# Patient Record
Sex: MALE | Race: WHITE | Employment: OTHER | URBAN - METROPOLITAN AREA
[De-identification: names, ages, dates, MRNs, and addresses within clinical notes are randomized per-mention and may not be internally consistent; named-entity substitution may affect disease eponyms.]

---

## 2017-05-19 DIAGNOSIS — G47.00 PERSISTENT INSOMNIA: ICD-10-CM

## 2017-05-19 RX ORDER — ZOLPIDEM TARTRATE 5 MG/1
TABLET ORAL
Start: 2017-05-19

## 2017-05-25 ENCOUNTER — OFFICE VISIT (OUTPATIENT)
Dept: FAMILY MEDICINE | Facility: CLINIC | Age: 82
End: 2017-05-25
Payer: MEDICARE

## 2017-05-25 VITALS
TEMPERATURE: 97.5 F | SYSTOLIC BLOOD PRESSURE: 133 MMHG | HEIGHT: 70 IN | BODY MASS INDEX: 21.9 KG/M2 | OXYGEN SATURATION: 95 % | DIASTOLIC BLOOD PRESSURE: 82 MMHG | WEIGHT: 153 LBS | HEART RATE: 68 BPM

## 2017-05-25 DIAGNOSIS — Z00.01 ENCOUNTER FOR ROUTINE ADULT HEALTH EXAMINATION WITH ABNORMAL FINDINGS: Primary | ICD-10-CM

## 2017-05-25 DIAGNOSIS — D50.8 OTHER IRON DEFICIENCY ANEMIA: ICD-10-CM

## 2017-05-25 DIAGNOSIS — G47.00 PERSISTENT INSOMNIA: ICD-10-CM

## 2017-05-25 DIAGNOSIS — I10 ESSENTIAL HYPERTENSION WITH GOAL BLOOD PRESSURE LESS THAN 140/90: ICD-10-CM

## 2017-05-25 LAB — HGB BLD-MCNC: 13.1 G/DL (ref 13.3–17.7)

## 2017-05-25 PROCEDURE — 36415 COLL VENOUS BLD VENIPUNCTURE: CPT | Performed by: FAMILY MEDICINE

## 2017-05-25 PROCEDURE — G0439 PPPS, SUBSEQ VISIT: HCPCS | Performed by: FAMILY MEDICINE

## 2017-05-25 PROCEDURE — 85018 HEMOGLOBIN: CPT | Performed by: FAMILY MEDICINE

## 2017-05-25 PROCEDURE — 80053 COMPREHEN METABOLIC PANEL: CPT | Performed by: FAMILY MEDICINE

## 2017-05-25 RX ORDER — ZOLPIDEM TARTRATE 5 MG/1
5 TABLET ORAL
Qty: 90 TABLET | Refills: 1 | Status: SHIPPED | OUTPATIENT
Start: 2017-05-25 | End: 2018-03-23

## 2017-05-25 RX ORDER — LISINOPRIL AND HYDROCHLOROTHIAZIDE 20; 25 MG/1; MG/1
TABLET ORAL
Qty: 90 TABLET | Status: SHIPPED | OUTPATIENT
Start: 2017-05-25 | End: 2018-05-29

## 2017-05-25 NOTE — PROGRESS NOTES
SUBJECTIVE:                                                            Bhavesh Schafer is a 86 year old male who presents for Preventive Visit.      Are you in the first 12 months of your Medicare Part B coverage?  No    Healthy Habits:    Do you get at least three servings of calcium containing foods daily (dairy, green leafy vegetables, etc.)? yes    Amount of exercise or daily activities, outside of work:3 day(s) per week    Problems taking medications regularly No    Medication side effects: No    Have you had an eye exam in the past two years? no    Do you see a dentist twice per year? no    Do you have sleep apnea, excessive snoring or daytime drowsiness?no    COGNITIVE SCREEN  1) Repeat 3 items (Banana, Sunrise, Chair)    2) Clock draw: NORMAL  3) 3 item recall: Recalls 3 objects  Results: 3 items recalled: COGNITIVE IMPAIRMENT LESS LIKELY    Mini-CogTM Copyright S [employer]. Licensed by the author for use in [facility]; reprinted with permission (ayush@[facility]). All rights reserved.            Reviewed and updated as needed this visit by clinical staff  Meds  Problems         Reviewed and updated as needed this visit by Provider  Meds  Problems        Social History   Substance Use Topics     Smoking status: Former Smoker     Quit date: 3/5/1979     Smokeless tobacco: Never Used     Alcohol use No      Comment: rare red wine       The patient does not drink >3 drinks per day nor >7 drinks per week.    Today's PHQ-2 Score:   PHQ-2 ( 1999 [employer]) 5/25/2017 1/15/2014   Q1: Little interest or pleasure in doing things 0 0   Q2: Feeling down, depressed or hopeless 0 0   PHQ-2 Score 0 0       Do you feel safe in your environment - Yes    Do you have a Health Care Directive?: No: Advance care planning was reviewed with patient; patient declined at this time.    Current providers sharing in care for this patient include:   Patient Care Team:  Tariq Zapata MD as PCP - General      [facility]  impairment: No, m,ay not hear as sharp as before bu tok    Ability to successfully perform activities of daily living: Yes, no assistance needed     Fall risk:  Fallen 2 or more times in the past year?: No  Any fall with injury in the past year?: No    Home safety:  none identified      The following health maintenance items are reviewed in Epic and correct as of today:  Health Maintenance   Topic Date Due     MEDICARE ANNUAL WELLNESS VISIT  04/18/1949     DEPRESSION ACTION PLAN Q1 YR  02/20/2015     PHQ-9 Q6 MONTHS  11/24/2016     FALL RISK ASSESSMENT  05/24/2017     INFLUENZA VACCINE (SYSTEM ASSIGNED)  09/01/2017     ADVANCE DIRECTIVE PLANNING Q5 YRS  02/05/2018     TETANUS IMMUNIZATION (SYSTEM ASSIGNED)  12/17/2019     PNEUMOCOCCAL  Completed              ROS:he is doing well, continues to paint, cooking his own food and living independently. He has a large apartment where he can live and paint.We talked about the Ambien again. We have tried multiple times to find an alternative  Way for him to sleep well but if he doesn't have it even for long periods of time he never gets used to sleeping on his own, is really miserable during the day,, and he has safely used Ambien for years now. I told him I'm fine with filling another 6 months worth. In 6 months if the doctor taking over her knee is uncomfortable with that they ask him to schedule an appointment to discuss it. We also talked about trying valerian root and or melatonin again. We checked his hemoglobin again today, just slightly low. It was never clear why he became so iron deficient some years ago at 13.1 I think is fine.  C: NEGATIVE for fever, chills, change in weight  I: NEGATIVE for worrisome rashes, moles or lesions  E: NEGATIVE for vision changes or irritation  E/M: NEGATIVE for ear, mouth and throat problems  R: NEGATIVE for significant cough or SOB  B: NEGATIVE for masses, tenderness or discharge  CV: NEGATIVE for chest pain, palpitations or  "peripheral edema  GI: NEGATIVE for nausea, abdominal pain, heartburn, or change in bowel habits  : NEGATIVE for frequency, dysuria, or hematuria  M: NEGATIVE for significant arthralgias or myalgia  N: NEGATIVE for weakness, dizziness or paresthesias  E: NEGATIVE for temperature intolerance, skin/hair changes  H: NEGATIVE for bleeding problems  P: NEGATIVE for changes in mood or affect    Problem list, Medication list, Allergies, and Medical/Social/Surgical histories reviewed in [facility] and updated as appropriate.  OBJECTIVE:                                                            There were no vitals taken for this visit. Estimated body mass index is 22.53 kg/(m^2) as calculated from the following:    Height as of 5/24/16: 5' 10\" (1.778 m).    Weight as of 5/24/16: 157 lb (71.2 kg).  EXAM:   GENERAL: healthy, alert and no distress  EYES: Eyes grossly normal to inspection, PERRL and conjunctivae and sclerae normal  HENT: ear canals and TM's normal, nose and mouth without ulcers or lesions  NECK: no adenopathy, no asymmetry, masses, or scars and thyroid normal to palpation  RESP: lungs clear to auscultation - no rales, rhonchi or wheezes  CV: regular rate and rhythm, normal S1 S2, no S3 or S4, no murmur, click or rub, no peripheral edema and peripheral pulses strong  ABDOMEN: soft, nontender, no hepatosplenomegaly, no masses and bowel sounds normal   (male): normal male genitalia without lesions or urethral discharge, no hernia  RECTAL: normal sphincter tone, no rectal masses, prostate normal size, smooth, nontender without nodules or masses  MS: no gross musculoskeletal defects noted, no edema  SKIN: no suspicious lesions or rashes  NEURO: Normal strength and tone, mentation intact and speech normal  PSYCH: mentation appears normal, affect normal/bright    ASSESSMENT / PLAN:                                                            1. Encounter for routine adult health examination with abnormal findings    - " "Comprehensive metabolic panel (BMP + Alb, Alk Phos, ALT, AST, Total. Bili, TP)    2. Persistent insomnia    - zolpidem (AMBIEN) 5 MG tablet; Take 1 tablet (5 mg) by mouth nightly as needed for sleep  Dispense: 90 tablet; Refill: 1    3. Essential hypertension with goal blood pressure less than 140/90    - lisinopril-hydrochlorothiazide (PRINZIDE/ZESTORETIC) 20-25 MG per tablet; TAKE 1 TABLET BY ORAL ROUTE ONCE DAILY  Dispense: 90 tablet; Refill: prn    4. Other iron deficiency anemia    - Hemoglobin    End of Life Planning:  Patient currently has an advanced directive: No.  I have verified the patient's ablity to prepare an advanced directive/make health care decisions.  Literature was provided to assist patient in preparing an advanced directive.    COUNSELING:  Reviewed preventive health counseling, as reflected in patient instructions       Regular exercise       Healthy diet/nutrition       Vision screening        Estimated body mass index is 22.53 kg/(m^2) as calculated from the following:    Height as of 5/24/16: 5' 10\" (1.778 m).    Weight as of 5/24/16: 157 lb (71.2 kg).     reports that he quit smoking about 38 years ago. He has never used smokeless tobacco.      Appropriate preventive services were discussed with this patient, including applicable screening as appropriate for cardiovascular disease, diabetes, osteopenia/osteoporosis, and glaucoma.  As appropriate for age/gender, discussed screening for colorectal cancer, prostate cancer, breast cancer, and cervical cancer. Checklist reviewing preventive services available has been given to the patient.    Reviewed patients plan of care and provided an AVS. The Basic Care Plan (routine screening as documented in Health Maintenance) for Bhavesh meets the Care Plan requirement. This Care Plan has been established and reviewed with the Patient.    Counseling Resources:  ATP IV Guidelines  Pooled Cohorts Equation Calculator  Breast Cancer Risk Calculator  FRAX " Risk Assessment  ICSI Preventive Guidelines  Dietary Guidelines for Americans, 2010  USDA's MyPlate  ASA Prophylaxis  Lung CA Screening    Tariq Zapata MD  [facility]

## 2017-05-25 NOTE — MR AVS SNAPSHOT
After Visit Summary   5/25/2017    Bhavesh Schafer    MRN: [id number]           Patient Information     Date Of Birth          4/18/1931        Visit Information        Provider Department      5/25/2017 11:30 AM Tariq Zapata MD [facility]        Today's Diagnoses     Encounter for routine adult health examination with abnormal findings    -  1    Persistent insomnia        Essential hypertension with goal blood pressure less than 140/90        Other iron deficiency anemia          Care Instructions      Preventive Health Recommendations:       Male Ages 65 and over    Yearly exam:             See your health care provider every year in order to  o   Review health changes.   o   Discuss preventive care.    o   Review your medicines if your doctor has prescribed any.    Talk with your health care provider about whether you should have a test to screen for prostate cancer (PSA).    Every 3 years, have a diabetes test (fasting glucose). If you are at risk for diabetes, you should have this test more often.    Every 5 years, have a cholesterol test. Have this test more often if you are at risk for high cholesterol or heart disease.     Every 10 years, have a colonoscopy. Or, have a yearly FIT test (stool test). These exams will check for colon cancer.    Talk to with your health care provider about screening for Abdominal Aortic Aneurysm if you have a family history of AAA or have a history of smoking.  Shots:     Get a flu shot each year.     Get a tetanus shot every 10 years.     Talk to your doctor about your pneumonia vaccines. There are now two you should receive - Pneumovax (PPSV 23) and Prevnar (PCV 13).    Talk to your doctor about a shingles vaccine.     Talk to your doctor about the hepatitis B vaccine.  Nutrition:     Eat at least 5 servings of fruits and vegetables each day.     Eat whole-grain bread, whole-wheat pasta and brown rice instead of white grains and rice.     Talk  "to your doctor about Calcium and Vitamin D.   Lifestyle    Exercise for at least 150 minutes a week (30 minutes a day, 5 days a week). This will help you control your weight and prevent disease.     Limit alcohol to one drink per day.     No smoking.     Wear sunscreen to prevent skin cancer.     See your dentist every six months for an exam and cleaning.     See your eye doctor every 1 to 2 years to screen for conditions such as glaucoma, macular degeneration and cataracts.          Follow-ups after your visit        Who to contact     If you have questions or need follow up information about today's clinic visit or your schedule please contact [facility] directly at [phone].  Normal or non-critical lab and imaging results will be communicated to you by MyChart, letter or phone within 4 business days after the clinic has received the results. If you do not hear from us within 7 days, please contact the clinic through "[employer]hart or phone. If you have a critical or abnormal lab result, we will notify you by phone as soon as possible.  Submit refill requests through [employer] or call your pharmacy and they will forward the refill request to us. Please allow 3 business days for your refill to be completed.          Additional Information About Your Visit        MyChart Information     [employer] lets you send messages to your doctor, view your test results, renew your prescriptions, schedule appointments and more. To sign up, go to [email or web address] . Click on \"Log in\" on the left side of the screen, which will take you to the Welcome page. Then click on \"Sign up Now\" on the right side of the page.     You will be asked to enter the access code listed below, as well as some personal information. Please follow the directions to create your username and password.     Your access code is: [email or web address]  Expires: 2017 12:04 PM     Your access code will  in 90 days. If you need help or a new code, " "please call your [place] clinic or [phone].        Care EveryWhere ID     This is your Care EveryWhere ID. This could be used by other organizations to access your [place] medical records  [id number]        Your Vitals Were     Pulse Temperature Height Pulse Oximetry BMI (Body Mass Index)       68 97.5  F (36.4  C) (Oral) 5' 10\" (1.778 m) 95% 21.95 kg/m2        Blood Pressure from Last 3 Encounters:   05/25/17 133/82   05/24/16 120/80   04/07/15 100/70    Weight from Last 3 Encounters:   05/25/17 153 lb (69.4 kg)   05/24/16 157 lb (71.2 kg)   04/07/15 159 lb (72.1 kg)              We Performed the Following     Comprehensive metabolic panel (BMP + Alb, Alk Phos, ALT, AST, Total. Bili, TP)     Hemoglobin          Where to get your medicines      These medications were sent to [id number] [address] [address]     Phone:  [phone]     lisinopril-hydrochlorothiazide 20-25 MG per tablet         Some of these will need a paper prescription and others can be bought over the counter.  Ask your nurse if you have questions.     Bring a paper prescription for each of these medications     zolpidem 5 MG tablet          Primary Care Provider Office Phone # Fax #    Tariq Zapata [phone]       [facility] [address] [address]        Thank you!     Thank you for choosing [facility]  for your care. Our goal is always to provide you with excellent care. Hearing back from our patients is one way we can continue to improve our services. Please take a few minutes to complete the written survey that you may receive in the mail after your visit with us. Thank you!             Your Updated Medication List - Protect others around you: Learn how to safely use, store and throw away your medicines at www.disposemymeds.org.          This list is accurate as of: 5/25/17 12:04 PM.  Always use " your most recent med list.                   Brand Name Dispense Instructions for use    [facility] SAW PALMETTO 160 MG Caps   Generic drug:  saw palmetto      Take 3 capsules (480 mg) by mouth daily       lisinopril-hydrochlorothiazide 20-25 MG per tablet    PRINZIDE/ZESTORETIC    90 tablet    TAKE 1 TABLET BY ORAL ROUTE ONCE DAILY       magnesium 250 MG tablet     100 tablet    Take 1 tablet by mouth daily.       VITAMIN C 2000 MG Tabs     30    1 TABLET DAILY       zolpidem 5 MG tablet    AMBIEN    90 tablet    Take 1 tablet (5 mg) by mouth nightly as needed for sleep

## 2017-05-25 NOTE — NURSING NOTE
"Chief Complaint   Patient presents with     Medicare Visit     not fasting     /82  Pulse 68  Temp 97.5  F (36.4  C) (Oral)  Ht 5' 10\" (1.778 m)  Wt 153 lb (69.4 kg)  SpO2 95%  BMI 21.95 kg/m2 Estimated body mass index is 21.95 kg/(m^2) as calculated from the following:    Height as of this encounter: 5' 10\" (1.778 m).    Weight as of this encounter: 153 lb (69.4 kg).  BP completed using cuff size: regular       Health Maintenance due pending provider review:  NONE    n/a      Pako Coy CMA  "

## 2017-05-26 LAB
ALBUMIN SERPL-MCNC: 4 G/DL (ref 3.4–5)
ALP SERPL-CCNC: 70 U/L (ref 40–150)
ALT SERPL W P-5'-P-CCNC: 16 U/L (ref 0–70)
ANION GAP SERPL CALCULATED.3IONS-SCNC: 6 MMOL/L (ref 3–14)
AST SERPL W P-5'-P-CCNC: 17 U/L (ref 0–45)
BILIRUB SERPL-MCNC: 0.7 MG/DL (ref 0.2–1.3)
BUN SERPL-MCNC: 29 MG/DL (ref 7–30)
CALCIUM SERPL-MCNC: 9.1 MG/DL (ref 8.5–10.1)
CHLORIDE SERPL-SCNC: 102 MMOL/L (ref 94–109)
CO2 SERPL-SCNC: 28 MMOL/L (ref 20–32)
CREAT SERPL-MCNC: 0.96 MG/DL (ref 0.66–1.25)
GFR SERPL CREATININE-BSD FRML MDRD: 74 ML/MIN/1.7M2
GLUCOSE SERPL-MCNC: 94 MG/DL (ref 70–99)
POTASSIUM SERPL-SCNC: 4.1 MMOL/L (ref 3.4–5.3)
PROT SERPL-MCNC: 7.7 G/DL (ref 6.8–8.8)
SODIUM SERPL-SCNC: 136 MMOL/L (ref 133–144)

## 2017-05-26 NOTE — PROGRESS NOTES
Disc / pt. Stopped iron long ago as it was constipating. I rec he but a bottle of iron pills and take one a week indefinitely

## 2018-01-29 ENCOUNTER — OFFICE VISIT (OUTPATIENT)
Dept: FAMILY MEDICINE | Facility: CLINIC | Age: 83
End: 2018-01-29
Payer: MEDICARE

## 2018-01-29 VITALS
SYSTOLIC BLOOD PRESSURE: 122 MMHG | WEIGHT: 150 LBS | HEART RATE: 78 BPM | DIASTOLIC BLOOD PRESSURE: 76 MMHG | HEIGHT: 70 IN | OXYGEN SATURATION: 98 % | BODY MASS INDEX: 21.47 KG/M2 | TEMPERATURE: 97.3 F

## 2018-01-29 DIAGNOSIS — I10 HYPERTENSION GOAL BP (BLOOD PRESSURE) < 140/90: ICD-10-CM

## 2018-01-29 DIAGNOSIS — R53.83 OTHER FATIGUE: Primary | ICD-10-CM

## 2018-01-29 DIAGNOSIS — G47.00 PERSISTENT INSOMNIA: ICD-10-CM

## 2018-01-29 DIAGNOSIS — S42.295A OTHER CLOSED NONDISPLACED FRACTURE OF PROXIMAL END OF LEFT HUMERUS, INITIAL ENCOUNTER: ICD-10-CM

## 2018-01-29 LAB
ALBUMIN SERPL-MCNC: 3.8 G/DL (ref 3.4–5)
ALBUMIN UR-MCNC: NEGATIVE MG/DL
ALP SERPL-CCNC: 110 U/L (ref 40–150)
ALT SERPL W P-5'-P-CCNC: 13 U/L (ref 0–70)
ANION GAP SERPL CALCULATED.3IONS-SCNC: 8 MMOL/L (ref 3–14)
APPEARANCE UR: CLEAR
AST SERPL W P-5'-P-CCNC: 18 U/L (ref 0–45)
BASOPHILS # BLD AUTO: 0 10E9/L (ref 0–0.2)
BASOPHILS NFR BLD AUTO: 0.3 %
BILIRUB SERPL-MCNC: 0.5 MG/DL (ref 0.2–1.3)
BILIRUB UR QL STRIP: NEGATIVE
BUN SERPL-MCNC: 30 MG/DL (ref 7–30)
CALCIUM SERPL-MCNC: 9.3 MG/DL (ref 8.5–10.1)
CHLORIDE SERPL-SCNC: 98 MMOL/L (ref 94–109)
CO2 SERPL-SCNC: 28 MMOL/L (ref 20–32)
COLOR UR AUTO: YELLOW
CREAT SERPL-MCNC: 1.12 MG/DL (ref 0.66–1.25)
DIFFERENTIAL METHOD BLD: ABNORMAL
EOSINOPHIL # BLD AUTO: 0.1 10E9/L (ref 0–0.7)
EOSINOPHIL NFR BLD AUTO: 1.1 %
ERYTHROCYTE [DISTWIDTH] IN BLOOD BY AUTOMATED COUNT: 13.4 % (ref 10–15)
GFR SERPL CREATININE-BSD FRML MDRD: 62 ML/MIN/1.7M2
GLUCOSE SERPL-MCNC: 90 MG/DL (ref 70–99)
GLUCOSE UR STRIP-MCNC: NEGATIVE MG/DL
HCT VFR BLD AUTO: 39.9 % (ref 40–53)
HGB BLD-MCNC: 13.1 G/DL (ref 13.3–17.7)
HGB UR QL STRIP: NEGATIVE
KETONES UR STRIP-MCNC: NEGATIVE MG/DL
LEUKOCYTE ESTERASE UR QL STRIP: NEGATIVE
LYMPHOCYTES # BLD AUTO: 1.2 10E9/L (ref 0.8–5.3)
LYMPHOCYTES NFR BLD AUTO: 19.1 %
MCH RBC QN AUTO: 30.7 PG (ref 26.5–33)
MCHC RBC AUTO-ENTMCNC: 32.8 G/DL (ref 31.5–36.5)
MCV RBC AUTO: 93 FL (ref 78–100)
MONOCYTES # BLD AUTO: 0.8 10E9/L (ref 0–1.3)
MONOCYTES NFR BLD AUTO: 12.9 %
MUCOUS THREADS #/AREA URNS LPF: PRESENT /LPF
NEUTROPHILS # BLD AUTO: 4.1 10E9/L (ref 1.6–8.3)
NEUTROPHILS NFR BLD AUTO: 66.6 %
NITRATE UR QL: NEGATIVE
PH UR STRIP: 5.5 PH (ref 5–7)
PLATELET # BLD AUTO: 261 10E9/L (ref 150–450)
POTASSIUM SERPL-SCNC: 4.3 MMOL/L (ref 3.4–5.3)
PROT SERPL-MCNC: 7.4 G/DL (ref 6.8–8.8)
RBC # BLD AUTO: 4.27 10E12/L (ref 4.4–5.9)
RBC #/AREA URNS AUTO: ABNORMAL /HPF
SODIUM SERPL-SCNC: 134 MMOL/L (ref 133–144)
SOURCE: ABNORMAL
SP GR UR STRIP: 1.02 (ref 1–1.03)
UROBILINOGEN UR STRIP-ACNC: 0.2 EU/DL (ref 0.2–1)
WBC # BLD AUTO: 6.1 10E9/L (ref 4–11)
WBC #/AREA URNS AUTO: ABNORMAL /HPF

## 2018-01-29 PROCEDURE — 81001 URINALYSIS AUTO W/SCOPE: CPT | Performed by: FAMILY MEDICINE

## 2018-01-29 PROCEDURE — 99214 OFFICE O/P EST MOD 30 MIN: CPT | Performed by: FAMILY MEDICINE

## 2018-01-29 PROCEDURE — 85025 COMPLETE CBC W/AUTO DIFF WBC: CPT | Performed by: FAMILY MEDICINE

## 2018-01-29 PROCEDURE — 80053 COMPREHEN METABOLIC PANEL: CPT | Performed by: FAMILY MEDICINE

## 2018-01-29 PROCEDURE — 36415 COLL VENOUS BLD VENIPUNCTURE: CPT | Performed by: FAMILY MEDICINE

## 2018-01-29 NOTE — NURSING NOTE
"Chief Complaint   Patient presents with     Fatigue     Hypertension       Initial /76  Pulse 78  Temp 97.3  F (36.3  C) (Oral)  Ht 5' 10\" (1.778 m)  Wt 150 lb (68 kg)  SpO2 98%  BMI 21.52 kg/m2 Estimated body mass index is 21.52 kg/(m^2) as calculated from the following:    Height as of this encounter: 5' 10\" (1.778 m).    Weight as of this encounter: 150 lb (68 kg).  Medication Reconciliation: complete      Health Maintenance that is potentially due pending provider review:  PHQ9    Completing PHQ9 today.    NORM Gooden  "

## 2018-01-29 NOTE — MR AVS SNAPSHOT
"              After Visit Summary   2018    Bhavesh Schafer    MRN: [id number]           Patient Information     Date Of Birth          1931        Visit Information        Provider Department      2018 11:00 AM Eugene Maki MD [facility]        Today's Diagnoses     Other fatigue    -  1    Hypertension goal BP (blood pressure) < 140/90        Other closed nondisplaced fracture of proximal end of left humerus, initial encounter        Persistent insomnia           Follow-ups after your visit        Who to contact     If you have questions or need follow up information about today's clinic visit or your schedule please contact [facility] directly at [phone].  Normal or non-critical lab and imaging results will be communicated to you by MyChart, letter or phone within 4 business days after the clinic has received the results. If you do not hear from us within 7 days, please contact the clinic through MyChart or phone. If you have a critical or abnormal lab result, we will notify you by phone as soon as possible.  Submit refill requests through [employer] or call your pharmacy and they will forward the refill request to us. Please allow 3 business days for your refill to be completed.          Additional Information About Your Visit        MyChart Information     [employer] lets you send messages to your doctor, view your test results, renew your prescriptions, schedule appointments and more. To sign up, go to [email or web address] . Click on \"Log in\" on the left side of the screen, which will take you to the Welcome page. Then click on \"Sign up Now\" on the right side of the page.     You will be asked to enter the access code listed below, as well as some personal information. Please follow the directions to create your username and password.     Your access code is: [email or web address]  Expires: 2018 11:17 AM     Your access code will  in 90 days. If you need " Visit Information Date & Time Provider Department Dept. Phone Encounter #  
 12/4/2017  2:00 PM Christine Kelley MD [facility] 34 [id number] Follow-up Instructions Return in about 6 weeks (around 1/15/2018) for blood pressure, palpitations, anxiety. Upcoming Health Maintenance Date Due DTaP/Tdap/Td series (1 - Tdap) 4/18/1983 FOBT Q 1 YEAR AGE 50-75 10/2/2017 PAP AKA CERVICAL CYTOLOGY 7/15/2018 BREAST CANCER SCRN MAMMOGRAM 8/16/2019 Allergies as of 12/4/2017  Review Complete On: 12/4/2017 By: Christine Kelley MD  
  
 Severity Noted Reaction Type Reactions Percocet [Oxycodone-acetaminophen]  04/08/2014    Other (comments) Slows heart rate. Current Immunizations  Reviewed on 10/27/2016 No immunizations on file. Not reviewed this visit You Were Diagnosed With   
  
 Codes Comments Encounter for immunization    -  Primary ICD-10-CM: C32 ICD-9-CM: V03.89 Encounter for FIT (fecal immunochemical test) screening     ICD-10-CM: Z12.11 ICD-9-CM: V76.51 Chest pain at rest     ICD-10-CM: R07.9 ICD-9-CM: 786.50 Abnormal EKG     ICD-10-CM: R94.31 
ICD-9-CM: 794.31 Palpitations     ICD-10-CM: R00.2 ICD-9-CM: 785.1 Anxiety     ICD-10-CM: F41.9 ICD-9-CM: 300.00 Vitals BP Pulse Temp Resp Height(growth percentile) Weight(growth percentile) 137/87 78 97.8 °F (36.6 °C) (Oral) 18 5' 5\" (1.651 m) 205 lb (93 kg) BMI OB Status Smoking Status 34.11 kg/m2 Hysterectomy Never Smoker Vitals History BMI and BSA Data Body Mass Index Body Surface Area  
 34.11 kg/m 2 2.07 m 2 Preferred Pharmacy Pharmacy Name Phone [id number] [address] [phone] Your Updated Medication List  
  
   
This list is accurate as of: 12/4/17  3:01 PM.  Always use your most recent med list.  
  
  
  
  
 cetirizine 10 mg tablet "help or a new code, please call your [place] clinic or [phone].        Care EveryWhere ID     This is your Care EveryWhere ID. This could be used by other organizations to access your [place] medical records  [id number]        Your Vitals Were     Pulse Temperature Height Pulse Oximetry BMI (Body Mass Index)       78 97.3  F (36.3  C) (Oral) 5' 10\" (1.778 m) 98% 21.52 kg/m2        Blood Pressure from Last 3 Encounters:   01/29/18 122/76   05/25/17 133/82   05/24/16 120/80    Weight from Last 3 Encounters:   01/29/18 150 lb (68 kg)   05/25/17 153 lb (69.4 kg)   05/24/16 157 lb (71.2 kg)              We Performed the Following     CBC with platelets differential     Comprehensive metabolic panel     UA with Microscopic reflex to Culture        Primary Care Provider Office Phone # Fax #    Eugene Ron Maki [phone] [address] [facility] [address]        Equal Access to Services     [facility]: Hadii aad ku hadasho Soomaali, waaxda luqadaha, qaybta kaalmada adeegyadusty, lynda potts . So [facility] [phone].    ATENCIÓN: Si habla español, tiene a torres disposición servicios gratuitos de asistencia lingüística. Llame al [phone].    We comply with applicable federal civil rights laws and [place] laws. We do not discriminate on the basis of race, color, national origin, age, disability, sex, sexual orientation, or gender identity.            Thank you!     Thank you for choosing [facility]  for your care. Our goal is always to provide you with excellent care. Hearing back from our patients is one way we can continue to improve our services. Please take a few minutes to complete the written survey that you may receive in the mail after your visit with us. Thank you!             Your Updated Medication List - Protect others around you: Learn how to safely use, store and throw away your medicines at www.disposemymeds.org.        " Commonly known as:  ZYRTEC Take 1 Tab by mouth daily. For allergy Cholecalciferol (Vitamin D3) 2,000 unit Cap capsule Commonly known as:  VITAMIN D3 Take  by mouth daily. Diphth, Pertus(Acell), Tetanus 2.5-8-5 Lf-mcg-Lf/0.5mL Susp susp Commonly known as:  BOOSTRIX TDAP  
0.5 mL by IntraMUSCular route once for 1 dose. folic acid 1 mg tablet Commonly known as:  FOLVITE  
TAKE 1 TABLET BY MOUTH ONCE DAILY  
  
 methotrexate 2.5 mg tablet Commonly known as:  Forestine Candy * mometasone 50 mcg/actuation nasal spray Commonly known as:  NASONEX  
1 [place] by Both Nostrils route daily. * mometasone 50 mcg/actuation nasal spray Commonly known as:  NASONEX  
2 Sprays by Both Nostrils route daily. For allergy symptoms PATADAY 0.2 % Drop ophthalmic solution Generic drug:  olopatadine Apply 1 Drop to eye daily. predniSONE 5 mg tablet Commonly known as:  Barb Marie Take 5 mg by mouth daily. raNITIdine 300 mg tablet Commonly known as:  ZANTAC Take 1 Tab by mouth daily. VITAMIN C 500 mg tablet Generic drug:  ascorbic acid (vitamin C) Take  by mouth daily. * Notice: This list has 2 medication(s) that are the same as other medications prescribed for you. Read the directions carefully, and ask your doctor or other care provider to review them with you. Prescriptions Sent to Pharmacy Refills [place],, Tetanus (BOOSTRIX TDAP) 2.5-8-5 Lf-mcg-Lf/0.5mL susp susp 0 Si.5 mL by IntraMUSCular route once for 1 dose. Class: Normal  
 Pharmacy: [facility]/pharmacy #[id number] [place] [address], [address] [place] [address] Ph #: [phone] Route: IntraMUSCular We Performed the Following AMB POC EKG ROUTINE W/ 12 LEADS, INTER & REP [64923 CPT(R)] OCCULT BLOOD, IMMUNOASSAY (FIT) B2721986 CPT(R)] REFERRAL TO CARDIOLOGY [KJB92 Custom] Comments:  
 Chest pain, LVH on EKG, palpitations   This list is accurate as of 1/29/18 11:17 AM.  Always use your most recent med list.                   Brand Name Dispense Instructions for use Diagnosis    [facility] SAW PALMETTO 160 MG Caps   Generic drug:  saw palmetto      Take 3 capsules (480 mg) by mouth daily        lisinopril-hydrochlorothiazide 20-25 MG per tablet    PRINZIDE/ZESTORETIC    90 tablet    TAKE 1 TABLET BY ORAL ROUTE ONCE DAILY    Essential hypertension with goal blood pressure less than 140/90       magnesium 250 MG tablet     100 tablet    Take 1 tablet by mouth daily.        VITAMIN C 2000 MG Tabs     30    1 TABLET DAILY    Routine general medical examination at a health care facility       zolpidem 5 MG tablet    AMBIEN    90 tablet    Take 1 tablet (5 mg) by mouth nightly as needed for sleep    Persistent insomnia          TSH 3RD GENERATION [18298 CPT(R)] Follow-up Instructions Return in about 6 weeks (around 1/15/2018) for blood pressure, palpitations, anxiety. Referral Information Referral ID Referred By Referred To  
  
 [id number] Juan Francisco Yan MD   
   [address] [place] [address], [address] [place] [address] Phone: [phone] Fax: [phone] Visits Status Start Date End Date 1 New Request 12/4/17 12/4/18 If your referral has a status of pending review or denied, additional information will be sent to support the outcome of this decision. Introducing [facility]! Dear [facility]: 
Thank you for requesting a [employer] account. Our records indicate that you already have an active [employer] account. You can access your account anytime at [email or web address]. [email or web address] Did you know that you can access your hospital and ER discharge instructions at any time in [employer]? You can also review all of your test results from your hospital stay or ER visit. Additional Information If you have questions, please visit the Frequently Asked Questions section of the [employer] website at [email or web address] Remember, [employer] is NOT to be used for urgent needs. For medical emergencies, dial 911. Now available from your iPhone and Android! Please provide this summary of care documentation to your next provider. Your primary care clinician is listed as Jesus Batista. If you have any questions after today's visit, please call [phone].

## 2018-01-29 NOTE — PROGRESS NOTES
SUBJECTIVE:   Bhavesh Schafer is a 86 year old male who presents to clinic today for the following health issues:      Fatigue       Duration: x4 days     Description (location/character/radiation): n/a    Intensity:  moderate    Accompanying signs and symptoms: no other sx     History (similar episodes/previous evaluation): None    Precipitating or alleviating factors: None    Therapies tried and outcome: None     Friend also was ill, but he has not had fever or headache  Though his friend did  Urinates more frequently last few weeks      Hypertension Follow-up      Outpatient blood pressures are not being checked.    Low Salt Diet: low salt    also  Jan 2nd hurt left arm/shoulder  Had xrays, Obtained outside records it looks like he had  A fracture of the humerus    Seeing orthopedics on Feb 20th      Also would like to discuss use of ambien, wonders if it is ok for him to take 10mg sometimes    ROS: As per HPI.  Constitutional:  no fevers/sweats/chills  Ears/Nose/Throat: No runny nose, sore throat or ear pain  CV: no palpitations, no chest pain, no lower extremity swelling.  Resp: no shortness of breath, wheezing, or cough.  GI: no nausea/vomiting/diarrhea, no black or bloody stools      I have reviewed and updated the patient's past medical history in the EMR. Current problems are:    Patient Active Problem List   Diagnosis     Macular degeneration (senile) of retina     Retinal neovascularization     Persistent insomnia     HYPERLIPIDEMIA LDL GOAL <130     Hypertension goal BP (blood pressure) < 140/90     Iron deficiency anemia     Advanced directives, counseling/discussion     Right inguinal hernia     Acute urinary retention     History of depression     Other closed nondisplaced fracture of proximal end of left humerus, initial encounter     Past Surgical History:   Procedure Laterality Date     HC REMOVE TONSILS/ADENOIDS,<13 Y/O  age 8     HC REPAIR ING HERNIA 5+ YO,BALTAZAR  8/04    L, recurrent with small  "bowel incarceration     HC REPAIR ING HERNIA,5+Y/O,REDUCIBL      L     HC TOOTH EXTRACTION W/FORCEP       LAPAROSCOPIC HERNIORRHAPHY INGUINAL  7/12/2013    Procedure: LAPAROSCOPIC HERNIORRHAPHY INGUINAL;  Laparoscopic Right Inguinal Hernia Repair with Mesh ;  Surgeon: Martin Quinteros MD;  [facility]     LASER SURGERY OF EYE  8/04    L retina laser therapy for neovascular membrane       Social History   Substance Use Topics     Smoking status: Former Smoker     Quit date: 3/5/1979     Smokeless tobacco: Never Used     Alcohol use No      Comment: rare red wine     Family History   Problem Relation Age of Onset     CANCER Mother      colon     CEREBROVASCULAR DISEASE Father      ruptured anuerysm     Hypertension Father          Allergies, reviewed:     Allergies   Allergen Reactions     Atenolol Swelling     Fast pulse. Patient does not remember getting this medication.     No Known Allergies        Current Outpatient Prescriptions   Medication Sig Dispense Refill     zolpidem (AMBIEN) 5 MG tablet Take 1 tablet (5 mg) by mouth nightly as needed for sleep 90 tablet 1     lisinopril-hydrochlorothiazide (PRINZIDE/ZESTORETIC) 20-25 MG per tablet TAKE 1 TABLET BY ORAL ROUTE ONCE DAILY 90 tablet prn     saw palmetto ([facility] SAW PALMETTO) 160 MG CAPS Take 3 capsules (480 mg) by mouth daily       Magnesium 250 MG tablet Take 1 tablet by mouth daily. 100 tablet 3     VITAMIN C 2000 MG OR TABS 1 TABLET DAILY 30 0       Objective:  /76  Pulse 78  Temp 97.3  F (36.3  C) (Oral)  Ht 5' 10\" (1.778 m)  Wt 150 lb (68 kg)  SpO2 98%  BMI 21.52 kg/m2  General Appearance: Pleasant, alert, WN/WD in no acute respiratory distress.  Chest/Respiratory Exam: Normal, comfortable, easy respirations. Chest wall normal. Lungs are clear to auscultation. No wheezing, crackles, or rhonchi.  Cardiovascular Exam: Regular rate and rhythm. No murmur, gallop, or rubs. No pedal edema.  Gastrointestinal Exam: Soft, non-tender, no masses " or organomegaly.  Musculoskeletal Exam: Back is non-tender, full ROM of upper and lower extremities.  Skin: no rash, warm and dry.  Neurologic Exam: Nonfocal, no tremor. Normal gait.  Psychiatric Exam: Alert - appropriate, normal affect    ASSESSMENT/PLAN:    ICD-10-CM    1. Other fatigue R53.83    2. Hypertension goal BP (blood pressure) < 140/90 I10    3. Other closed nondisplaced fracture of proximal end of left humerus, initial encounter S42.295A    4. Persistent insomnia G47.00       no specific findings to suggest a clear cause.  Patient is reassured that fatigue is common and does not always represent an active disease process. It may be related to medication use, a mild viral infection or an as-yet unknown medical problem that may evolve over time. I have suggested observation for worsening or other new symptoms such as pain, fever or weight loss and running a few tests, as ordered.   I will call him to follow-up on these tests    Also I do not recommend that he take more than 5 mg of Ambien  I discussed with him that Ambien is relatively contraindicated in people over the age of 70  Certainly not more than 5 mg  He had been getting a pretty much constant supply of this from his previous primary care provider and we will continue that for now      Eugene Maki MD MPH

## 2018-01-30 ASSESSMENT — PATIENT HEALTH QUESTIONNAIRE - PHQ9: SUM OF ALL RESPONSES TO PHQ QUESTIONS 1-9: 8

## 2018-02-26 DIAGNOSIS — G47.00 PERSISTENT INSOMNIA: ICD-10-CM

## 2018-02-26 NOTE — TELEPHONE ENCOUNTER
Zolpidem      Last Written Prescription Date:  5-25-17  Last Fill Quantity: 90,   # refills: 1  Last Office Visit: 1-29-18  Future Office visit:       Routing refill request to provider for review/approval because:  Drug not on the [facility], [facility] or [facility] refill protocol or controlled substance

## 2018-02-27 DIAGNOSIS — G47.00 PERSISTENT INSOMNIA: ICD-10-CM

## 2018-02-27 RX ORDER — ZOLPIDEM TARTRATE 5 MG/1
5 TABLET ORAL
Qty: 90 TABLET | Refills: 0 | Status: CANCELLED | OUTPATIENT
Start: 2018-02-27

## 2018-02-27 RX ORDER — ZOLPIDEM TARTRATE 5 MG/1
TABLET ORAL
Start: 2018-02-27

## 2018-02-27 RX ORDER — ZOLPIDEM TARTRATE 5 MG/1
5 TABLET ORAL
Start: 2018-02-27

## 2018-02-27 NOTE — TELEPHONE ENCOUNTER
LUIS ANTONIO  Controlled Substance Refill Request for Ambien 5 mg    Last refill: 8/25/2017 - #90 [place]    Last clinic visit: 1/29/2018     Clinic visit frequency required: Q 6  months  Next appt: No future OV scheduled    Controlled substance agreement on file: No.    Documentation in problem list reviewed:  Yes    Processing:  [facility]    [place] reviewed:  reviewed- no concerns  [place]:  [email or web address]  Thanks, Bernarda Taylor RN

## 2018-02-27 NOTE — TELEPHONE ENCOUNTER
Patient has an appointment with LUIS ANTONIO on Friday 3/2  Refill will be addressed then.  Bernarda Taylor RN

## 2018-03-02 ENCOUNTER — OFFICE VISIT (OUTPATIENT)
Dept: FAMILY MEDICINE | Facility: CLINIC | Age: 83
End: 2018-03-02
Payer: MEDICARE

## 2018-03-02 VITALS
HEIGHT: 70 IN | OXYGEN SATURATION: 99 % | BODY MASS INDEX: 20.23 KG/M2 | TEMPERATURE: 98.2 F | HEART RATE: 78 BPM | RESPIRATION RATE: 15 BRPM | DIASTOLIC BLOOD PRESSURE: 74 MMHG | SYSTOLIC BLOOD PRESSURE: 124 MMHG | WEIGHT: 141.3 LBS

## 2018-03-02 DIAGNOSIS — J20.9 ACUTE BRONCHITIS WITH SYMPTOMS > 10 DAYS: Primary | ICD-10-CM

## 2018-03-02 PROCEDURE — 99214 OFFICE O/P EST MOD 30 MIN: CPT | Performed by: FAMILY MEDICINE

## 2018-03-02 RX ORDER — AZITHROMYCIN 250 MG/1
TABLET, FILM COATED ORAL
Qty: 6 TABLET | Refills: 0 | Status: SHIPPED | OUTPATIENT
Start: 2018-03-02 | End: 2018-03-23

## 2018-03-02 NOTE — PROGRESS NOTES
SUBJECTIVE:   Bhavesh Schafer is a 86 year old male who presents to clinic today for the following health issues:      Chief Complaint   Patient presents with     Cough          Started with some fatigue for weeks, starting a month ago  Persistant cough x2 weeks- painful.    Lower appetite  Lost 9 lbs in a month and he is worried about that    Patient is here today with a friend who states that he just doesn't seem to be himself since he fell in January      ROS: As per HPI.  Constitutional: + recent illness  GI: no nausea/vomiting/diarrhea, no black or bloody stools  : no burning or urgency with urination  Skin: no rash      I have reviewed and updated the patient's past medical history in the EMR. Current problems are:    Patient Active Problem List   Diagnosis     Macular degeneration (senile) of retina     Retinal neovascularization     Persistent insomnia     HYPERLIPIDEMIA LDL GOAL <130     Hypertension goal BP (blood pressure) < 140/90     Iron deficiency anemia     Advanced directives, counseling/discussion     Right inguinal hernia     Acute urinary retention     History of depression     Other closed nondisplaced fracture of proximal end of left humerus, initial encounter     Past Surgical History:   Procedure Laterality Date     HC REMOVE TONSILS/ADENOIDS,<13 Y/O  age 8     HC REPAIR ING HERNIA 5+ YO,BALTAZAR  8/04    L, recurrent with small bowel incarceration     HC REPAIR ING HERNIA,5+Y/O,REDUCIBL      L     HC TOOTH EXTRACTION W/FORCEP       LAPAROSCOPIC HERNIORRHAPHY INGUINAL  7/12/2013    Procedure: LAPAROSCOPIC HERNIORRHAPHY INGUINAL;  Laparoscopic Right Inguinal Hernia Repair with Mesh ;  Surgeon: Martin Quinteros MD;  [facility]     LASER SURGERY OF EYE  8/04    L retina laser therapy for neovascular membrane       Social History   Substance Use Topics     Smoking status: Former Smoker     Quit date: 3/5/1979     Smokeless tobacco: Never Used     Alcohol use No      Comment: rare red  "wine     Family History   Problem Relation Age of Onset     CANCER Mother      colon     CEREBROVASCULAR DISEASE Father      ruptured anuerysm     Hypertension Father          Allergies, reviewed:     Allergies   Allergen Reactions     Atenolol Swelling     Fast pulse. Patient does not remember getting this medication.     No Known Allergies        Current Outpatient Prescriptions   Medication Sig Dispense Refill     azithromycin (ZITHROMAX) 250 MG tablet Two tablets first day, then one tablet daily for four days. 6 tablet 0     zolpidem (AMBIEN) 5 MG tablet Take 1 tablet (5 mg) by mouth nightly as needed for sleep 90 tablet 1     lisinopril-hydrochlorothiazide (PRINZIDE/ZESTORETIC) 20-25 MG per tablet TAKE 1 TABLET BY ORAL ROUTE ONCE DAILY 90 tablet prn     saw palmetto ([facility] SAW PALMETTO) 160 MG CAPS Take 3 capsules (480 mg) by mouth daily       Magnesium 250 MG tablet Take 1 tablet by mouth daily. 100 tablet 3     VITAMIN C 2000 MG OR TABS 1 TABLET DAILY 30 0       Objective:  /74  Pulse 78  Temp 98.2  F (36.8  C) (Oral)  Resp 15  Ht 5' 10\" (1.778 m)  Wt 141 lb 4.8 oz (64.1 kg)  SpO2 99%  BMI 20.27 kg/m2  General Appearance: Pleasant, alert, WN/WD in no acute respiratory distress.  Head Exam: Normal. Normocephalic, atraumatic. No sinus tenderness.  Eye Exam: Normal external eye, conjunctiva, lids. BETO.  Ear Exam: Normal auditory canals and external ears. Non-tender.  Left TM-normal. Right TM-normal.  OroPharynx Exam: Dental hygiene adequate. Normal buccal mucosa. Normal pharynx.  Neck Exam: Supple, no masses or enlarged, tender nodes.  Thyroid Exam: No nodules or enlargement or tenderness.  Chest/Respiratory Exam: Normal, comfortable, easy respirations. Chest wall normal. Lungs are clear to auscultation.  Scattered rhonchi.  Cardiovascular Exam: Regular rate and rhythm. No murmur, gallop, or rubs. No pedal edema.  Gastrointestinal Exam: Soft, non-tender, no masses or organomegaly.  Musculoskeletal " Exam: Back is non-tender, full ROM of upper and lower extremities.  Skin: no rash, warm and dry.  Neurologic Exam: Nonfocal, no tremor. Normal gait.  Psychiatric Exam: Alert - appropriate, normal affect    ASSESSMENT/PLAN:    ICD-10-CM    1. Acute bronchitis with symptoms > 10 days J20.9 azithromycin (ZITHROMAX) 250 MG tablet      Somewhat poor historian with a friend here saying that he just has not been right for quite a while  He does have this recent respiratory infection and he does not appear to be getting better from that      His lungs do not sound like pneumonia today but there are a few rhonchi   Prolonged course of bronchitis and we will try treating him with antibiotics    Differential diagnosis at this age includes a number of  More serious issues such as cancer    We discussed those today but patient declined additional lab testing and chest x-ray    Counselled on medication choice, use, side effects of medications, nonprescription adjunct treatment and appropriate follow up. Couns time: 20 minutes of 25 minutes total face to face time.          Eugene Maki MD MPH

## 2018-03-02 NOTE — MR AVS SNAPSHOT
"              After Visit Summary   3/2/2018    Bhavesh Schafer    MRN: [id number]           Patient Information     Date Of Birth          1931        Visit Information        Provider Department      3/2/2018 4:00 PM Eugene Maki MD [facility]        Today's Diagnoses     Acute bronchitis with symptoms > 10 days    -  1       Follow-ups after your visit        Follow-up notes from your care team     Return if symptoms worsen or fail to improve.      Who to contact     If you have questions or need follow up information about today's clinic visit or your schedule please contact [facility] directly at [phone].  Normal or non-critical lab and imaging results will be communicated to you by MyChart, letter or phone within 4 business days after the clinic has received the results. If you do not hear from us within 7 days, please contact the clinic through MyChart or phone. If you have a critical or abnormal lab result, we will notify you by phone as soon as possible.  Submit refill requests through Icarus or call your pharmacy and they will forward the refill request to us. Please allow 3 business days for your refill to be completed.          Additional Information About Your Visit        MyChart Information     Icarus lets you send messages to your doctor, view your test results, renew your prescriptions, schedule appointments and more. To sign up, go to [email or web address] . Click on \"Log in\" on the left side of the screen, which will take you to the Welcome page. Then click on \"Sign up Now\" on the right side of the page.     You will be asked to enter the access code listed below, as well as some personal information. Please follow the directions to create your username and password.     Your access code is: [email or web address]  Expires: 2018 11:17 AM     Your access code will  in 90 days. If you need help or a new code, please call your [place] clinic or " "[phone].        Care EveryWhere ID     This is your Care EveryWhere ID. This could be used by other organizations to access your [place] medical records  [id number]        Your Vitals Were     Pulse Temperature Respirations Height Pulse Oximetry BMI (Body Mass Index)    78 98.2  F (36.8  C) (Oral) 15 5' 10\" (1.778 m) 99% 20.27 kg/m2       Blood Pressure from Last 3 Encounters:   03/02/18 124/74   01/29/18 122/76   05/25/17 133/82    Weight from Last 3 Encounters:   03/02/18 141 lb 4.8 oz (64.1 kg)   01/29/18 150 lb (68 kg)   05/25/17 153 lb (69.4 kg)              Today, you had the following     No orders found for display         Today's Medication Changes          These changes are accurate as of 3/2/18 11:59 PM.  If you have any questions, ask your nurse or doctor.               Start taking these medicines.        Dose/Directions    azithromycin 250 MG tablet   Commonly known as:  ZITHROMAX   Used for:  Acute bronchitis with symptoms > 10 days   Started by:  Eugene Maki MD        Two tablets first day, then one tablet daily for four days.   Quantity:  6 tablet   Refills:  0            Where to get your medicines      These medications were sent to [facility][id number] - [place] [id number]     Phone:  [phone]     azithromycin 250 MG tablet                Primary Care Provider Office Phone # Fax #    Eugene Maki [phone] [address] [facility] [address]        Equal Access to Services     [facility] : Hadii grzegorz lombardo Somarc, waaxda luqadaha, qaybta kaalmada lynda ryan. So [facility] [place] [facility] [phone].    ATENCIÓN: Si habla español, tiene a torres disposición servicios gratuitos de asistencia lingüística. Llame al [phone].    We comply with applicable federal civil rights laws and [place] laws. We do not discriminate on the basis of race, " color, national origin, age, disability, sex, sexual orientation, or gender identity.            Thank you!     Thank you for choosing [facility]  for your care. Our goal is always to provide you with excellent care. Hearing back from our patients is one way we can continue to improve our services. Please take a few minutes to complete the written survey that you may receive in the mail after your visit with us. Thank you!             Your Updated Medication List - Protect others around you: Learn how to safely use, store and throw away your medicines at www.disposemymeds.org.          This list is accurate as of 3/2/18 11:59 PM.  Always use your most recent med list.                   Brand Name Dispense Instructions for use Diagnosis    azithromycin 250 MG tablet    ZITHROMAX    6 tablet    Two tablets first day, then one tablet daily for four days.    Acute bronchitis with symptoms > 10 days       [employer] SAW PALMETTO 160 MG Caps   Generic drug:  saw palmetto      Take 3 capsules (480 mg) by mouth daily        lisinopril-hydrochlorothiazide 20-25 MG per tablet    PRINZIDE/ZESTORETIC    90 tablet    TAKE 1 TABLET BY ORAL ROUTE ONCE DAILY    Essential hypertension with goal blood pressure less than 140/90       magnesium 250 MG tablet     100 tablet    Take 1 tablet by mouth daily.        VITAMIN C 2000 MG Tabs     30    1 TABLET DAILY    Routine general medical examination at a health care facility       zolpidem 5 MG tablet    AMBIEN    90 tablet    Take 1 tablet (5 mg) by mouth nightly as needed for sleep    Persistent insomnia

## 2018-03-19 DIAGNOSIS — G47.00 PERSISTENT INSOMNIA: ICD-10-CM

## 2018-03-20 RX ORDER — ZOLPIDEM TARTRATE 5 MG/1
5 TABLET ORAL
Qty: 90 TABLET | Refills: 1 | OUTPATIENT
Start: 2018-03-20

## 2018-03-20 NOTE — TELEPHONE ENCOUNTER
Patient informed.  States he has taken this for a long time  Told him he needs appt to discuss Ambien and/or other options  Appt scheduled  Ivelisse TRAORE RN

## 2018-03-20 NOTE — TELEPHONE ENCOUNTER
LUIS ANTONIO,    Controlled Substance Refill Request for Ambien    Last refill: 8/23/2017 #90    Last clinic visit: 1/29/2018     Clinic visit frequency required: Q 6  months  Next appt: none    Controlled substance agreement on file: No.    Documentation in problem list reviewed:  Yes    Processing:  [facility]ax Rx [facility]    [place] reviewed[place]  reviewed- recommend provider review  Patient filled Oxycodone #15 on 1/3/2018 from outside provider   [place]:  [email or web address]    Please authorize if appropriate.  Thanks,  Ivelisse TRAORE RN

## 2018-03-23 ENCOUNTER — OFFICE VISIT (OUTPATIENT)
Dept: FAMILY MEDICINE | Facility: CLINIC | Age: 83
End: 2018-03-23
Payer: MEDICARE

## 2018-03-23 VITALS
RESPIRATION RATE: 16 BRPM | TEMPERATURE: 97.6 F | SYSTOLIC BLOOD PRESSURE: 112 MMHG | DIASTOLIC BLOOD PRESSURE: 66 MMHG | OXYGEN SATURATION: 97 % | HEART RATE: 78 BPM

## 2018-03-23 DIAGNOSIS — J20.9 ACUTE BRONCHITIS WITH SYMPTOMS > 10 DAYS: ICD-10-CM

## 2018-03-23 DIAGNOSIS — G47.00 PERSISTENT INSOMNIA: ICD-10-CM

## 2018-03-23 PROCEDURE — 99214 OFFICE O/P EST MOD 30 MIN: CPT | Performed by: FAMILY MEDICINE

## 2018-03-23 RX ORDER — ZOLPIDEM TARTRATE 5 MG/1
5 TABLET ORAL
Qty: 90 TABLET | Refills: 1 | Status: CANCELLED | OUTPATIENT
Start: 2018-03-23

## 2018-03-23 NOTE — PATIENT INSTRUCTIONS
Alternatives to ambien    OK to take any of these with the other    Warm milk 30 minutes before bedtime    melatonin if taken should be 2-3 hours before bedtime      Also alternative (stronger than melatonin)    valerian root 300-400mg -    Hops 300-400mg Nightly 45 minutes before bed

## 2018-03-23 NOTE — NURSING NOTE
"Chief Complaint   Patient presents with     RECHECK     Ambien.Sleep has improved      Initial /66  Pulse 78  Temp 97.6  F (36.4  C) (Oral)  Resp 16  SpO2 97% Estimated body mass index is 20.27 kg/(m^2) as calculated from the following:    Height as of 3/2/18: 5' 10\" (1.778 m).    Weight as of 3/2/18: 141 lb 4.8 oz (64.1 kg).  BP completed using cuff size: large. L arm       Health Maintenance that is potentially due pending provider review:   NONE       Jessica Trinidad CMA     "

## 2018-03-23 NOTE — PROGRESS NOTES
SUBJECTIVE:   Bhavesh Schafer is a 86 year old male who presents to clinic today for the following health issues:    Last visit, Started with some fatigue for weeks  Persistant cough x2 weeks- painful.    Lower appetite  Lost 9 lbs in a month and he is worried about that    We started abx and he says he is much better    Started by another provider on ambien for sleep    Medication Followup of Zolpidem     Taking Medication as prescribed: yes    Side Effects:  None    Medication Helping Symptoms:  NO       Current symptoms are described by the PHQ9/BRITTANY below.  PHQ-9 SCORE 4/7/2015 5/24/2016 1/29/2018   Total Score 0 - -   Total Score - 2 8     No flowsheet data found.      Previous treatment modalities employed include melatonin     No known medical causes of depression or Anxiety.    ROS:  Musculoskeletal: Normal; movements are symmetrical, no weakness  Neuro: Normal; no tremor  Psych: No suicidal thoughts or plan. No halluciations.    Social History  Social History     Social History     Marital status: Single     Spouse name: Linda Alvares     Number of children: 3     Years of education: 20     Occupational History     retired       - [employer]     Social History Main Topics     Smoking status: Former Smoker     Quit date: 3/5/1979     Smokeless tobacco: Never Used     Alcohol use No      Comment: rare red wine     Drug use: No     Sexual activity: Yes     Partners: Female     Birth control/ protection: Rhythm     Other Topics Concern      Service No     Blood Transfusions No     Caffeine Concern Yes     one cup of coffee per day     Occupational Exposure No     Hobby Hazards No     Sleep Concern No     Stress Concern No     Weight Concern No     Special Diet No     Back Care No     Exercise Yes     Bike Helmet No     Seat Belt Yes     Self-Exams No     Parent/Sibling W/ Cabg, Mi Or Angioplasty Before 65f 55m? No     Social History Narrative    Social Documentation:12/09        Balanced  Diet: YES    Calcium intake: milk and food per day    Caffeine: 1 small per day    Exercise:  type of activity bike    Sunscreen: No    Seatbelts:  Yes    Self Breast Exam:  NA    Self Testicular Exam: Yes    Physical/Emotional/Sexual Abuse: No    Do you feel safe in your environment? Yes        Cholesterol screen up to date: Yes    Eye Exam up to date: Yes    Dental Exam up to date: Yes    Pap smear up to date: Does Not Apply    Mammogram up to date: Does Not Apply    Dexa Scan up to date: Does Not Apply    Colonoscopy up to date: declined    Immunizations up to date: No unsure    Glucose screen if over 40:  Yes    Sreekanth Garcia ma                           Medications:    Current Outpatient Prescriptions:      lisinopril-hydrochlorothiazide (PRINZIDE/ZESTORETIC) 20-25 MG per tablet, TAKE 1 TABLET BY ORAL ROUTE ONCE DAILY, Disp: 90 tablet, Rfl: prn     saw palmetto (CVS SAW PALMETTO) 160 MG CAPS, Take 3 capsules (480 mg) by mouth daily, Disp: , Rfl:      Magnesium 250 MG tablet, Take 1 tablet by mouth daily., Disp: 100 tablet, Rfl: 3     VITAMIN C 2000 MG OR TABS, 1 TABLET DAILY, Disp: 30, Rfl: 0       Allergies   Allergen Reactions     Atenolol Swelling     Fast pulse. Patient does not remember getting this medication.     No Known Allergies            OBJECTIVE:  /66  Pulse 78  Temp 97.6  F (36.4  C) (Oral)  Resp 16  SpO2 97%  Mental Status exam  GROOMING: well groomed.  ATTIRE: Appropriate.  AGE: Appears stated.  BEHAVIOR TOWARDS EXAMINER: Cooperative.  MOTOR ACTIVITY: Unremarkable.  EYE CONTACT: Appropriate.  Mood:  good  Affect:  appropriate and in normal range  SPEECH/LANGUAGE: Unremarkable.  ATTENTION: Unremarkable.  CONCENTRATION: Unremarkable.  THOUGHT PROCESS: Unremarkable  THOUGHT CONTENT: Unremarkable for hallucinations and delusions.  ORIENTATION: Times 3.  MEMORY: No evidence of impairment.  JUDGMENT: No evidence of impairment.  ESTIMATED INTELLIGENCE: above Average.  DEMONSTRATED  INSIGHT: good  FUND OF KNOWLEDGE: good    ASSESSMENT/PLAN:  1. Persistent insomnia  No known medical causes of insomnia.  The problem of recurrent insomnia is discussed. Avoidance of caffeine sources is strongly encouraged. Sleep hygiene issues are reviewed. The use of sedative hypnotics discussed, generally contraindicated in people over the age of 70, increased risk of death    Recommended other measures, see pt inst    2. Acute bronchitis with symptoms > 10 days  Improved      Counselled on medication choice, use, side effects of medications, nonprescription adjunct treatment and appropriate follow up. Couns time: 20 minutes of 25 minutes total face to face time.    Eugene Maki MD MPH

## 2018-03-23 NOTE — MR AVS SNAPSHOT
"              After Visit Summary   3/23/2018    Bhavesh Schafer    MRN: [id number]           Patient Information     Date Of Birth          4/18/1931        Visit Information        Provider Department      3/23/2018 1:30 PM Eugene Maki MD [facility]        Today's Diagnoses     Persistent insomnia          Care Instructions      Alternatives to ambien    OK to take any of these with the other    Warm milk 30 minutes before bedtime    melatonin if taken should be 2-3 hours before bedtime      Also alternative (stronger than melatonin)    valerian root 300-400mg -    Hops 300-400mg Nightly 45 minutes before bed                Follow-ups after your visit        Who to contact     If you have questions or need follow up information about today's clinic visit or your schedule please contact [facility] directly at [phone].  Normal or non-critical lab and imaging results will be communicated to you by YellowPepperhart, letter or phone within 4 business days after the clinic has received the results. If you do not hear from us within 7 days, please contact the clinic through YellowPepperhart or phone. If you have a critical or abnormal lab result, we will notify you by phone as soon as possible.  Submit refill requests through ividence or call your pharmacy and they will forward the refill request to us. Please allow 3 business days for your refill to be completed.          Additional Information About Your Visit        MyChart Information     ividence lets you send messages to your doctor, view your test results, renew your prescriptions, schedule appointments and more. To sign up, go to [email or web address] . Click on \"Log in\" on the left side of the screen, which will take you to the Welcome page. Then click on \"Sign up Now\" on the right side of the page.     You will be asked to enter the access code listed below, as well as some personal information. Please follow the directions to create " your username and password.     Your access code is: [email or web address]  Expires: 2018 12:17 PM     Your access code will  in 90 days. If you need help or a new code, please call your [place] clinic or [phone].        Care EveryWhere ID     This is your Care EveryWhere ID. This could be used by other organizations to access your [place] medical records  [id number]        Your Vitals Were     Pulse Temperature Respirations Pulse Oximetry          78 97.6  F (36.4  C) (Oral) 16 97%         Blood Pressure from Last 3 Encounters:   18 112/66   18 124/74   18 122/76    Weight from Last 3 Encounters:   18 141 lb 4.8 oz (64.1 kg)   18 150 lb (68 kg)   17 153 lb (69.4 kg)              Today, you had the following     No orders found for display         Today's Medication Changes          These changes are accurate as of 3/23/18  1:50 PM.  If you have any questions, ask your nurse or doctor.               Stop taking these medicines if you haven't already. Please contact your care team if you have questions.     zolpidem 5 MG tablet   Commonly known as:  AMBIEN   Stopped by:  Eugene Maki MD                    Primary Care Provider Office Phone # Fax #    Eugene Maki [phone] [address] [facility] [address]        Equal Access to Services     [facility] AH: Hadii aad ku hadasho Soomaali, waaxda luqadaha, qaybta kaalmada goldenyadusty, waxay ami potts . So [facility] [phone].    ATENCIÓN: Si habla español, tiene a torres disposición servicios gratuitos de asistencia lingüística. Llame al [phone].    We comply with applicable federal civil rights laws and [place] laws. We do not discriminate on the basis of race, color, national origin, age, disability, sex, sexual orientation, or gender identity.            Thank you!     Thank you for choosing [facility]  for your care. Our goal is  always to provide you with excellent care. Hearing back from our patients is one way we can continue to improve our services. Please take a few minutes to complete the written survey that you may receive in the mail after your visit with us. Thank you!             Your Updated Medication List - Protect others around you: Learn how to safely use, store and throw away your medicines at www.disposemymeds.org.          This list is accurate as of 3/23/18  1:50 PM.  Always use your most recent med list.                   Brand Name Dispense Instructions for use Diagnosis    CVS SAW PALMETTO 160 MG Caps   Generic drug:  saw palmetto      Take 3 capsules (480 mg) by mouth daily        lisinopril-hydrochlorothiazide 20-25 MG per tablet    PRINZIDE/ZESTORETIC    90 tablet    TAKE 1 TABLET BY ORAL ROUTE ONCE DAILY    Essential hypertension with goal blood pressure less than 140/90       magnesium 250 MG tablet     100 tablet    Take 1 tablet by mouth daily.        VITAMIN C 2000 MG Tabs     30    1 TABLET DAILY    Routine general medical examination at a health care facility

## 2018-05-26 DIAGNOSIS — I10 ESSENTIAL HYPERTENSION WITH GOAL BLOOD PRESSURE LESS THAN 140/90: ICD-10-CM

## 2018-05-26 NOTE — TELEPHONE ENCOUNTER
Reason for call:  Medication   If this is a refill request, has the caller requested the refill from the pharmacy already? Yes  Will the patient be using a [facility]? Yes  Name of the pharmacy and phone number for the current request: [facility] [phone]    Name of the medication requested: lisinopril-hydrochlorothiazide (PRINZIDE/ZESTORETIC) 20-25 MG per tablet    Other request: Needs medication refill asap only has a couple of pills left    Phone number to reach patient:  Home number on file [phone] (home)    Best Time:  Anytime     Can we leave a detailed message on this number?  YES

## 2018-05-29 RX ORDER — LISINOPRIL AND HYDROCHLOROTHIAZIDE 20; 25 MG/1; MG/1
TABLET ORAL
Qty: 90 TABLET | Refills: 0 | Status: SHIPPED | OUTPATIENT
Start: 2018-05-29 | End: 2018-08-27

## 2018-05-29 NOTE — TELEPHONE ENCOUNTER
Prescription approved per [facility] Refill Protocol.  Bernarda Taylor RN  Requested Prescriptions   Signed Prescriptions Disp Refills     lisinopril-hydrochlorothiazide (PRINZIDE/ZESTORETIC) 20-25 MG per tablet 90 tablet 0     Sig: TAKE 1 TABLET BY ORAL ROUTE ONCE DAILY    There is no refill protocol information for this order        Labs last done 1/2018  Bernarda Taylor RN

## 2018-08-27 DIAGNOSIS — I10 ESSENTIAL HYPERTENSION WITH GOAL BLOOD PRESSURE LESS THAN 140/90: ICD-10-CM

## 2018-08-28 RX ORDER — LISINOPRIL AND HYDROCHLOROTHIAZIDE 20; 25 MG/1; MG/1
TABLET ORAL
Qty: 90 TABLET | Refills: 1 | Status: SHIPPED | OUTPATIENT
Start: 2018-08-28 | End: 2019-02-17

## 2018-08-28 NOTE — TELEPHONE ENCOUNTER
"Requested Prescriptions   Pending Prescriptions Disp Refills     lisinopril-hydrochlorothiazide (PRINZIDE/ZESTORETIC) 20-25 MG per tablet [Pharmacy Med Name: Lisinopril-Hydrochlorothiazide Oral Tablet 20-25 MG] 90 tablet 0     Sig: TAKE ONE TABLET BY MOUTH ONE TIME DAILY    Diuretics (Including Combos) Protocol Passed    8/27/2018 11:01 AM       Passed - Blood pressure under 140/90 in past 12 months    BP Readings from Last 3 Encounters:   03/23/18 112/66   03/02/18 124/74   01/29/18 122/76                Passed - Recent (12 mo) or future (30 days) visit within the authorizing provider's specialty    Patient had office visit in the last 12 months or has a visit in the next 30 days with authorizing provider or within the authorizing provider's specialty.  See \"Patient Info\" tab in inbasket, or \"Choose Columns\" in Meds & Orders section of the refill encounter.           Passed - Patient is age 18 or older       Passed - Normal serum creatinine on file in past 12 months    Recent Labs   Lab Test  01/29/18   1114   CR  1.12             Passed - Normal serum potassium on file in past 12 months    Recent Labs   Lab Test  01/29/18   1114   POTASSIUM  4.3                   Passed - Normal serum sodium on file in past 12 months    Recent Labs   Lab Test  01/29/18   1114   NA  134                Prescription approved per [facility] Refill Protocol.  "

## 2019-01-01 ENCOUNTER — HOSPITAL ENCOUNTER (OUTPATIENT)
Dept: CT IMAGING | Facility: CLINIC | Age: 84
Discharge: HOME | End: 2019-06-20
Attending: SURGERY | Admitting: SURGERY
Payer: MEDICARE

## 2019-01-01 ENCOUNTER — PATIENT OUTREACH (OUTPATIENT)
Dept: CARE COORDINATION | Facility: CLINIC | Age: 84
End: 2019-01-01

## 2019-01-01 ENCOUNTER — HOSPITAL ENCOUNTER (OUTPATIENT)
Facility: CLINIC | Age: 84
Setting detail: OBSERVATION
Discharge: HOME | End: 2019-10-30
Attending: HOSPITALIST | Admitting: INTERNAL MEDICINE
Payer: MEDICARE

## 2019-01-01 ENCOUNTER — TELEPHONE (OUTPATIENT)
Dept: SURGERY | Facility: CLINIC | Age: 84
End: 2019-01-01

## 2019-01-01 ENCOUNTER — TELEPHONE (OUTPATIENT)
Dept: FAMILY MEDICINE | Facility: CLINIC | Age: 84
End: 2019-01-01

## 2019-01-01 ENCOUNTER — HOSPITAL ENCOUNTER (OUTPATIENT)
Facility: CLINIC | Age: 84
Discharge: HOME | End: 2019-10-12
Attending: SURGERY | Admitting: SURGERY
Payer: MEDICARE

## 2019-01-01 ENCOUNTER — APPOINTMENT (OUTPATIENT)
Dept: PHYSICAL THERAPY | Facility: CLINIC | Age: 84
End: 2019-01-01
Attending: INTERNAL MEDICINE
Payer: MEDICARE

## 2019-01-01 ENCOUNTER — ANESTHESIA (OUTPATIENT)
Dept: SURGERY | Facility: CLINIC | Age: 84
End: 2019-01-01
Payer: MEDICARE

## 2019-01-01 ENCOUNTER — APPOINTMENT (OUTPATIENT)
Dept: SURGERY | Facility: PHYSICIAN GROUP | Age: 84
End: 2019-01-01
Payer: MEDICARE

## 2019-01-01 ENCOUNTER — OFFICE VISIT (OUTPATIENT)
Dept: FAMILY MEDICINE | Facility: CLINIC | Age: 84
End: 2019-01-01
Payer: MEDICARE

## 2019-01-01 ENCOUNTER — DOCUMENTATION ONLY (OUTPATIENT)
Dept: CARE COORDINATION | Facility: CLINIC | Age: 84
End: 2019-01-01

## 2019-01-01 ENCOUNTER — OFFICE VISIT (OUTPATIENT)
Dept: SURGERY | Facility: CLINIC | Age: 84
End: 2019-01-01
Payer: MEDICARE

## 2019-01-01 ENCOUNTER — ANESTHESIA EVENT (OUTPATIENT)
Dept: SURGERY | Facility: CLINIC | Age: 84
End: 2019-01-01
Payer: MEDICARE

## 2019-01-01 ENCOUNTER — APPOINTMENT (OUTPATIENT)
Dept: MRI IMAGING | Facility: CLINIC | Age: 84
End: 2019-01-01
Attending: INTERNAL MEDICINE
Payer: MEDICARE

## 2019-01-01 ENCOUNTER — ANCILLARY PROCEDURE (OUTPATIENT)
Dept: GENERAL RADIOLOGY | Facility: CLINIC | Age: 84
End: 2019-01-01
Attending: FAMILY MEDICINE
Payer: MEDICARE

## 2019-01-01 VITALS
HEART RATE: 77 BPM | BODY MASS INDEX: 24.05 KG/M2 | WEIGHT: 168 LBS | SYSTOLIC BLOOD PRESSURE: 104 MMHG | HEIGHT: 70 IN | DIASTOLIC BLOOD PRESSURE: 58 MMHG

## 2019-01-01 VITALS
WEIGHT: 137.6 LBS | SYSTOLIC BLOOD PRESSURE: 97 MMHG | TEMPERATURE: 98.3 F | BODY MASS INDEX: 19.74 KG/M2 | HEART RATE: 92 BPM | DIASTOLIC BLOOD PRESSURE: 59 MMHG | OXYGEN SATURATION: 97 %

## 2019-01-01 VITALS
DIASTOLIC BLOOD PRESSURE: 73 MMHG | OXYGEN SATURATION: 96 % | HEART RATE: 82 BPM | SYSTOLIC BLOOD PRESSURE: 116 MMHG | TEMPERATURE: 97.9 F | BODY MASS INDEX: 21.02 KG/M2 | HEIGHT: 67 IN | RESPIRATION RATE: 18 BRPM

## 2019-01-01 VITALS
SYSTOLIC BLOOD PRESSURE: 120 MMHG | HEART RATE: 78 BPM | WEIGHT: 130 LBS | TEMPERATURE: 97.8 F | OXYGEN SATURATION: 95 % | DIASTOLIC BLOOD PRESSURE: 77 MMHG | HEIGHT: 67 IN | RESPIRATION RATE: 18 BRPM | BODY MASS INDEX: 20.4 KG/M2

## 2019-01-01 VITALS
HEART RATE: 76 BPM | WEIGHT: 135.2 LBS | DIASTOLIC BLOOD PRESSURE: 71 MMHG | OXYGEN SATURATION: 98 % | HEIGHT: 70 IN | BODY MASS INDEX: 19.36 KG/M2 | SYSTOLIC BLOOD PRESSURE: 138 MMHG | TEMPERATURE: 96.9 F

## 2019-01-01 VITALS
OXYGEN SATURATION: 95 % | BODY MASS INDEX: 19.14 KG/M2 | WEIGHT: 122.2 LBS | TEMPERATURE: 98.7 F | SYSTOLIC BLOOD PRESSURE: 121 MMHG | HEART RATE: 66 BPM | RESPIRATION RATE: 16 BRPM | DIASTOLIC BLOOD PRESSURE: 72 MMHG

## 2019-01-01 VITALS
HEART RATE: 72 BPM | BODY MASS INDEX: 21.06 KG/M2 | RESPIRATION RATE: 16 BRPM | DIASTOLIC BLOOD PRESSURE: 67 MMHG | HEIGHT: 67 IN | SYSTOLIC BLOOD PRESSURE: 119 MMHG | TEMPERATURE: 96.4 F | WEIGHT: 134.2 LBS | OXYGEN SATURATION: 97 %

## 2019-01-01 VITALS
DIASTOLIC BLOOD PRESSURE: 78 MMHG | SYSTOLIC BLOOD PRESSURE: 124 MMHG | BODY MASS INDEX: 19.61 KG/M2 | RESPIRATION RATE: 16 BRPM | OXYGEN SATURATION: 95 % | HEART RATE: 98 BPM | WEIGHT: 137 LBS | HEIGHT: 70 IN

## 2019-01-01 DIAGNOSIS — I10 ESSENTIAL HYPERTENSION WITH GOAL BLOOD PRESSURE LESS THAN 140/90: Primary | ICD-10-CM

## 2019-01-01 DIAGNOSIS — S32.000D CLOSED COMPRESSION FRACTURE OF LUMBOSACRAL SPINE WITH ROUTINE HEALING, SUBSEQUENT ENCOUNTER: ICD-10-CM

## 2019-01-01 DIAGNOSIS — K59.03 DRUG-INDUCED CONSTIPATION: ICD-10-CM

## 2019-01-01 DIAGNOSIS — Z23 NEED FOR VACCINATION: ICD-10-CM

## 2019-01-01 DIAGNOSIS — S22.080A T12 COMPRESSION FRACTURE, INITIAL ENCOUNTER (H): ICD-10-CM

## 2019-01-01 DIAGNOSIS — M54.50 LUMBOSACRAL PAIN: Primary | ICD-10-CM

## 2019-01-01 DIAGNOSIS — R33.9 URINARY RETENTION: ICD-10-CM

## 2019-01-01 DIAGNOSIS — S32.000D CLOSED COMPRESSION FRACTURE OF LUMBOSACRAL SPINE WITH ROUTINE HEALING, SUBSEQUENT ENCOUNTER: Primary | ICD-10-CM

## 2019-01-01 DIAGNOSIS — N40.1 BENIGN PROSTATIC HYPERPLASIA WITH LOWER URINARY TRACT SYMPTOMS, SYMPTOM DETAILS UNSPECIFIED: ICD-10-CM

## 2019-01-01 DIAGNOSIS — Z53.9 DIAGNOSIS NOT YET DEFINED: Primary | ICD-10-CM

## 2019-01-01 DIAGNOSIS — K40.91 UNILATERAL RECURRENT INGUINAL HERNIA WITHOUT OBSTRUCTION OR GANGRENE: ICD-10-CM

## 2019-01-01 DIAGNOSIS — K40.90 LEFT INGUINAL HERNIA: ICD-10-CM

## 2019-01-01 DIAGNOSIS — D50.8 OTHER IRON DEFICIENCY ANEMIA: ICD-10-CM

## 2019-01-01 DIAGNOSIS — R10.84 ABDOMINAL PAIN, GENERALIZED: ICD-10-CM

## 2019-01-01 DIAGNOSIS — J20.9 ACUTE BRONCHITIS WITH SYMPTOMS > 10 DAYS: ICD-10-CM

## 2019-01-01 DIAGNOSIS — R35.0 URINARY FREQUENCY: ICD-10-CM

## 2019-01-01 DIAGNOSIS — R33.8 ACUTE URINARY RETENTION: ICD-10-CM

## 2019-01-01 DIAGNOSIS — Z01.818 PREOP GENERAL PHYSICAL EXAM: Primary | ICD-10-CM

## 2019-01-01 DIAGNOSIS — M54.50 LUMBOSACRAL PAIN: ICD-10-CM

## 2019-01-01 DIAGNOSIS — Z12.5 ENCOUNTER FOR SCREENING FOR MALIGNANT NEOPLASM OF PROSTATE: ICD-10-CM

## 2019-01-01 DIAGNOSIS — I10 HYPERTENSION GOAL BP (BLOOD PRESSURE) < 140/90: ICD-10-CM

## 2019-01-01 DIAGNOSIS — K40.90 LEFT INGUINAL HERNIA: Primary | ICD-10-CM

## 2019-01-01 DIAGNOSIS — K40.90 INGUINAL HERNIA WITHOUT OBSTRUCTION OR GANGRENE, RECURRENCE NOT SPECIFIED, UNSPECIFIED LATERALITY: ICD-10-CM

## 2019-01-01 DIAGNOSIS — R10.84 ABDOMINAL PAIN, GENERALIZED: Primary | ICD-10-CM

## 2019-01-01 LAB
ALBUMIN SERPL-MCNC: 3.6 G/DL (ref 3.4–5)
ANION GAP SERPL CALCULATED.3IONS-SCNC: 10 MMOL/L (ref 3–14)
ANION GAP SERPL CALCULATED.3IONS-SCNC: 4 MMOL/L (ref 3–14)
ANION GAP SERPL CALCULATED.3IONS-SCNC: 7 MMOL/L (ref 3–14)
ANION GAP SERPL CALCULATED.3IONS-SCNC: 9 MMOL/L (ref 3–14)
BUN SERPL-MCNC: 23 MG/DL (ref 7–30)
BUN SERPL-MCNC: 24 MG/DL (ref 7–30)
BUN SERPL-MCNC: 34 MG/DL (ref 7–30)
BUN SERPL-MCNC: 38 MG/DL (ref 7–30)
CALCIUM SERPL-MCNC: 8.8 MG/DL (ref 8.5–10.1)
CALCIUM SERPL-MCNC: 9.1 MG/DL (ref 8.5–10.1)
CALCIUM SERPL-MCNC: 9.4 MG/DL (ref 8.5–10.1)
CALCIUM SERPL-MCNC: 9.5 MG/DL (ref 8.5–10.1)
CHLORIDE SERPL-SCNC: 102 MMOL/L (ref 94–109)
CHLORIDE SERPL-SCNC: 92 MMOL/L (ref 94–109)
CHLORIDE SERPL-SCNC: 96 MMOL/L (ref 94–109)
CHLORIDE SERPL-SCNC: 98 MMOL/L (ref 94–109)
CO2 SERPL-SCNC: 24 MMOL/L (ref 20–32)
CO2 SERPL-SCNC: 27 MMOL/L (ref 20–32)
CO2 SERPL-SCNC: 29 MMOL/L (ref 20–32)
CO2 SERPL-SCNC: 30 MMOL/L (ref 20–32)
CREAT BLD-MCNC: 1 MG/DL (ref 0.66–1.25)
CREAT SERPL-MCNC: 0.97 MG/DL (ref 0.66–1.25)
CREAT SERPL-MCNC: 1.01 MG/DL (ref 0.66–1.25)
CREAT SERPL-MCNC: 1.09 MG/DL (ref 0.66–1.25)
CREAT SERPL-MCNC: 1.13 MG/DL (ref 0.66–1.25)
ERYTHROCYTE [DISTWIDTH] IN BLOOD BY AUTOMATED COUNT: 13 % (ref 10–15)
ERYTHROCYTE [DISTWIDTH] IN BLOOD BY AUTOMATED COUNT: 13.1 % (ref 10–15)
GFR SERPL CREATININE-BSD FRML MDRD: 58 ML/MIN/{1.73_M2}
GFR SERPL CREATININE-BSD FRML MDRD: 60 ML/MIN/{1.73_M2}
GFR SERPL CREATININE-BSD FRML MDRD: 66 ML/MIN/{1.73_M2}
GFR SERPL CREATININE-BSD FRML MDRD: 69 ML/MIN/{1.73_M2}
GFR SERPL CREATININE-BSD FRML MDRD: 71 ML/MIN/{1.73_M2}
GLUCOSE SERPL-MCNC: 113 MG/DL (ref 70–99)
GLUCOSE SERPL-MCNC: 145 MG/DL (ref 70–99)
GLUCOSE SERPL-MCNC: 84 MG/DL (ref 70–99)
GLUCOSE SERPL-MCNC: 97 MG/DL (ref 70–99)
HCT VFR BLD AUTO: 35.2 % (ref 40–53)
HCT VFR BLD AUTO: 40.4 % (ref 40–53)
HGB BLD-MCNC: 11.8 G/DL (ref 13.3–17.7)
HGB BLD-MCNC: 12.6 G/DL (ref 13.3–17.7)
HGB BLD-MCNC: 13.4 G/DL (ref 13.3–17.7)
MCH RBC QN AUTO: 31.4 PG (ref 26.5–33)
MCH RBC QN AUTO: 31.5 PG (ref 26.5–33)
MCHC RBC AUTO-ENTMCNC: 33.2 G/DL (ref 31.5–36.5)
MCHC RBC AUTO-ENTMCNC: 33.5 G/DL (ref 31.5–36.5)
MCV RBC AUTO: 94 FL (ref 78–100)
MCV RBC AUTO: 95 FL (ref 78–100)
PHOSPHATE SERPL-MCNC: 3.1 MG/DL (ref 2.5–4.5)
PLATELET # BLD AUTO: 333 10E9/L (ref 150–450)
PLATELET # BLD AUTO: 388 10E9/L (ref 150–450)
POTASSIUM SERPL-SCNC: 3.9 MMOL/L (ref 3.4–5.3)
POTASSIUM SERPL-SCNC: 4.3 MMOL/L (ref 3.4–5.3)
POTASSIUM SERPL-SCNC: 4.5 MMOL/L (ref 3.4–5.3)
POTASSIUM SERPL-SCNC: 4.5 MMOL/L (ref 3.4–5.3)
PSA SERPL-ACNC: 2.8 UG/L (ref 0–4)
RBC # BLD AUTO: 3.76 10E12/L (ref 4.4–5.9)
RBC # BLD AUTO: 4.26 10E12/L (ref 4.4–5.9)
SODIUM SERPL-SCNC: 128 MMOL/L (ref 133–144)
SODIUM SERPL-SCNC: 130 MMOL/L (ref 133–144)
SODIUM SERPL-SCNC: 134 MMOL/L (ref 133–144)
SODIUM SERPL-SCNC: 136 MMOL/L (ref 133–144)
WBC # BLD AUTO: 9.2 10E9/L (ref 4–11)
WBC # BLD AUTO: 9.4 10E9/L (ref 4–11)

## 2019-01-01 PROCEDURE — 90662 IIV NO PRSV INCREASED AG IM: CPT | Performed by: FAMILY MEDICINE

## 2019-01-01 PROCEDURE — 25000128 H RX IP 250 OP 636: Performed by: SURGERY

## 2019-01-01 PROCEDURE — 85018 HEMOGLOBIN: CPT | Performed by: FAMILY MEDICINE

## 2019-01-01 PROCEDURE — 25800030 ZZH RX IP 258 OP 636: Performed by: NURSE ANESTHETIST, CERTIFIED REGISTERED

## 2019-01-01 PROCEDURE — 97161 PT EVAL LOW COMPLEX 20 MIN: CPT | Mod: GP | Performed by: PHYSICAL THERAPIST

## 2019-01-01 PROCEDURE — 27210794 ZZH OR GENERAL SUPPLY STERILE: Performed by: SURGERY

## 2019-01-01 PROCEDURE — 82565 ASSAY OF CREATININE: CPT

## 2019-01-01 PROCEDURE — 40000170 ZZH STATISTIC PRE-PROCEDURE ASSESSMENT II: Performed by: SURGERY

## 2019-01-01 PROCEDURE — 80048 BASIC METABOLIC PNL TOTAL CA: CPT | Performed by: FAMILY MEDICINE

## 2019-01-01 PROCEDURE — 85027 COMPLETE CBC AUTOMATED: CPT | Performed by: FAMILY MEDICINE

## 2019-01-01 PROCEDURE — 25000132 ZZH RX MED GY IP 250 OP 250 PS 637: Mod: GY | Performed by: INTERNAL MEDICINE

## 2019-01-01 PROCEDURE — 99213 OFFICE O/P EST LOW 20 MIN: CPT | Performed by: SURGERY

## 2019-01-01 PROCEDURE — 49650 LAP ING HERNIA REPAIR INIT: CPT | Mod: LT | Performed by: SURGERY

## 2019-01-01 PROCEDURE — 71000012 ZZH RECOVERY PHASE 1 LEVEL 1 FIRST HR: Performed by: SURGERY

## 2019-01-01 PROCEDURE — 25000132 ZZH RX MED GY IP 250 OP 250 PS 637: Mod: GY | Performed by: SURGERY

## 2019-01-01 PROCEDURE — 25000128 H RX IP 250 OP 636: Performed by: ANESTHESIOLOGY

## 2019-01-01 PROCEDURE — 99220 ZZC INITIAL OBSERVATION CARE,LEVL III: CPT | Performed by: INTERNAL MEDICINE

## 2019-01-01 PROCEDURE — G0180 MD CERTIFICATION HHA PATIENT: HCPCS | Performed by: FAMILY MEDICINE

## 2019-01-01 PROCEDURE — 37000009 ZZH ANESTHESIA TECHNICAL FEE, EACH ADDTL 15 MIN: Performed by: SURGERY

## 2019-01-01 PROCEDURE — 36415 COLL VENOUS BLD VENIPUNCTURE: CPT | Performed by: FAMILY MEDICINE

## 2019-01-01 PROCEDURE — 25000125 ZZHC RX 250: Performed by: SURGERY

## 2019-01-01 PROCEDURE — 36415 COLL VENOUS BLD VENIPUNCTURE: CPT | Performed by: INTERNAL MEDICINE

## 2019-01-01 PROCEDURE — 99207 ZZC CDG-CODE CATEGORY CHANGED: CPT | Performed by: STUDENT IN AN ORGANIZED HEALTH CARE EDUCATION/TRAINING PROGRAM

## 2019-01-01 PROCEDURE — 71000013 ZZH RECOVERY PHASE 1 LEVEL 1 EA ADDTL HR: Performed by: SURGERY

## 2019-01-01 PROCEDURE — 25000128 H RX IP 250 OP 636: Performed by: NURSE ANESTHETIST, CERTIFIED REGISTERED

## 2019-01-01 PROCEDURE — G0103 PSA SCREENING: HCPCS | Performed by: FAMILY MEDICINE

## 2019-01-01 PROCEDURE — 72148 MRI LUMBAR SPINE W/O DYE: CPT

## 2019-01-01 PROCEDURE — 80069 RENAL FUNCTION PANEL: CPT | Performed by: FAMILY MEDICINE

## 2019-01-01 PROCEDURE — 99214 OFFICE O/P EST MOD 30 MIN: CPT | Performed by: FAMILY MEDICINE

## 2019-01-01 PROCEDURE — 25000125 ZZHC RX 250: Performed by: NURSE ANESTHETIST, CERTIFIED REGISTERED

## 2019-01-01 PROCEDURE — 36000056 ZZH SURGERY LEVEL 3 1ST 30 MIN: Performed by: SURGERY

## 2019-01-01 PROCEDURE — 99204 OFFICE O/P NEW MOD 45 MIN: CPT | Performed by: SURGERY

## 2019-01-01 PROCEDURE — 80048 BASIC METABOLIC PNL TOTAL CA: CPT | Performed by: INTERNAL MEDICINE

## 2019-01-01 PROCEDURE — 25800030 ZZH RX IP 258 OP 636: Performed by: ANESTHESIOLOGY

## 2019-01-01 PROCEDURE — 74177 CT ABD & PELVIS W/CONTRAST: CPT

## 2019-01-01 PROCEDURE — 25000566 ZZH SEVOFLURANE, EA 15 MIN: Performed by: SURGERY

## 2019-01-01 PROCEDURE — 25000132 ZZH RX MED GY IP 250 OP 250 PS 637: Mod: GY | Performed by: STUDENT IN AN ORGANIZED HEALTH CARE EDUCATION/TRAINING PROGRAM

## 2019-01-01 PROCEDURE — 37000008 ZZH ANESTHESIA TECHNICAL FEE, 1ST 30 MIN: Performed by: SURGERY

## 2019-01-01 PROCEDURE — 99215 OFFICE O/P EST HI 40 MIN: CPT | Mod: 25 | Performed by: FAMILY MEDICINE

## 2019-01-01 PROCEDURE — 99217 ZZC OBSERVATION CARE DISCHARGE: CPT | Performed by: STUDENT IN AN ORGANIZED HEALTH CARE EDUCATION/TRAINING PROGRAM

## 2019-01-01 PROCEDURE — C1727 CATH, BAL TIS DIS, NON-VAS: HCPCS | Performed by: SURGERY

## 2019-01-01 PROCEDURE — G0378 HOSPITAL OBSERVATION PER HR: HCPCS

## 2019-01-01 PROCEDURE — 93000 ELECTROCARDIOGRAM COMPLETE: CPT | Performed by: FAMILY MEDICINE

## 2019-01-01 PROCEDURE — L0456 TLSO FLEX TRNK SJ-SS PRE CST: HCPCS

## 2019-01-01 PROCEDURE — 85027 COMPLETE CBC AUTOMATED: CPT | Performed by: INTERNAL MEDICINE

## 2019-01-01 PROCEDURE — 99215 OFFICE O/P EST HI 40 MIN: CPT | Performed by: FAMILY MEDICINE

## 2019-01-01 PROCEDURE — 49650 LAP ING HERNIA REPAIR INIT: CPT | Mod: AS | Performed by: PHYSICIAN ASSISTANT

## 2019-01-01 PROCEDURE — 99225 ZZC SUBSEQUENT OBSERVATION CARE,LEVEL II: CPT | Performed by: STUDENT IN AN ORGANIZED HEALTH CARE EDUCATION/TRAINING PROGRAM

## 2019-01-01 PROCEDURE — 72100 X-RAY EXAM L-S SPINE 2/3 VWS: CPT | Mod: FY

## 2019-01-01 PROCEDURE — 97530 THERAPEUTIC ACTIVITIES: CPT | Mod: GP | Performed by: PHYSICAL THERAPIST

## 2019-01-01 PROCEDURE — 25000132 ZZH RX MED GY IP 250 OP 250 PS 637: Mod: GY | Performed by: PHYSICIAN ASSISTANT

## 2019-01-01 PROCEDURE — C1781 MESH (IMPLANTABLE): HCPCS | Performed by: SURGERY

## 2019-01-01 PROCEDURE — 97116 GAIT TRAINING THERAPY: CPT | Mod: GP | Performed by: PHYSICAL THERAPIST

## 2019-01-01 PROCEDURE — 36000058 ZZH SURGERY LEVEL 3 EA 15 ADDTL MIN: Performed by: SURGERY

## 2019-01-01 PROCEDURE — G0008 ADMIN INFLUENZA VIRUS VAC: HCPCS | Performed by: FAMILY MEDICINE

## 2019-01-01 DEVICE — MESH PROGRIP LAPAROSCOPIC 5.9X3.9" PARIETEX SELF-FIX LPG1510: Type: IMPLANTABLE DEVICE | Site: GROIN | Status: FUNCTIONAL

## 2019-01-01 RX ORDER — LIDOCAINE 40 MG/G
CREAM TOPICAL
Status: DISCONTINUED | OUTPATIENT
Start: 2019-01-01 | End: 2019-01-01 | Stop reason: HOSPADM

## 2019-01-01 RX ORDER — CEFAZOLIN SODIUM 1 G/3ML
1 INJECTION, POWDER, FOR SOLUTION INTRAMUSCULAR; INTRAVENOUS SEE ADMIN INSTRUCTIONS
Status: DISCONTINUED | OUTPATIENT
Start: 2019-01-01 | End: 2019-01-01 | Stop reason: HOSPADM

## 2019-01-01 RX ORDER — AMOXICILLIN 250 MG
1-2 CAPSULE ORAL 2 TIMES DAILY
Qty: 15 TABLET | Refills: 0 | Status: ON HOLD | OUTPATIENT
Start: 2019-01-01 | End: 2019-01-01

## 2019-01-01 RX ORDER — HYDROMORPHONE HYDROCHLORIDE 1 MG/ML
.3-.5 INJECTION, SOLUTION INTRAMUSCULAR; INTRAVENOUS; SUBCUTANEOUS EVERY 5 MIN PRN
Status: DISCONTINUED | OUTPATIENT
Start: 2019-01-01 | End: 2019-01-01

## 2019-01-01 RX ORDER — OXYCODONE HYDROCHLORIDE 5 MG/1
5 TABLET ORAL 2 TIMES DAILY PRN
Start: 2019-01-01

## 2019-01-01 RX ORDER — ACETAMINOPHEN 650 MG/1
650 SUPPOSITORY RECTAL EVERY 4 HOURS PRN
Status: DISCONTINUED | OUTPATIENT
Start: 2019-01-01 | End: 2019-01-01 | Stop reason: HOSPADM

## 2019-01-01 RX ORDER — CYCLOBENZAPRINE HCL 5 MG
5 TABLET ORAL 2 TIMES DAILY PRN
Qty: 15 TABLET | Refills: 0 | Status: ON HOLD | OUTPATIENT
Start: 2019-01-01 | End: 2020-01-01

## 2019-01-01 RX ORDER — OXYCODONE HYDROCHLORIDE 5 MG/1
5-10 TABLET ORAL
Status: DISCONTINUED | OUTPATIENT
Start: 2019-01-01 | End: 2019-01-01 | Stop reason: HOSPADM

## 2019-01-01 RX ORDER — LIDOCAINE HYDROCHLORIDE 10 MG/ML
INJECTION, SOLUTION EPIDURAL; INFILTRATION; INTRACAUDAL; PERINEURAL
Status: DISCONTINUED
Start: 2019-01-01 | End: 2019-01-01 | Stop reason: HOSPADM

## 2019-01-01 RX ORDER — AZITHROMYCIN 250 MG/1
TABLET, FILM COATED ORAL
Qty: 6 TABLET | Refills: 0 | Status: SHIPPED | OUTPATIENT
Start: 2019-01-01 | End: 2019-01-01

## 2019-01-01 RX ORDER — LISINOPRIL AND HYDROCHLOROTHIAZIDE 20; 25 MG/1; MG/1
1 TABLET ORAL DAILY
Status: CANCELLED | OUTPATIENT
Start: 2019-01-01

## 2019-01-01 RX ORDER — OXYCODONE HYDROCHLORIDE 5 MG/1
5 TABLET ORAL
Status: DISCONTINUED | OUTPATIENT
Start: 2019-01-01 | End: 2019-01-01

## 2019-01-01 RX ORDER — OXYCODONE HYDROCHLORIDE 5 MG/1
5 TABLET ORAL 2 TIMES DAILY PRN
Qty: 14 TABLET | Refills: 0 | Status: ON HOLD | OUTPATIENT
Start: 2019-01-01 | End: 2020-01-01

## 2019-01-01 RX ORDER — LISINOPRIL AND HYDROCHLOROTHIAZIDE 20; 25 MG/1; MG/1
1 TABLET ORAL DAILY
Qty: 90 TABLET | Refills: 3 | Status: SHIPPED | OUTPATIENT
Start: 2019-01-01

## 2019-01-01 RX ORDER — BUPIVACAINE HYDROCHLORIDE AND EPINEPHRINE 5; 5 MG/ML; UG/ML
INJECTION, SOLUTION EPIDURAL; INTRACAUDAL; PERINEURAL
Status: DISCONTINUED
Start: 2019-01-01 | End: 2019-01-01 | Stop reason: HOSPADM

## 2019-01-01 RX ORDER — CEFAZOLIN SODIUM 2 G/100ML
2 INJECTION, SOLUTION INTRAVENOUS
Status: COMPLETED | OUTPATIENT
Start: 2019-01-01 | End: 2019-01-01

## 2019-01-01 RX ORDER — NALOXONE HYDROCHLORIDE 0.4 MG/ML
.1-.4 INJECTION, SOLUTION INTRAMUSCULAR; INTRAVENOUS; SUBCUTANEOUS
Status: DISCONTINUED | OUTPATIENT
Start: 2019-01-01 | End: 2019-01-01 | Stop reason: HOSPADM

## 2019-01-01 RX ORDER — FENTANYL CITRATE 50 UG/ML
25-50 INJECTION, SOLUTION INTRAMUSCULAR; INTRAVENOUS
Status: DISCONTINUED | OUTPATIENT
Start: 2019-01-01 | End: 2019-01-01

## 2019-01-01 RX ORDER — SODIUM CHLORIDE, SODIUM LACTATE, POTASSIUM CHLORIDE, CALCIUM CHLORIDE 600; 310; 30; 20 MG/100ML; MG/100ML; MG/100ML; MG/100ML
INJECTION, SOLUTION INTRAVENOUS CONTINUOUS
Status: DISCONTINUED | OUTPATIENT
Start: 2019-01-01 | End: 2019-01-01 | Stop reason: HOSPADM

## 2019-01-01 RX ORDER — PROPOFOL 10 MG/ML
INJECTION, EMULSION INTRAVENOUS PRN
Status: DISCONTINUED | OUTPATIENT
Start: 2019-01-01 | End: 2019-01-01

## 2019-01-01 RX ORDER — TAMSULOSIN HYDROCHLORIDE 0.4 MG/1
0.4 CAPSULE ORAL DAILY
Status: DISCONTINUED | OUTPATIENT
Start: 2019-01-01 | End: 2019-01-01 | Stop reason: HOSPADM

## 2019-01-01 RX ORDER — OXYCODONE HYDROCHLORIDE 5 MG/1
5 TABLET ORAL 2 TIMES DAILY PRN
Qty: 14 TABLET | Refills: 0 | Status: SHIPPED | OUTPATIENT
Start: 2019-01-01 | End: 2019-01-01

## 2019-01-01 RX ORDER — IBUPROFEN 600 MG/1
600 TABLET, FILM COATED ORAL EVERY 8 HOURS PRN
Qty: 30 TABLET | Refills: 0 | Status: SHIPPED | OUTPATIENT
Start: 2019-01-01 | End: 2020-01-01

## 2019-01-01 RX ORDER — ONDANSETRON 4 MG/1
4 TABLET, ORALLY DISINTEGRATING ORAL EVERY 30 MIN PRN
Status: DISCONTINUED | OUTPATIENT
Start: 2019-01-01 | End: 2019-01-01

## 2019-01-01 RX ORDER — ACETAMINOPHEN 325 MG/1
650 TABLET ORAL EVERY 4 HOURS PRN
Status: DISCONTINUED | OUTPATIENT
Start: 2019-01-01 | End: 2019-01-01 | Stop reason: HOSPADM

## 2019-01-01 RX ORDER — NALOXONE HYDROCHLORIDE 0.4 MG/ML
.1-.4 INJECTION, SOLUTION INTRAMUSCULAR; INTRAVENOUS; SUBCUTANEOUS
Status: DISCONTINUED | OUTPATIENT
Start: 2019-01-01 | End: 2019-01-01

## 2019-01-01 RX ORDER — OXYCODONE HYDROCHLORIDE 5 MG/1
5 TABLET ORAL EVERY 4 HOURS PRN
Qty: 20 TABLET | Refills: 0 | Status: SHIPPED | OUTPATIENT
Start: 2019-01-01 | End: 2019-01-01

## 2019-01-01 RX ORDER — ONDANSETRON 2 MG/ML
4 INJECTION INTRAMUSCULAR; INTRAVENOUS EVERY 30 MIN PRN
Status: DISCONTINUED | OUTPATIENT
Start: 2019-01-01 | End: 2019-01-01

## 2019-01-01 RX ORDER — IOPAMIDOL 755 MG/ML
67 INJECTION, SOLUTION INTRAVASCULAR ONCE
Status: COMPLETED | OUTPATIENT
Start: 2019-01-01 | End: 2019-01-01

## 2019-01-01 RX ORDER — ACETAMINOPHEN 325 MG/1
650 TABLET ORAL
Status: DISCONTINUED | OUTPATIENT
Start: 2019-01-01 | End: 2019-01-01 | Stop reason: HOSPADM

## 2019-01-01 RX ORDER — TAMSULOSIN HYDROCHLORIDE 0.4 MG/1
0.4 CAPSULE ORAL DAILY
Qty: 30 CAPSULE | Refills: 11 | Status: ON HOLD | OUTPATIENT
Start: 2019-01-01 | End: 2019-01-01

## 2019-01-01 RX ORDER — SODIUM CHLORIDE, SODIUM LACTATE, POTASSIUM CHLORIDE, CALCIUM CHLORIDE 600; 310; 30; 20 MG/100ML; MG/100ML; MG/100ML; MG/100ML
INJECTION, SOLUTION INTRAVENOUS CONTINUOUS
Status: DISCONTINUED | OUTPATIENT
Start: 2019-01-01 | End: 2019-01-01

## 2019-01-01 RX ORDER — SODIUM CHLORIDE, SODIUM LACTATE, POTASSIUM CHLORIDE, CALCIUM CHLORIDE 600; 310; 30; 20 MG/100ML; MG/100ML; MG/100ML; MG/100ML
INJECTION, SOLUTION INTRAVENOUS CONTINUOUS PRN
Status: DISCONTINUED | OUTPATIENT
Start: 2019-01-01 | End: 2019-01-01

## 2019-01-01 RX ORDER — POLYETHYLENE GLYCOL 3350 17 G/17G
1 POWDER, FOR SOLUTION ORAL DAILY
COMMUNITY

## 2019-01-01 RX ORDER — IBUPROFEN 600 MG/1
600 TABLET, FILM COATED ORAL EVERY 8 HOURS PRN
Qty: 30 TABLET | Refills: 0 | Status: SHIPPED | OUTPATIENT
Start: 2019-01-01 | End: 2019-01-01

## 2019-01-01 RX ORDER — LISINOPRIL AND HYDROCHLOROTHIAZIDE 20; 25 MG/1; MG/1
1 TABLET ORAL DAILY
Status: DISCONTINUED | OUTPATIENT
Start: 2019-01-01 | End: 2019-01-01 | Stop reason: HOSPADM

## 2019-01-01 RX ORDER — ACETAMINOPHEN 325 MG/1
325-650 TABLET ORAL EVERY 6 HOURS PRN
Status: ON HOLD | COMMUNITY
Start: 2019-01-01 | End: 2020-01-01

## 2019-01-01 RX ORDER — HYDROMORPHONE HYDROCHLORIDE 1 MG/ML
0.2 INJECTION, SOLUTION INTRAMUSCULAR; INTRAVENOUS; SUBCUTANEOUS
Status: DISCONTINUED | OUTPATIENT
Start: 2019-01-01 | End: 2019-01-01 | Stop reason: HOSPADM

## 2019-01-01 RX ORDER — HYDROMORPHONE HYDROCHLORIDE 1 MG/ML
.3-.5 INJECTION, SOLUTION INTRAMUSCULAR; INTRAVENOUS; SUBCUTANEOUS EVERY 10 MIN PRN
Status: DISCONTINUED | OUTPATIENT
Start: 2019-01-01 | End: 2019-01-01

## 2019-01-01 RX ORDER — CYCLOBENZAPRINE HCL 5 MG
5 TABLET ORAL 2 TIMES DAILY PRN
Qty: 10 TABLET | Refills: 0 | Status: ON HOLD | OUTPATIENT
Start: 2019-01-01 | End: 2019-01-01

## 2019-01-01 RX ORDER — NEOSTIGMINE METHYLSULFATE 1 MG/ML
VIAL (ML) INJECTION PRN
Status: DISCONTINUED | OUTPATIENT
Start: 2019-01-01 | End: 2019-01-01

## 2019-01-01 RX ORDER — FENTANYL CITRATE 50 UG/ML
INJECTION, SOLUTION INTRAMUSCULAR; INTRAVENOUS PRN
Status: DISCONTINUED | OUTPATIENT
Start: 2019-01-01 | End: 2019-01-01

## 2019-01-01 RX ORDER — GLYCOPYRROLATE 0.2 MG/ML
INJECTION, SOLUTION INTRAMUSCULAR; INTRAVENOUS PRN
Status: DISCONTINUED | OUTPATIENT
Start: 2019-01-01 | End: 2019-01-01

## 2019-01-01 RX ORDER — HYDROCODONE BITARTRATE AND ACETAMINOPHEN 5; 325 MG/1; MG/1
1 TABLET ORAL EVERY 4 HOURS PRN
Qty: 15 TABLET | Refills: 0 | Status: SHIPPED | OUTPATIENT
Start: 2019-01-01 | End: 2019-01-01

## 2019-01-01 RX ORDER — MEPERIDINE HYDROCHLORIDE 25 MG/ML
12.5 INJECTION INTRAMUSCULAR; INTRAVENOUS; SUBCUTANEOUS
Status: DISCONTINUED | OUTPATIENT
Start: 2019-01-01 | End: 2019-01-01

## 2019-01-01 RX ORDER — HYDROCODONE BITARTRATE AND ACETAMINOPHEN 5; 325 MG/1; MG/1
1 TABLET ORAL EVERY 4 HOURS PRN
Status: DISCONTINUED | OUTPATIENT
Start: 2019-01-01 | End: 2019-01-01 | Stop reason: HOSPADM

## 2019-01-01 RX ORDER — AMOXICILLIN 250 MG
1 CAPSULE ORAL 2 TIMES DAILY PRN
Status: DISCONTINUED | OUTPATIENT
Start: 2019-01-01 | End: 2019-01-01 | Stop reason: HOSPADM

## 2019-01-01 RX ORDER — DEXAMETHASONE SODIUM PHOSPHATE 4 MG/ML
INJECTION, SOLUTION INTRA-ARTICULAR; INTRALESIONAL; INTRAMUSCULAR; INTRAVENOUS; SOFT TISSUE PRN
Status: DISCONTINUED | OUTPATIENT
Start: 2019-01-01 | End: 2019-01-01

## 2019-01-01 RX ORDER — EPHEDRINE SULFATE 50 MG/ML
INJECTION, SOLUTION INTRAMUSCULAR; INTRAVENOUS; SUBCUTANEOUS PRN
Status: DISCONTINUED | OUTPATIENT
Start: 2019-01-01 | End: 2019-01-01

## 2019-01-01 RX ORDER — TAMSULOSIN HYDROCHLORIDE 0.4 MG/1
0.4 CAPSULE ORAL DAILY
Qty: 30 CAPSULE | Refills: 0 | Status: SHIPPED | OUTPATIENT
Start: 2019-01-01

## 2019-01-01 RX ORDER — ONDANSETRON 4 MG/1
4 TABLET, ORALLY DISINTEGRATING ORAL EVERY 6 HOURS PRN
Status: DISCONTINUED | OUTPATIENT
Start: 2019-01-01 | End: 2019-01-01 | Stop reason: HOSPADM

## 2019-01-01 RX ORDER — ONDANSETRON 2 MG/ML
4 INJECTION INTRAMUSCULAR; INTRAVENOUS EVERY 6 HOURS PRN
Status: DISCONTINUED | OUTPATIENT
Start: 2019-01-01 | End: 2019-01-01 | Stop reason: HOSPADM

## 2019-01-01 RX ORDER — CYCLOBENZAPRINE HCL 5 MG
5 TABLET ORAL 2 TIMES DAILY PRN
Status: DISCONTINUED | OUTPATIENT
Start: 2019-01-01 | End: 2019-01-01 | Stop reason: HOSPADM

## 2019-01-01 RX ORDER — AMOXICILLIN 250 MG
2 CAPSULE ORAL 2 TIMES DAILY PRN
Status: DISCONTINUED | OUTPATIENT
Start: 2019-01-01 | End: 2019-01-01 | Stop reason: HOSPADM

## 2019-01-01 RX ORDER — ONDANSETRON 2 MG/ML
INJECTION INTRAMUSCULAR; INTRAVENOUS PRN
Status: DISCONTINUED | OUTPATIENT
Start: 2019-01-01 | End: 2019-01-01

## 2019-01-01 RX ORDER — LIDOCAINE HYDROCHLORIDE 20 MG/ML
INJECTION, SOLUTION INFILTRATION; PERINEURAL PRN
Status: DISCONTINUED | OUTPATIENT
Start: 2019-01-01 | End: 2019-01-01

## 2019-01-01 RX ORDER — OXYCODONE HYDROCHLORIDE 5 MG/1
5 TABLET ORAL EVERY 6 HOURS PRN
Qty: 30 TABLET | Refills: 0 | Status: SHIPPED | OUTPATIENT
Start: 2019-01-01 | End: 2019-01-01

## 2019-01-01 RX ADMIN — ACETAMINOPHEN 650 MG: 325 TABLET, FILM COATED ORAL at 14:37

## 2019-01-01 RX ADMIN — FENTANYL CITRATE 50 MCG: 50 INJECTION, SOLUTION INTRAMUSCULAR; INTRAVENOUS at 12:27

## 2019-01-01 RX ADMIN — FENTANYL CITRATE 50 MCG: 50 INJECTION, SOLUTION INTRAMUSCULAR; INTRAVENOUS at 11:59

## 2019-01-01 RX ADMIN — OXYCODONE HYDROCHLORIDE 5 MG: 5 TABLET ORAL at 07:48

## 2019-01-01 RX ADMIN — NEOSTIGMINE METHYLSULFATE 3 MG: 1 INJECTION, SOLUTION INTRAVENOUS at 13:05

## 2019-01-01 RX ADMIN — IOPAMIDOL 67 ML: 755 INJECTION, SOLUTION INTRAVENOUS at 11:44

## 2019-01-01 RX ADMIN — ROCURONIUM BROMIDE 35 MG: 10 INJECTION INTRAVENOUS at 11:59

## 2019-01-01 RX ADMIN — PROPOFOL 100 MG: 10 INJECTION, EMULSION INTRAVENOUS at 11:59

## 2019-01-01 RX ADMIN — CYCLOBENZAPRINE HYDROCHLORIDE 5 MG: 5 TABLET, FILM COATED ORAL at 04:19

## 2019-01-01 RX ADMIN — SODIUM CHLORIDE, POTASSIUM CHLORIDE, SODIUM LACTATE AND CALCIUM CHLORIDE: 600; 310; 30; 20 INJECTION, SOLUTION INTRAVENOUS at 06:04

## 2019-01-01 RX ADMIN — LIDOCAINE HYDROCHLORIDE 100 MG: 20 INJECTION, SOLUTION INFILTRATION; PERINEURAL at 11:59

## 2019-01-01 RX ADMIN — Medication 5 MG: at 12:21

## 2019-01-01 RX ADMIN — LISINOPRIL AND HYDROCHLOROTHIAZIDE 1 TABLET: 25; 20 TABLET ORAL at 07:48

## 2019-01-01 RX ADMIN — FENTANYL CITRATE 25 MCG: 0.05 INJECTION, SOLUTION INTRAMUSCULAR; INTRAVENOUS at 14:11

## 2019-01-01 RX ADMIN — TAMSULOSIN HYDROCHLORIDE 0.4 MG: 0.4 CAPSULE ORAL at 10:51

## 2019-01-01 RX ADMIN — ONDANSETRON 4 MG: 2 INJECTION INTRAMUSCULAR; INTRAVENOUS at 13:00

## 2019-01-01 RX ADMIN — CEFAZOLIN SODIUM 2 G: 2 INJECTION, SOLUTION INTRAVENOUS at 12:08

## 2019-01-01 RX ADMIN — GLYCOPYRROLATE 0.5 MG: 0.2 INJECTION, SOLUTION INTRAMUSCULAR; INTRAVENOUS at 13:05

## 2019-01-01 RX ADMIN — PHENYLEPHRINE HYDROCHLORIDE 200 MCG: 10 INJECTION INTRAVENOUS at 12:11

## 2019-01-01 RX ADMIN — SODIUM CHLORIDE 59 ML: 9 INJECTION, SOLUTION INTRAVENOUS at 11:44

## 2019-01-01 RX ADMIN — SODIUM CHLORIDE, POTASSIUM CHLORIDE, SODIUM LACTATE AND CALCIUM CHLORIDE: 600; 310; 30; 20 INJECTION, SOLUTION INTRAVENOUS at 11:54

## 2019-01-01 RX ADMIN — SODIUM CHLORIDE, POTASSIUM CHLORIDE, SODIUM LACTATE AND CALCIUM CHLORIDE: 600; 310; 30; 20 INJECTION, SOLUTION INTRAVENOUS at 12:59

## 2019-01-01 RX ADMIN — Medication 1 MG: at 00:50

## 2019-01-01 RX ADMIN — OXYCODONE HYDROCHLORIDE 5 MG: 5 TABLET ORAL at 20:25

## 2019-01-01 RX ADMIN — ROCURONIUM BROMIDE 15 MG: 10 INJECTION INTRAVENOUS at 12:10

## 2019-01-01 RX ADMIN — DEXAMETHASONE SODIUM PHOSPHATE 4 MG: 4 INJECTION, SOLUTION INTRA-ARTICULAR; INTRALESIONAL; INTRAMUSCULAR; INTRAVENOUS; SOFT TISSUE at 12:24

## 2019-01-01 RX ADMIN — Medication 5 MG: at 12:11

## 2019-01-01 RX ADMIN — PHENYLEPHRINE HYDROCHLORIDE 150 MCG: 10 INJECTION INTRAVENOUS at 12:06

## 2019-01-01 RX ADMIN — SODIUM CHLORIDE, POTASSIUM CHLORIDE, SODIUM LACTATE AND CALCIUM CHLORIDE: 600; 310; 30; 20 INJECTION, SOLUTION INTRAVENOUS at 20:25

## 2019-01-01 RX ADMIN — LISINOPRIL AND HYDROCHLOROTHIAZIDE 1 TABLET: 25; 20 TABLET ORAL at 08:36

## 2019-01-01 RX ADMIN — PHENYLEPHRINE HYDROCHLORIDE 200 MCG: 10 INJECTION INTRAVENOUS at 12:21

## 2019-01-01 RX ADMIN — FENTANYL CITRATE 25 MCG: 0.05 INJECTION, SOLUTION INTRAMUSCULAR; INTRAVENOUS at 14:24

## 2019-01-01 ASSESSMENT — MIFFLIN-ST. JEOR
SCORE: 1218.31
SCORE: 1297.68
SCORE: 1237.36
SCORE: 1289.51
SCORE: 1438.29

## 2019-01-01 ASSESSMENT — COLUMBIA-SUICIDE SEVERITY RATING SCALE - C-SSRS
2. HAVE YOU ACTUALLY HAD ANY THOUGHTS OF KILLING YOURSELF IN THE PAST MONTH?: NO
1. IN THE PAST MONTH, HAVE YOU WISHED YOU WERE DEAD OR WISHED YOU COULD GO TO SLEEP AND NOT WAKE UP?: NO

## 2019-01-01 ASSESSMENT — PATIENT HEALTH QUESTIONNAIRE - PHQ9: SUM OF ALL RESPONSES TO PHQ QUESTIONS 1-9: 0

## 2019-01-01 ASSESSMENT — COPD QUESTIONNAIRES: COPD: 0

## 2019-01-01 ASSESSMENT — LIFESTYLE VARIABLES: TOBACCO_USE: 0

## 2019-02-17 DIAGNOSIS — I10 ESSENTIAL HYPERTENSION WITH GOAL BLOOD PRESSURE LESS THAN 140/90: ICD-10-CM

## 2019-02-18 RX ORDER — LISINOPRIL AND HYDROCHLOROTHIAZIDE 20; 25 MG/1; MG/1
TABLET ORAL
Qty: 30 TABLET | Refills: 0 | Status: SHIPPED | OUTPATIENT
Start: 2019-02-18 | End: 2019-01-01

## 2019-02-18 NOTE — TELEPHONE ENCOUNTER
"Medication is being filled for 1 time refill only due to:  Patient needs to be seen because it has been more than one year since last visit.   Due for OV and labs  Last seen for HTN 1/29/18  Bernarda Taylor RN    Requested Prescriptions   Signed Prescriptions Disp Refills     lisinopril-hydrochlorothiazide (PRINZIDE/ZESTORETIC) 20-25 MG tablet 30 tablet 0     Sig: TAKE ONE TABLET BY MOUTH ONE TIME DAILY    Diuretics (Including Combos) Protocol Failed - 2/17/2019  7:00 AM       Failed - Normal serum creatinine on file in past 12 months    Recent Labs   Lab Test 01/29/18  1114   CR 1.12             Failed - Normal serum potassium on file in past 12 months    Recent Labs   Lab Test 01/29/18  1114   POTASSIUM 4.3                   Failed - Normal serum sodium on file in past 12 months    Recent Labs   Lab Test 01/29/18  1114                Passed - Blood pressure under 140/90 in past 12 months    BP Readings from Last 3 Encounters:   03/23/18 112/66   03/02/18 124/74   01/29/18 122/76                Passed - Recent (12 mo) or future (30 days) visit within the authorizing provider's specialty    Patient had office visit in the last 12 months or has a visit in the next 30 days with authorizing provider or within the authorizing provider's specialty.  See \"Patient Info\" tab in inbasket, or \"Choose Columns\" in Meds & Orders section of the refill encounter.             Passed - Medication is active on med list       Passed - Patient is age 18 or older            "

## 2019-03-21 NOTE — PROGRESS NOTES
"  SUBJECTIVE:   Bhavesh Schafer is a 87 year old male who presents to clinic today for the following health issues:      Hypertension Follow-up      Outpatient blood pressures are not being checked.    Low Salt Diet: not monitoring salt      Amount of exercise or physical activity: 2-3 days/week for an average of 15-30 minutes    Problems taking medications regularly: No    Medication side effects: none    Diet: regular (no restrictions)    Also has been coughing, feeling fatigued  \"unable to work\"    Ill contacts: no others at home with similar illness  Denies These symptoms(Neg ROS): fever, Nausea/Vomiting/Diarrhea,   Review of symptoms except as noted in the HPI is otherwise negative.    MEDICATIONS  Current Outpatient Medications   Medication Sig Dispense Refill     lisinopril-hydrochlorothiazide (PRINZIDE/ZESTORETIC) 20-25 MG tablet TAKE ONE TABLET BY MOUTH ONE TIME DAILY  30 tablet 0     Magnesium 250 MG tablet Take 1 tablet by mouth daily. 100 tablet 3     saw palmetto ([employer] SAW PALMETTO) 160 MG CAPS Take 3 capsules (480 mg) by mouth daily       VITAMIN C 2000 MG OR TABS 1 TABLET DAILY 30 0     Allergies:    Allergies   Allergen Reactions     Atenolol Swelling     Fast pulse. Patient does not remember getting this medication.     No Known Allergies        SOCIAL   reports that he quit smoking about 40 years ago. he has never used smokeless tobacco.  OBJECTIVE:  BP 97/59   Pulse 92   Temp 98.3  F (36.8  C) (Oral)   Wt 62.4 kg (137 lb 9.6 oz)   SpO2 97%   BMI 19.74 kg/m    General appearance: healthy, alert and cooperative.  Nose: no rhinorrhea  Sinuses: normal; sinuses nontender  Oropharynx: mild erythema  Neck: few small nontender anterior cervical nodes  Cv: regular rate and rythym  Lungs: bilateral rales      ASSESSMENT/PLAN:    ICD-10-CM    1. Essential hypertension with goal blood pressure less than 140/90 I10 lisinopril-hydrochlorothiazide (PRINZIDE/ZESTORETIC) 20-25 MG tablet     Renal panel (Alb, " BUN, Ca, Cl, CO2, Creat, Gluc, Phos, K, Na)   2. Acute bronchitis with symptoms > 10 days J20.9 azithromycin (ZITHROMAX) 250 MG tablet     Because of where his blood pressure has been lately infection with a little low I was discussing with him the idea of cutting his pill in half  He has been taking the same dose for a long time so he said he at    He has been quite ill recently and abnormal lung exam suggests exacerbation of bronchitis  Last time antibiotics fixed him right up    Instructions on use of OTC medications given  Call or return to clinic if these symptoms worsen or fail to improve as anticipated.    Eugene Maki MD MPH

## 2019-03-21 NOTE — NURSING NOTE
"Chief Complaint   Patient presents with     Hypertension     BP 97/59   Pulse 92   Temp 98.3  F (36.8  C) (Oral)   Wt 62.4 kg (137 lb 9.6 oz)   SpO2 97%   BMI 19.74 kg/m   Estimated body mass index is 19.74 kg/m  as calculated from the following:    Height as of 3/2/18: 1.778 m (5' 10\").    Weight as of this encounter: 62.4 kg (137 lb 9.6 oz).  Medication Reconciliation: complete      Health Maintenance that is potentially due pending provider review:  NONE    n/a    NORM Gooden  "

## 2019-03-21 NOTE — NURSING NOTE
"Chief Complaint   Patient presents with     Hypertension     BP 97/59   Pulse 92   Temp 98.3  F (36.8  C) (Oral)   Wt 62.4 kg (137 lb 9.6 oz)   SpO2 97%   BMI 19.74 kg/m   Estimated body mass index is 19.74 kg/m  as calculated from the following:    Height as of 3/2/18: 1.778 m (5' 10\").    Weight as of this encounter: 62.4 kg (137 lb 9.6 oz).  Medication Reconciliation: complete      Health Maintenance that is potentially due pending provider review:  PHQ9    Possibly completing today per provider review.    NORM Gooden  "

## 2019-06-20 NOTE — RESULT ENCOUNTER NOTE
Could you please notify this patient of his/her results?  Very small hernia, would not recommend surgery.  Thanks

## 2019-06-24 NOTE — TELEPHONE ENCOUNTER
Called patient to inform him that he has a very small hernia.  Nothing incarcerated.    Dr. Garcia does not recommend surgery.    Patient should call if symptoms persist or become worse.    Patient in agreement.    Blanca Deras RN-BSN

## 2019-09-10 PROBLEM — K40.90 LEFT INGUINAL HERNIA: Status: ACTIVE | Noted: 2019-01-01

## 2019-09-10 NOTE — LETTER
[facility]    [address], [address]  [place], [place] [address]  Phone [phone]  Fax [phone] [address], [address]  [place] [address]  Phone [phone]  Fax [phone]    [email or web address]     September 10, 2019    Re: Bhavesh GIDEON Schafer  : 1931      Surgery follow-up note     Bhaveshman Schafer presents today for further discussion.  I saw this gentleman 3 months ago when he presented with a small left inguinal hernia.  This hernia was unable to be appreciated on physical exam but was causing the patient some discomfort.  We obtained a CT scan which showed a possible small fat-containing hernia on the left.  Patient has a history of previous laparoscopic right inguinal hernia approximately 4 years ago.  Given the small size of the hernia and his lack of symptoms, I did not recommend repair at that time.     Patient now returns with increased size and discomfort of the left inguinal hernia.  He is noticed that the hernia is almost always out except when he wakes up in the morning.  He has occasional discomfort and the area causes him anxiety.     On physical exam, patient has a definite 3 x 4 cm area of bulging in the left groin consistent with inguinal hernia.  No surrounding surgical scar.  Bulge is reducible but immediately pops back out.     Assessment and plan: This is an 88-year-old gentleman with a history of previous laparoscopic right inguinal hernia repair who now presents with progressive left inguinal hernia.  Given the increase in size over a relatively short period of time and the patient's newly develop symptoms, I have recommended repair of the left inguinal hernia.  Patient is very interested in another minimally invasive surgery and we will try a laparoscopic left inguinal hernia.  If this proves to be too difficult due to his previous laparoscopic right inguinal hernia repair, I would  proceed with open.  Patient also has a history of urinary retention after his last surgery and I will likely discharge him with Flomax.  He will undergo preoperative history and physical and I will get him scheduled for surgery at his convenience.     Jan Garcia M.D.  [facility]  [phone]

## 2019-09-10 NOTE — TELEPHONE ENCOUNTER
Type of surgery: Laparoscopic left inguinal hernia repair, possible open  Location of surgery: [facility]  Date and time of surgery: 10/11/19 at 1:30pm  Surgeon: Dr. Tariq Garcia  Pre-Op Appt Date: Patient to schedule  Post-Op Appt Date: Patient to schedule   Packet sent out: Yes  Pre-cert/Authorization completed:  Not Applicable  Date: 9/10/19

## 2019-09-10 NOTE — PROGRESS NOTES
Surgery follow-up note    Bhavesh Schafer presents today for further discussion.  I saw this gentleman 3 months ago when he presented with a small left inguinal hernia.  This hernia was unable to be appreciated on physical exam but was causing the patient some discomfort.  We obtained a CT scan which showed a possible small fat-containing hernia on the left.  Patient has a history of previous laparoscopic right inguinal hernia approximately 4 years ago.  Given the small size of the hernia and his lack of symptoms, I did not recommend repair at that time.    Patient now returns with increased size and discomfort of the left inguinal hernia.  He is noticed that the hernia is almost always out except when he wakes up in the morning.  He has occasional discomfort and the area causes him anxiety.    On physical exam, patient has a definite 3 x 4 cm area of bulging in the left groin consistent with inguinal hernia.  No surrounding surgical scar.  Bulge is reducible but immediately pops back out.    Assessment and plan: This is an 88-year-old gentleman with a history of previous laparoscopic right inguinal hernia repair who now presents with progressive left inguinal hernia.  Given the increase in size over a relatively short period of time and the patient's newly develop symptoms, I have recommended repair of the left inguinal hernia.  Patient is very interested in another minimally invasive surgery and we will try a laparoscopic left inguinal hernia.  If this proves to be too difficult due to his previous laparoscopic right inguinal hernia repair, I would proceed with open.  Patient also has a history of urinary retention after his last surgery and I will likely discharge him with Flomax.  He will undergo preoperative history and physical and I will get him scheduled for surgery at his convenience.    Jan Garcia M.D.  [facility]  [phone]    Please route or send letter to:  Primary Care Provider (PCP)  and Referring Provider

## 2019-09-26 NOTE — PROGRESS NOTES
[facility]  [address]  [facility] [address]  [phone]  Dept: [phone]    PRE-OP EVALUATION:  Today's date: 2019    Bhavesh Schafer (: 1931) presents for pre-operative evaluation assessment as requested by Dr. Garcia.  He requires evaluation and anesthesia risk assessment prior to undergoing surgery/procedure for treatment of  Hernia  .    Fax number for surgical facility: [facility]  Primary Physician: Eugene Maki  Type of Anesthesia Anticipated: General or Local    Patient has a Health Care Directive or Living Will:  YES     Preop Questions 2019   Who is doing your surgery? raj   What are you having done? hernia surgery   Date of Surgery/Procedure: oct 11   Facility or Hospital where procedure/surgery will be performed: [facility]   1.  Do you have a history of Heart attack, stroke, stent, coronary bypass surgery, or other heart surgery? No   2.  Do you ever have any pain or discomfort in your chest? No   3.  Do you have a history of  Heart Failure? No   4.   Are you troubled by shortness of breath when:  walking on a level surface, or up a slight hill, or at night? No   5.  Do you currently have a cold, bronchitis or other respiratory infection? No   6.  Do you have a cough, shortness of breath, or wheezing? No   7.  Do you sometimes get pains in the calves of your legs when you walk? No   8. Do you or anyone in your family have previous history of blood clots? No   9.  Do you or does anyone in your family have a serious bleeding problem such as prolonged bleeding following surgeries or cuts? No   10. Have you ever had problems with anemia or been told to take iron pills? No   11. Have you had any abnormal blood loss such as black, tarry or bloody stools? No   12. Have you ever had a blood transfusion? No   13. Have you or any of your relatives ever had problems with anesthesia? No   14. Do you have sleep apnea, excessive snoring or daytime  drowsiness? No   15. Do you have any prosthetic heart valves? No   16. Do you have prosthetic joints? No         HPI:     HPI related to upcoming procedure: This is his 4th hernia operation, and the hernia is now bothering him quite a bit    He has had a trouble with urinary retention     ANEMIA - Patient has a recent history of mild anemia, which has not been symptomatic    Results for orders placed or performed in visit on 09/26/19   Basic metabolic panel   Result Value Ref Range    Sodium 136 133 - 144 mmol/L    Potassium 4.5 3.4 - 5.3 mmol/L    Chloride 102 94 - 109 mmol/L    Carbon Dioxide 24 20 - 32 mmol/L    Anion Gap 10 3 - 14 mmol/L    Glucose 84 70 - 99 mg/dL    Urea Nitrogen 34 (H) 7 - 30 mg/dL    Creatinine 1.01 0.66 - 1.25 mg/dL    GFR Estimate 66 >60 mL/min/[1.73_m2]    GFR Estimate If Black 76 >60 mL/min/[1.73_m2]    Calcium 9.1 8.5 - 10.1 mg/dL   Hemoglobin   Result Value Ref Range    Hemoglobin 12.6 (L) 13.3 - 17.7 g/dL       HYPERTENSION - Patient has longstanding history of HTN , currently denies any symptoms referable to elevated blood pressure. Specifically denies chest pain, palpitations, dyspnea, orthopnea, PND or peripheral edema. Blood pressure readings have been in normal range. Current medication regimen is as listed below. Patient denies any side effects of medication.     BP Readings from Last 3 Encounters:   09/26/19 138/71   09/10/19 104/58   06/13/19 124/78       MEDICAL HISTORY:     Patient Active Problem List    Diagnosis Date Noted     Left inguinal hernia 09/10/2019     Priority: Medium     Other closed nondisplaced fracture of proximal end of left humerus, initial encounter 01/29/2018     Priority: Medium     History of depression 02/20/2014     Priority: Medium     Acute urinary retention 07/22/2013     Priority: Medium     Advanced directives, counseling/discussion 02/05/2013     Priority: Medium     Right inguinal hernia 02/05/2013     Priority: Medium     Iron deficiency  anemia 08/29/2011     Priority: Medium     Hypertension goal BP (blood pressure) < 140/90 01/18/2011     Priority: Medium     HYPERLIPIDEMIA LDL GOAL <130 10/31/2010     Priority: Medium     Persistent insomnia 02/11/2010     Priority: Medium     Patient is followed by Eugene Maki for ongoing prescription of benzodiazepines.  All refills should be approved by this provider, or covering partner.    Medication(s): Ambien 5 mg.   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 Provider to review       Macular degeneration (senile) of retina 08/24/2004     Priority: Medium     bilat  Problem list name updated by automated process. Provider to review        Past Medical History:   Diagnosis Date     Essential hypertension, benign      Inguinal hernia without mention of obstruction or gangrene, unilateral or unspecified, (not specified as recurrent) 6/2013    RIght     Macular degeneration (senile) of retina, unspecified     bilat     Pure hypercholesterolemia      Retinal neovascularization NOS     L     Past Surgical History:   Procedure Laterality Date     HC REMOVE TONSILS/ADENOIDS,<13 Y/O  age 8     HC REPAIR ING HERNIA 5+ YO,BALTAZAR  8/04    L, recurrent with small bowel incarceration     HC REPAIR ING HERNIA,5+Y/O,REDUCIBL      L     HC TOOTH EXTRACTION W/FORCEP       LAPAROSCOPIC HERNIORRHAPHY INGUINAL  7/12/2013    Procedure: LAPAROSCOPIC HERNIORRHAPHY INGUINAL;  Laparoscopic Right Inguinal Hernia Repair with Mesh ;  Surgeon: Martin Quinteros MD;  Location: [facility]     LASER SURGERY OF EYE  8/04    L retina laser therapy for neovascular membrane     Current Outpatient Medications  "  Medication Sig Dispense Refill     lisinopril-hydrochlorothiazide (PRINZIDE/ZESTORETIC) 20-25 MG tablet Take 1 tablet by mouth daily 90 tablet 3     tamsulosin (FLOMAX) 0.4 MG capsule Take 1 capsule (0.4 mg) by mouth daily 30 capsule 11     OTC products: none    Allergies   Allergen Reactions     Atenolol Swelling     Fast pulse. Patient does not remember getting this medication.     No Known Allergies       Latex Allergy: NO    Social History     Tobacco Use     Smoking status: Former Smoker     Last attempt to quit: 3/5/1979     Years since quittin.6     Smokeless tobacco: Never Used   Substance Use Topics     Alcohol use: No     Comment: rare red wine     History   Drug Use No       REVIEW OF SYSTEMS:   CONSTITUTIONAL: NEGATIVE for fever, chills, change in weight  INTEGUMENTARY/SKIN: NEGATIVE for worrisome rashes, moles or lesions  EYES: NEGATIVE for vision changes or irritation  ENT/MOUTH: NEGATIVE for ear, mouth and throat problems  RESP: NEGATIVE for significant cough or SOB  BREAST: NEGATIVE for masses, tenderness or discharge  CV: NEGATIVE for chest pain, palpitations or peripheral edema  GI: NEGATIVE for nausea, abdominal pain, heartburn, or change in bowel habits   male :decreased urinary stream  MUSCULOSKELETAL: NEGATIVE for significant arthralgias or myalgia  NEURO: NEGATIVE for weakness, dizziness or paresthesias  ENDOCRINE: NEGATIVE for temperature intolerance, skin/hair changes  HEME: NEGATIVE for bleeding problems  PSYCHIATRIC: NEGATIVE for changes in mood or affect    EXAM:   /71   Pulse 76   Temp 96.9  F (36.1  C) (Oral)   Ht 1.778 m (5' 10\")   Wt 61.3 kg (135 lb 3.2 oz)   SpO2 98%   BMI 19.40 kg/m      GENERAL APPEARANCE: healthy, alert and no distress     EYES: EOMI,  PERRL     HENT: ear canals and TM's normal and nose and mouth without ulcers or lesions     NECK: no adenopathy, no asymmetry, masses, or scars and thyroid normal to palpation     RESP: lungs clear to " auscultation - no rales, rhonchi or wheezes     CV: regular rates and rhythm, normal S1 S2, no S3 or S4 and no murmur, click or rub     ABDOMEN:  soft, nontender, no HSM or masses and bowel sounds normal     MS: extremities normal- no gross deformities noted, no evidence of inflammation in joints, FROM in all extremities.     SKIN: no suspicious lesions or rashes     NEURO: Normal strength and tone, sensory exam grossly normal, mentation intact and speech normal     PSYCH: mentation appears normal. 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      IMPRESSION:   Reason for surgery/procedure: hernia  Diagnosis/reason for consult: HTN, anemia, age, urinary retention    The proposed surgical procedure is considered INTERMEDIATE risk.    REVISED CARDIAC RISK INDEX  The patient has the following serious cardiovascular risks for perioperative complications such as (MI, PE, VFib and 3  AV Block):  No serious cardiac risks  INTERPRETATION: 0 risks: Class I (very low risk - 0.4% complication rate)    The patient has the following additional risks for perioperative complications:  No identified additional risks      ICD-10-CM    1. Preop general physical exam Z01.818 Basic metabolic panel     Hemoglobin     EKG 12-lead complete w/read - Clinics   2. Need for vaccination Z23 FLU VACCINE, INCREASED ANTIGEN, PRESV FREE, AGE 65+ [07576]   3. Unilateral recurrent inguinal hernia  without obstruction or gangrene K40.91    4. Urinary retention R33.9 tamsulosin (FLOMAX) 0.4 MG capsule   5. Hypertension goal BP (blood pressure) < 140/90 I10 Basic metabolic panel   6. Other iron deficiency anemia D50.8 Hemoglobin       RECOMMENDATIONS:       Cardiovascular Risk  Performs 4 METs exercise without symptoms (Walk on level ground at 15 minutes per mile (4 miles/hour)) .       Pulmonary Risk  Incentive spirometry post op  Respiratory Therapy (Respiratory Care IP Consult)  post op  NG tube decompression if abdominal distension or significant vomiting       Anemia  Mild, No conerns    --Patient is to take all scheduled medications on the day of surgery EXCEPT for modifications listed below.    ACE Inhibitor or Angiotensin Receptor Blocker (ARB) Use  Ace inhibitor or Angiotensin Receptor Blocker (ARB) and should HOLD this medication for the 24 hours prior to surgery.    We will start flomax to hopefully help with urinary retention which was a problem after his last surgery      APPROVAL GIVEN to proceed with proposed procedure, without further diagnostic evaluation       Signed Electronically by: Eugene Maki MD    Copy of this evaluation report is provided to requesting physician.    [facility] Preop Guidelines    Revised Cardiac Risk Index

## 2019-10-01 NOTE — TELEPHONE ENCOUNTER
Patient is scheduled for a laparoscopic/possible open left inguinal hernia repair with mesh on 10/11/19 with Dr. Garcia.    He had is pre op recently and his pcp prescribed tamsulosin for him d/t history of urinary retention, and need for fitch placement after surgery.    He is wanting to verify it is ok to take this medication.  Informed him that yes, this medication is okay to take.  He is also wondering if he should take his blood pressure medications the day of surgery.    Advised him to take his blood pressure medications in the morning with sips of water.    He verbalized understanding and will call PRN.    Blanca Deras, GIOVANNI-BSN

## 2019-10-11 NOTE — OR NURSING
Patient feels the need to urinate.  Unable to use urinal. Patient up to bathroom to attempt to void.

## 2019-10-11 NOTE — DISCHARGE SUMMARY
[facility] Discharge Summary    Bhavesh Schafer MRN# [id number]   Age: 88 year old YOB: 1931     Date of Admission:  10/11/2019  Date of Discharge::  10/12/2019  Admitting Physician:  Tariq Garcia MD  Discharge Physician:  Kristofer Sheikh MD          Admission Diagnoses:   LEFT INGUINAL HERNIA          Discharge Diagnosis:   LEFT INGUINAL HERNIA          Procedures:   Procedure(s): LAPAROSCOPIC LEFT INGUINAL HERNIA REPAIR WITH MESH       No procedures performed during this admission           Medications Prior to Admission:     No medications prior to admission.             Discharge Medications:     Current Discharge Medication List      START taking these medications    Details   HYDROcodone-acetaminophen (NORCO) 5-325 MG tablet Take 1 tablet by mouth every 4 hours as needed for moderate to severe pain  Qty: 15 tablet, Refills: 0    Associated Diagnoses: Left inguinal hernia      senna-docusate (SENOKOT-S/PERICOLACE) 8.6-50 MG tablet Take 1-2 tablets by mouth 2 times daily  Qty: 15 tablet, Refills: 0    Associated Diagnoses: Left inguinal hernia         CONTINUE these medications which have NOT CHANGED    Details   lisinopril-hydrochlorothiazide (PRINZIDE/ZESTORETIC) 20-25 MG tablet Take 1 tablet by mouth daily  Qty: 90 tablet, Refills: 3    Associated Diagnoses: Essential hypertension with goal blood pressure less than 140/90      tamsulosin (FLOMAX) 0.4 MG capsule Take 1 capsule (0.4 mg) by mouth daily  Qty: 30 capsule, Refills: 11    Associated Diagnoses: Urinary retention                   Consultations:   No consultations were requested during this admission          Hospital Course:   The patient's hospital course was unremarkable.  He was admitted overnight for urinary retention for which he has a history. He was restarted on his PTA flomax. He recovered as anticipated and experienced no post-operative complications.          Discharge Instructions and Follow-Up:   Discharge  diet: Regular   Discharge activity: Activity as tolerated   Discharge follow-up: Follow up with Dr. Martinez in 2 weeks   Wound care: Steri-strips off in 10-14 days           Discharge Disposition:   Discharged to home      Kristofer Sheikh MD

## 2019-10-11 NOTE — OR NURSING
Pt. Continues to feel urge to void but is unable to void.  Bladder scanned for 435 mls.  Call placed to Dr. Garcia.  Order obtained to straight cath patient.   Dr. Garcia states that pt. Will spend the night in observation for continued recovery.

## 2019-10-11 NOTE — ANESTHESIA PREPROCEDURE EVALUATION
Anesthesia Pre-Procedure Evaluation    Patient: Bhavesh Schafer   MRN: [id number] : 1931          Preoperative Diagnosis: LEFT INGUINAL HERNIA    Procedure(s):  LAPAROSCOPIC LEFT INGUINAL HERNIA REPAIR WITH MESH POSSIBLE OPEN    Past Medical History:   Diagnosis Date     Essential hypertension, benign      Inguinal hernia without mention of obstruction or gangrene, unilateral or unspecified, (not specified as recurrent) 2013    RIght     Macular degeneration      Macular degeneration (senile) of retina, unspecified     bilat     Pure hypercholesterolemia      Retinal neovascularization NOS     L     Past Surgical History:   Procedure Laterality Date     HC REMOVE TONSILS/ADENOIDS,<13 Y/O  age 8     HC REPAIR ING HERNIA 5+ YO,BALTAZAR      L, recurrent with small bowel incarceration     HC REPAIR ING HERNIA,5+Y/O,REDUCIBL      L     HC TOOTH EXTRACTION W/FORCEP       LAPAROSCOPIC HERNIORRHAPHY INGUINAL  2013    Procedure: LAPAROSCOPIC HERNIORRHAPHY INGUINAL;  Laparoscopic Right Inguinal Hernia Repair with Mesh ;  Surgeon: Martin Quinteros MD;  Location: [facility]     LASER SURGERY OF EYE      L retina laser therapy for neovascular membrane       Anesthesia Evaluation     . Pt has had prior anesthetic. Type: General    No history of anesthetic complications          ROS/MED HX    ENT/Pulmonary:      (-) tobacco use, asthma, COPD and sleep apnea   Neurologic:  - neg neurologic ROS     Cardiovascular:     (+) Dyslipidemia, hypertension----. : . . . :. .      (-) CAD   METS/Exercise Tolerance:     Hematologic:     (+) Anemia, -      Musculoskeletal:         GI/Hepatic:        (-) GERD and liver disease   Renal/Genitourinary:      (-) renal disease   Endo:      (-) Type I DM and Type II DM   Psychiatric:         Infectious Disease:         Malignancy:         Other:                          Physical Exam  Normal systems: cardiovascular and pulmonary    Airway   Mallampati: I  TM distance: >3 FB  Neck  "ROM: full    Dental   (+) missing and chipped  Comment: Denies loose teeth.    Cardiovascular       Pulmonary             Lab Results   Component Value Date    WBC 6.1 01/29/2018    HGB 12.6 (L) 09/26/2019    HCT 39.9 (L) 01/29/2018     01/29/2018     09/26/2019    POTASSIUM 4.5 09/26/2019    CHLORIDE 102 09/26/2019    CO2 24 09/26/2019    BUN 34 (H) 09/26/2019    CR 1.01 09/26/2019    GLC 84 09/26/2019    GRAY 9.1 09/26/2019    PHOS 3.1 03/21/2019    ALBUMIN 3.6 03/21/2019    PROTTOTAL 7.4 01/29/2018    ALT 13 01/29/2018    AST 18 01/29/2018    ALKPHOS 110 01/29/2018    BILITOTAL 0.5 01/29/2018    TSH 1.24 12/22/2011       Preop Vitals  BP Readings from Last 3 Encounters:   10/11/19 122/75   09/26/19 138/71   09/10/19 104/58    Pulse Readings from Last 3 Encounters:   09/26/19 76   09/10/19 77   06/13/19 98      Resp Readings from Last 3 Encounters:   10/11/19 16   06/13/19 16   03/23/18 16    SpO2 Readings from Last 3 Encounters:   10/11/19 96%   09/26/19 98%   06/13/19 95%      Temp Readings from Last 1 Encounters:   10/11/19 36.2  C (97.1  F) (Oral)    Ht Readings from Last 1 Encounters:   10/11/19 1.702 m (5' 7\")      Wt Readings from Last 1 Encounters:   10/11/19 60.9 kg (134 lb 3.2 oz)    Estimated body mass index is 21.02 kg/m  as calculated from the following:    Height as of this encounter: 1.702 m (5' 7\").    Weight as of this encounter: 60.9 kg (134 lb 3.2 oz).       Anesthesia Plan      History & Physical Review  History and physical reviewed and following examination; no interval change.    ASA Status:  2 .    NPO Status:  > 8 hours    Plan for General and ETT with Intravenous induction. Maintenance will be Balanced.    PONV prophylaxis:  Ondansetron (or other 5HT-3) and Dexamethasone or Solumedrol       Postoperative Care  Postoperative pain management:  Multi-modal analgesia.      Consents  Anesthetic plan, risks, benefits and alternatives discussed with:  Patient.  Use of blood products " discussed: Yes.   Use of blood products discussed with Patient.  Consented to blood products.  .                 Jesse Rdz MD

## 2019-10-11 NOTE — ANESTHESIA CARE TRANSFER NOTE
Patient: Bhavesh Schafer    Procedure(s):  LAPAROSCOPIC LEFT INGUINAL HERNIA REPAIR WITH MESH POSSIBLE OPEN    Diagnosis: LEFT INGUINAL HERNIA  Diagnosis Additional Information: No value filed.    Anesthesia Type:   General, ETT     Note:  Airway :Face Mask  Patient transferred to:PACU  Comments: Neuromuscular blockade reversed after TOF 4/4, spontaneous respirations, adequate tidal volumes, followed commands to voice, oropharynx suctioned with soft flexible catheter, extubated atraumatically, extubated with suction, airway patent after extubation.  Oxygen via facemask at 8 liters per minute to PACU. Oxygen tubing connected to wall O2 in PACU, SpO2, NiBP, and EKG monitors and alarms on and functioning, Joelle Hugger warmer connected to patient gown, report on patient's clinical status given to PACU RN, RN questions answered. Handoff Report: Identifed the Patient, Identified the Reponsible Provider, Reviewed the pertinent medical history, Discussed the surgical course, Reviewed Intra-OP anesthesia mangement and issues during anesthesia, Set expectations for post-procedure period and Allowed opportunity for questions and acknowledgement of understanding      Vitals: (Last set prior to Anesthesia Care Transfer)    CRNA VITALS  10/11/2019 1247 - 10/11/2019 1321      10/11/2019             NIBP:  (!) 151/96    NIBP Mean:  113                Electronically Signed By: EKATERINA Garcia CRNA  October 11, 2019  1:21 PM

## 2019-10-11 NOTE — OP NOTE
General Surgery Operative Note    PREOPERATIVE DIAGNOSIS:  LEFT INGUINAL HERNIA    POSTOPERATIVE DIAGNOSIS:  left inguinal hernia    PROCEDURE: LAPAROSCOPIC LEFT INGUINAL HERNIA REPAIR WITH MESH    ANESTHESIA:  General.    PREOPERATIVE MEDICATIONS: Ancef    SURGEON:  Tariq Garcia MD    ASSISTANT:   Kristofer Sheikh MD  A first assistant was necessary owing to challenging laparoscopic visualization and exposure.    INDICATIONS:  Symptomatic left inguinal Hernia    DESCRIPTION OF PROCEDURE: The patient was taken to the operating suite and uneventfully endotracheally intubated. The abdomen and groin were prepped and draped in a sterile fashion. Surgeon initiated timeout was acknowledged. We made a curvilinear incision at the underside of the umbilicus and took this down through skin and subcutaneous tissue. We dissected down until the anterior rectus sheath was encountered. We incised along the fascia such that we were looking at the rectus muscle directly. The rectus muscle was retracted laterally and we inserted our dissecting balloon along the posterior rectus sheath toward the pubic bone. Once this was in place, we removed the obturator and inserted our camera. We then instilled air into the dissecting balloon and under direct visualization created our preperitoneal space. Dissecting balloon was then removed and our working balloon was placed and positioned. Two 5 mm trocars were placed along the lower midline under direct laparoscopic visualization. We began our dissection on the left side. Using combination of sharp and blunt dissection, we created a plane behind the abdominal wall and the underlying peritoneum. This was done in a blunt and atraumatic fashion, although there was significant scarring on the right side from previous mesh hernia repair.  Several small bridging vessels were controlled with clips.  Connecting bands of the posterior rectus sheath were divided bluntly and with cautery. The inferior  epigastric vessels were visualized running along the underside of the abdominal wall.  We followed the epigastric vessels down until we were able to identify the internal ring. Along this ring there was obvious fatty attachments and a persistent hernia sac. We began dissecting out the spermatic cord and we were able to easily identify the vas deferens and spermatic vessels. Once these were teased out, we identified the hernia sac and dissected this down and back such that it was well away from the orifice of the internal ring.  Due to bleeding in our efforts to reduce the hernia sac, I elected to divide the sac along its midpoint with cautery.  I then closed the proximal end of the sac with an Endoloop and several clips.  An area where the peritoneum was entered was also closed with clips.  We continued our dissection until we had an excellent space in the preperitoneal area. We then placed a Progrip mesh within this space. The mesh was held in excellent position under direct visualization until the insufflation was completely out of the preperitoneal space. We then removed the 5 mm working ports under direct visualization. We removed the working balloon and identified the fascial edges of our trocar site. The fascia was then closed with a running 0 Vicryl suture. The wound was then irrigated and all skin edges were reapproximated with 4-0 Vicryl and Steri-Strips. At the conclusion of the case, all lap and needle counts were correct.     ESTIMATED BLOOD LOSS: 40 mL    INTRAOPERATIVE FINDINGS: Indirect left inguinal hernia    Tariq Garcia MD, MD

## 2019-10-11 NOTE — BRIEF OP NOTE
[facility]    Brief Operative Note    Pre-operative diagnosis: LEFT INGUINAL HERNIA  Post-operative diagnosis LEFT INGUINAL HERNIA  Procedure: Procedure(s):  LAPAROSCOPIC LEFT INGUINAL HERNIA REPAIR WITH MESH POSSIBLE OPEN  Surgeon: Surgeon(s) and Role:     * Tariq Garcia MD - Primary     * Kristofer Sheikh MD - Assisting  Anesthesia: General   Estimated blood loss: Less than 50 ml  Drains: None  Specimens: * No specimens in log *  Findings:   left inguinal hernia present, mesh placed. TEPP.  Complications: None.  Implants:    Implant Name Type Inv. Item Serial No.  Lot No. LRB No. Used   MESH PROGRIP LAPAROSCOPIC 5.9X3.9&quot; PARIETEX SELF-FIX [id number] Mesh MESH PROGRIP LAPAROSCOPIC 5.9X3.9&quot; PARIETEX SELF-FIX [id number] [facility] [id number] Left 1      Kristofer Sheikh MD  Surgery PGY-4

## 2019-10-11 NOTE — ANESTHESIA POSTPROCEDURE EVALUATION
Patient: Bhavesh Schafer    Procedure(s):  LAPAROSCOPIC LEFT INGUINAL HERNIA REPAIR WITH MESH POSSIBLE OPEN    Diagnosis:LEFT INGUINAL HERNIA  Diagnosis Additional Information: No value filed.    Anesthesia Type:  General, ETT    Note:  Anesthesia Post Evaluation    Patient location during evaluation: PACU  Patient participation: Able to fully participate in evaluation  Level of consciousness: awake and alert  Pain management: adequate  Airway patency: patent  Cardiovascular status: acceptable  Respiratory status: acceptable  Hydration status: acceptable  PONV: none     Anesthetic complications: None          Last vitals:  Vitals:    10/11/19 1318 10/11/19 1330 10/11/19 1345   BP: (!) 146/86 135/81 133/77   Pulse: 82 66 65   Resp: 16 15 10   Temp: 36.6  C (97.8  F) 35.7  C (96.3  F) 35.8  C (96.4  F)   SpO2: 99% 99% 96%         Electronically Signed By: Jesse Rdz MD  October 11, 2019  2:09 PM

## 2019-10-12 PROBLEM — K40.90 INGUINAL HERNIA: Status: ACTIVE | Noted: 2019-01-01

## 2019-10-12 NOTE — DISCHARGE SUMMARY
VSS, denies pain, voiding without difficulty. Soco diet. BS+, incision CDI, passing flatus. Band aids CDI. Denies pain. Pt has been discharge October 12, 2019 12:29 PM. Discharge instructions and education reviewed. Medications scheduled and follow up appts discussed. All belongings sent with pt.

## 2019-10-12 NOTE — DISCHARGE INSTRUCTIONS
[facility]  Discharge Instructions: Post-Operative Laparoscopic Inguinal Hernia    ACTIVITY    Take frequent, short walks and increase your activity gradually.      Avoid strenuous physical activity or heavy lifting greater than 15 lbs. for 3-4 weeks.  You may climb stairs.    You may drive without restrictions when you are not using any prescription pain medication and feel comfortable in a car.    You may return to work/school when you are comfortable without any prescription pain medication.    WOUND CARE    You may remove your outer dressing or Band-Aids and shower 48 hours after the surgery.  Pat your incisions dry and leave them open to air.  Re-apply dressing (Band-Aids or gauze/tape) as needed for comfort or drainage.    You may have steri-strips (looks like white tape) on your incision.  You may peel off the steri-strips 2 weeks after your surgery if they have not peeled off on their own.     Do not soak your incisions in a tub or pool for 2 weeks.     Do not apply any lotions, creams, or ointments to your incisions.    A ridge under your incisions is normal and will gradually resolve.    DIET    Start with liquids, then gradually resume your regular diet as tolerated.     Drink plenty of fluids to stay hydrated.    PAIN    Expect some tenderness and discomfort at the incision site(s).  Use the prescribed pain medication at your discretion.  Expect gradual resolution of your pain over several days.    You may take Tylenol and/or ibuprofen with food (unless you have been told not to) instead of or in addition to your prescribed pain medication.     Do not drink alcohol or drive while you are taking pain medications.    You may apply ice to your incisions in 20 minute intervals as needed for the next 48 hours.  After that time, consider switching to heat if you prefer.    EXPECTATIONS    You may notice air in your scrotum and/or penis after the surgery.  This is due to  the gas used during the surgery.  You can expect some swelling and bruising involving the scrotum and/or penis as well as numbness.  These symptoms are expected and should gradually resolve in the next few days.  You may use ice to help with the swelling and try placing a rolled hand towel below your scrotum to help alleviate scrotal discomfort.  If you develop significant testicular or penile pain, please call our office and speak with a nurse.    Pain medications can cause constipation.  Limit use when possible.  Take over the counter stool softener/stimulant, such as Colace or Senna, 1-2 times a day with plenty of water.  You may take a mild over the counter laxative, such as Miralax or a suppository, as needed.  You may take 1 oz. (2 tablespoons) Milk of Magnesia the evening following surgery to encourage bowel movement.  You may discontinue these medications once you are having regular bowel movements and/or are no longer taking your narcotic pain medication.     You may have shoulder or upper back discomfort due to the gas used in surgery.  This is temporary and should resolve in 48-72 hours.  Short, frequent walks may help with this.    FOLLOW UP    Our office will contact you in approximately 2 weeks to check on your progress and answer any questions you may have.  If you are doing well, you will not need to return for a follow up appointment.  If any concerns are identified over the phone, we will help you make an appointment to see a provider.     If you have not received a phone call, have any questions or concerns, or would like to be seen, please call us at [phone] and ask to speak with our nurse.  We are located at [address] [place] [address].    CALL OUR OFFICE [phone] IF YOU HAVE:     Chills or fever above 101 F.    Increased redness, warmth, or drainage at your incisions.    Significant bleeding.    Pain not relieved by your pain medication or rest.    Increasing  pain after the first 48 hours.    Any other concerns or questions.    Revised January 2018      Same Day Surgery Discharge Instructions for  Sedation and General Anesthesia       It's not unusual to feel dizzy, light-headed or faint for up to 24 hours after surgery or while taking pain medication.  If you have these symptoms: sit for a few minutes before standing and have someone assist you when you get up to walk or use the bathroom.      You should rest and relax for the next 24 hours. We recommend you make arrangements to have an adult stay with you for at least 24 hours after your discharge.  Avoid hazardous and strenuous activity.      DO NOT DRIVE any vehicle or operate mechanical equipment for 24 hours following the end of your surgery.  Even though you may feel normal, your reactions may be affected by the medication you have received.      Do not drink alcoholic beverages for 24 hours following surgery.       Slowly progress to your regular diet as you feel able. It's not unusual to feel nauseated and/or vomit after receiving anesthesia.  If you develop these symptoms, drink clear liquids (apple juice, ginger ale, broth, 7-up, etc. ) until you feel better.  If your nausea and vomiting persists for 24 hours, please notify your surgeon.        All narcotic pain medications, along with inactivity and anesthesia, can cause constipation. Drinking plenty of liquids and increasing fiber intake will help.      For any questions of a medical nature, call your surgeon.      Do not make important decisions for 24 hours.      If you had general anesthesia, you may have a sore throat for a couple of days related to the breathing tube used during surgery.  You may use Cepacol lozenges to help with this discomfort.  If it worsens or if you develop a fever, contact your surgeon.       If you feel your pain is not well managed with the pain medications prescribed by your surgeon, please contact your surgeon's office to let  them know so they can address your concerns.     Today you were given 650 mg of Tylenol at 2:30pm. The recommended daily maximum dose is 4000 mg.       **If you have concerns or questions about your procedure,    please contact Dr Garcia at  [phone]**

## 2019-10-12 NOTE — PROGRESS NOTES
A&O, with speech difficulty, VSS on RA, LS clear, Continent for both B and B, having frequent trips to the bathroom with minimal urine outputs, has H/O urine retention, CMS intact, dressing on incision CDI, up with SBA, denies pain at rest with discomfort when moving, patient is claiming that he brought 2 bags prior to being admitted at the OBS unit, but NA from PACU mentioned that he  brought 2 plastic bags and one small leather bag from PACU.

## 2019-10-12 NOTE — PLAN OF CARE
Pt. A&OX4. VSS on Ra. Denies pain. Tolerating regular diet. Up SBA. IV running LR @ 100. CMS/Skin intact. Incision CDI. Voiding frequently in small amounts using urinal, has hx of urinary retention, bladder scanned X2 showing 93, 11mL. Melatonin given X1. Discharge pending.

## 2019-10-12 NOTE — PROGRESS NOTES
"General Surgery Progress Note    Admission Date: 10/11/2019  Today's Date: 10/12/2019         Assessment:      Bhavesh Schafer is a 88 year old male POD 1 s/p lap left inguinal hernia repair. Remained overnight in hospital due to urinary retention         Plan:   - Discharge to home today. Instructions reviewed, questions answered. Follow-up with surgical team in 2 weeks via phone or in clinic  - Restart PTA flomax today, restart lisinopril tomorrow.   - Stop IV fluids  - Regular diet  - Senna-docusate for bowel regimen as needed  - Tylenol for pain        Interval History:   Afebrile overnight, vitals stable on room air. Tolerating diet, no nausea. Urinary retention last night, better today. Minimal pain, controlled with Tylenol.          Physical Exam:   /67 (BP Location: Right arm)   Pulse 72   Temp 96.4  F (35.8  C) (Oral)   Resp 16   Ht 1.702 m (5' 7\")   Wt 60.9 kg (134 lb 3.2 oz)   SpO2 97%   BMI 21.02 kg/m    I/O last 3 completed shifts:  In: 1300 [I.V.:1300]  Out: 1090 [Urine:1050; Blood:40]  General: NAD, pleasant, alert and oriented x3  Cardiovascular: regular rate and rhythm; S1 and S2 distinct without murmur   Respiratory: lungs clear to auscultation bilaterally without wheezes, rales or rhonchi   Abdomen: soft, appropriately tender around incisions, non distended  Incision: clean, dry, and intact. No erythema or drainage  Extremities: no lower extremity edema, no calf tenderness      -------------------------------    Katrin Guerrero PA-C  [facility]  [phone]      "

## 2019-10-28 PROBLEM — S32.000A CLOSED COMPRESSION FRACTURE OF LUMBOSACRAL SPINE (H): Status: ACTIVE | Noted: 2019-01-01

## 2019-10-28 NOTE — NURSING NOTE
"Chief Complaint   Patient presents with     Musculoskeletal Problem     low back pain     /73   Pulse 82   Temp 97.9  F (36.6  C) (Oral)   Resp 18   Ht 1.702 m (5' 7\")   SpO2 96%   BMI 21.02 kg/m   Estimated body mass index is 21.02 kg/m  as calculated from the following:    Height as of this encounter: 1.702 m (5' 7\").    Weight as of 10/11/19: 60.9 kg (134 lb 3.2 oz).  bp completed using cuff size: regular      Health Maintenance addressed:  NONE    n/a    Purnima Bernard MA    "

## 2019-10-28 NOTE — RESULT ENCOUNTER NOTE
Results of the x-ray were discussed with the patient in details.      He was informed that his pain is likely secondary to his multiple compression fractures.  Patient's age, severity of the pain, multiple compression fractures indicates that it would be safer for the patient to be treated in an inpatient setting.  I would recommend hospital admission, CT or MRI of the thoracolumbar spine adequate pain control and possibly transitioning to TCU or home with a PCA.  The patient agrees with inpatient management of pain from multiple compression fractures.    On-call admitting hospitalist at [facility] was paged. I will be awaiting his/her response.    Stacie Light MD

## 2019-10-28 NOTE — TELEPHONE ENCOUNTER
Reason for Call:  Other appointment    Detailed comments: Severe Low back pain wants to come in this am     Phone Number Patient can be reached at: Home number on file [phone] (home)    Best Time: this am    Can we leave a detailed message on this number? Not Applicable    Call taken on 10/28/2019 at 8:21 AM by Itzel Ibarra

## 2019-10-28 NOTE — LETTER
Transition Communication Hand-off for Care Transitions to Next Level of Care Provider    Name: Bhavesh Schafer  : 1931  MRN #: [id number]  Primary Care Provider: Eugene Maki  Primary Care MD Name: Dr Maki  Primary Clinic: [address] [facility] [address]  Primary Care Clinic Name: [facility]  Reason for Hospitalization:  multiple compression fractures  Closed compression fracture of lumbosacral spine (H)  Admit Date/Time: 10/28/2019  7:39 PM  Discharge Date: 10/30/19  Payor Source: Payor: MEDICARE / Plan: MEDICARE / Product Type: Medicare /     Readmission Assessment Measure (BRANDO) Risk Score/category: OBSERVATION ADMIT STATUS           Reason for Communication Hand-off Referral: Fragility    Discharge Plan:Home       Concern for non-adherence with plan of care: No  Discharge Needs Assessment:  Needs      Most Recent Value   Equipment Currently Used at Home  cane, straight   Current Discharge Risk  lack of support system/caregiver, lives alone   # of Referrals Placed by [facility]  Internal Clinic Care Coordination, Homecare, Scheduled Follow-up appointments            Follow-up specialty is recommended: [facility] if continued back issues   Follow-up plan:    Future Appointments   Date Time Provider Department Center   2019  2:30 PM Eugene Maki MD [facility]       Any outstanding tests or procedures:        Referrals     Future Labs/Procedures    Home care nursing referral     Comments:    RN skilled nursing visit. RN to assess vital signs and weight, respiratory and cardiac status and pain level and activity tolerance.    Your provider has ordered home care nursing services. If you have not been contacted within 2 days of your discharge please call the inpatient department phone number at [phone] .    Home Care OT Referral for Hospital Discharge     Comments:    OT to eval and treat    Your provider has ordered home care - occupational therapy. If you  have not been contacted within 2 days of your discharge please call the department phone number listed on the top of this document.    Home Care PT Referral for Hospital Discharge     Comments:    PT to eval and treat    Your provider has ordered home care - physical therapy. If you have not been contacted within 2 days of your discharge please call the department phone number listed on the top of this document.    Home Care Social Service Referral for Hospital Discharge     Comments:     to address what services pt may be eligible for    Your provider has ordered home care - . If you have not been contacted within 2 days of your discharge please call the department phone number listed on the top of this document.            Key Recommendations: Very pleasant 88 yr old that lives alone in a one level duplex admitted with severe back pain.  Orthopedics saw him here.  A lumbar MRI was done and noted new T12 fracture and old L1 and L3. Orthotics fitted with a TSLO brace and pt was discharged home with PRN Tylenol, Oxycodone and Flexeril and [facility] RN/PT/OT and SW.  Pt does have a community of friends that help him with errands.                                                                                                                                                                            Thank-you,    Kimberlee Pritchard  RN Care Coordinator  [facility]    AVS/Discharge Summary is the source of truth; this is a helpful guide for improved communication of patient story

## 2019-10-28 NOTE — PROGRESS NOTES
Subjective     Bhavesh Schafer is a 88 year old male who presents to clinic today for the following health issues:    HPI   Musculoskeletal problem/pain      Duration: ongoing    Description  Location: lower back surgery    Intensity:  moderate    Accompanying signs and symptoms: lower back pain    History  Previous similar problem: YES  Previous evaluation:  none    Precipitating or alleviating factors:  Trauma or overuse: patient collapsed after hernia surgery  Aggravating factors include: sitting, standing and walking    Therapies tried and outcome: acetaminophen no relief    The patient states that he was started on Flomax 1 week prior to surgery. He had problems voiding after surgery. He was hospitalized x 1 day.     He stopped Flomax 1 week ago. Voiding well without it.   Voided 4 times at night and 3 times during the day.     Lower back pain which he attributed to a muscle strain.   Pain is localized in the lower back. Pain is on the scale of 9-10/10.   Pain was present prior to surgery but not as severe. Previously the pain used to resolve but this time, this pain is constant. The patient has been taking ibuprofen and acetaminophen without relief.     Social: The patient lives alone. He relies on his friends for transportation.       Last bowel movement was yesterday. No blood in the stool.     Patient uses a cane of ambulation.     Patient Active Problem List   Diagnosis     Macular degeneration (senile) of retina     Retinal neovascularization     Persistent insomnia     HYPERLIPIDEMIA LDL GOAL <130     Hypertension goal BP (blood pressure) < 140/90     Iron deficiency anemia     Advanced directives, counseling/discussion     Right inguinal hernia     Acute urinary retention     History of depression     Other closed nondisplaced fracture of proximal end of left humerus, initial encounter     Left inguinal hernia     Inguinal hernia     Past Surgical History:   Procedure Laterality Date     HC REMOVE  "TONSILS/ADENOIDS,<13 Y/O  age 8     HC REPAIR ING HERNIA 5+ YO,BALTAZAR      L, recurrent with small bowel incarceration     HC REPAIR ING HERNIA,5+Y/O,REDUCIBL      L     HC TOOTH EXTRACTION W/FORCEP       LAPAROSCOPIC HERNIORRHAPHY INGUINAL  2013    Procedure: LAPAROSCOPIC HERNIORRHAPHY INGUINAL;  Laparoscopic Right Inguinal Hernia Repair with Mesh ;  Surgeon: Martin Quinteros MD;  Location: [facility]     LAPAROSCOPIC HERNIORRHAPHY INGUINAL BILATERAL Left 10/11/2019    Procedure: LAPAROSCOPIC LEFT INGUINAL HERNIA REPAIR WITH MESH POSSIBLE OPEN;  Surgeon: Tariq Garcia MD;  Location:  OR     LASER SURGERY OF EYE      L retina laser therapy for neovascular membrane       Social History     Tobacco Use     Smoking status: Former Smoker     Packs/day: 0.00     Last attempt to quit: 3/5/1979     Years since quittin.6     Smokeless tobacco: Never Used   Substance Use Topics     Alcohol use: No     Comment: rare red wine     Family History   Problem Relation Age of Onset     Cancer Mother         colon     Cerebrovascular Disease Father         ruptured anuerysm     Hypertension Father            Reviewed and updated as needed this visit by Provider         Review of Systems   ROS COMP: Constitutional, HEENT, cardiovascular, pulmonary, gi and gu systems are negative, except as otherwise noted.      Objective    /73   Pulse 82   Temp 97.9  F (36.6  C) (Oral)   Resp 18   Ht 1.702 m (5' 7\")   SpO2 96%   BMI 21.02 kg/m    Body mass index is 21.02 kg/m .  Physical Exam   GENERAL: healthy, alert and no distress  RESP: lungs clear to auscultation - no rales, rhonchi or wheezes  CV: regular rate and rhythm, normal S1 S2, no S3 or S4, no murmur, click or rub, no peripheral edema and peripheral pulses strong  MS: no gross musculoskeletal defects noted, no edema. Point tenderness to palpation in the lower thoracic and upper lumbar area. ROM is restricted by pain.     Diagnostic Test " Results:  Labs reviewed in Epic  Results for orders placed or performed in visit on 10/28/19 (from the past 24 hour(s))   CBC with platelets   Result Value Ref Range    WBC 9.4 4.0 - 11.0 10e9/L    RBC Count 4.26 (L) 4.4 - 5.9 10e12/L    Hemoglobin 13.4 13.3 - 17.7 g/dL    Hematocrit 40.4 40.0 - 53.0 %    MCV 95 78 - 100 fl    MCH 31.5 26.5 - 33.0 pg    MCHC 33.2 31.5 - 36.5 g/dL    RDW 13.0 10.0 - 15.0 %    Platelet Count 388 150 - 450 10e9/L           Assessment & Plan       ICD-10-CM    1. Lumbosacral pain M54.5 XR Lumbar Spine 2/3 Views     Basic metabolic panel     CBC with platelets     cyclobenzaprine (FLEXERIL) 5 MG tablet   2. Urinary frequency R35.0 PSA, screen   3. Encounter for screening for malignant neoplasm of prostate  Z12.5 PSA, screen   4. T12 compression fracture, initial encounter (H) S22.080A      The patient presented to clinic for severe lower back pain.  He states that he is unable to ambulate in the house or sleep well at night due to pain.  The current pain is on the scale of 9-10 out of 10.  Differential diagnosis for the pain were discussed with the patient.  Given his recent history of compression fractures noted in a CT of the abdomen in June 2019 there is a concern about worsening compression fractures.    Other differential diagnoses includes intervertebral pathology due to prostate abnormality, renal calculi, musculoskeletal strain.    The patient was given a prescription of cyclobenzaprine today.  Due to his age I was slightly concerned about oversedation with opioids.  He was informed that based on the results of the x-ray we will determine the next appropriate step.     Return in about 5 months (around 3/28/2020).    Stacie Light MD  [facility]

## 2019-10-28 NOTE — PATIENT INSTRUCTIONS
1- Continue to hold the Tamsolosin.   2- X-ray and blood work today. We will call you with the results.   3- Muscle relaxant started today.   4. See Dr. Maki within the next 5-7 days.

## 2019-10-29 NOTE — PLAN OF CARE
Cognitive Concerns/ Orientation : A&Ox4  BEHAVIOR & AGGRESSION TOOL COLOR: Green  CIWA SCORE: NA    ABNL VS/O2: VSS on RA  MOBILITY: SBA w/ Cane  PAIN MANAGMENT: Denies pain  DIET: Regular  BOWEL/BLADDER: Continent  ABNL LAB/BG:   DRAIN/DEVICES: None  TELEMETRY RHYTHM: NA  SKIN: Scabs to Right knee, ankle and elbow, blanchable redness  TESTS/PROCEDURES: MRI lumbar spine  D/C DAY/GOALS/PLACE: Pending 1-2 days  OTHER IMPORTANT INFO: PT,OT,SW, and Orthopedic surgery consulted

## 2019-10-29 NOTE — PROGRESS NOTES
10/29/19 0932   Quick Adds   Type of Visit Initial PT Evaluation   Living Environment   Lives With alone   Living Arrangements house  (duplex)   Home Accessibility stairs to enter home   Number of Stairs, Main Entrance 4   Stair Railings, Main Entrance railing on right side (ascending)   Self-Care   Usual Activity Tolerance good   Current Activity Tolerance good   Equipment Currently Used at Home cane, straight   Activity/Exercise/Self-Care Comment friends help get groceries, friends help with transportation, neighbors check in    Functional Level Prior   Ambulation 1-->assistive equipment   Transferring 1-->assistive equipment   Toileting 0-->independent   Bathing 0-->independent   Fall history within last six months yes   Prior Functional Level Comment mod indep with mobility, indep w/ ADLS and self cares   General Information   Onset of Illness/Injury or Date of Surgery - Date 10/28/19   Referring Physician Odell   Pertinent History of Current Problem (include personal factors and/or comorbidities that impact the POC) Pt with a history of HTN, HLP and chronic back pain who was directly admitted from the clinic on 10/28/2019 for acute on chronic back pain    Precautions/Limitations fall precautions   Cognitive Status Examination   Orientation orientation to person, place and time   Level of Consciousness alert   Follows Commands and Answers Questions 100% of the time   Personal Safety and Judgment at risk behaviors demonstrated   Pain Assessment   Patient Currently in Pain Yes, see Vital Sign flowsheet   Range of Motion (ROM)   ROM Comment LE ROM is WFL   Strength   Strength Comments 4-/5 HF grossly, remaining LE strength wFL   Bed Mobility   Bed Mobility Comments mod indep   Transfer Skills   Transfer Comments CGA w SEC   Gait   Gait Comments CGA w/ SEC, dec step length slow pace   Balance   Balance Comments uses SEC at baseline, impaired from baseline   General Therapy Interventions   Planned Therapy  "Interventions bed mobility training;gait training;transfer training   Intervention Comments Inst in using WW   Clinical Impression   Criteria for Skilled Therapeutic Intervention yes, treatment indicated   PT Diagnosis difficulty walking   Influenced by the following impairments decreased strenght, impaired gait, pain   Functional limitations due to impairments impaired functional mobility   Clinical Presentation Stable/Uncomplicated   Clinical Decision Making (Complexity) Low complexity   Therapy Frequency Daily   Predicted Duration of Therapy Intervention (days/wks) 2 days   Anticipated Equipment Needs at Discharge front wheeled walker   Anticipated Discharge Disposition Home with Home Therapy   Risk & Benefits of therapy have been explained Yes   Patient, Family & other staff in agreement with plan of care Yes   Therapy Certification   Start of care date 10/28/19   Certification date from 10/29/19   Certification date to 11/01/19   Medical Diagnosis back pain   Certification I certify the need for these services furnished under this plan of treatment and while under my care.  (Physician co-signature of this document indicates review and certification of the therapy plan).    [facility] [employer] \"6 Clicks\"   2016, Trustees of [facility], under license to [employer].  All rights reserved.   6 Clicks Short Forms Basic Mobility Inpatient Short Form   [facility] [employer]  \"6 Clicks\" V.2 Basic Mobility Inpatient Short Form   1. Turning from your back to your side while in a flat bed without using bedrails? 3 - A Little   2. Moving from lying on your back to sitting on the side of a flat bed without using bedrails? 3 - A Little   3. Moving to and from a bed to a chair (including a wheelchair)? 3 - A Little   4. Standing up from a chair using your arms (e.g., wheelchair, or bedside chair)? 3 - A Little   5. To walk in hospital room? 3 - A Little   6. Climbing 3-5 steps with a railing? 3 - A Little "   Basic Mobility Raw Score (Score out of 24.Lower scores equate to lower levels of function) 18   Total Evaluation Time   Total Evaluation Time (Minutes) 15

## 2019-10-29 NOTE — CONSULTS
Care Transition Initial Assessment - RN        Met with: Patient concerning Observation status and he was given an Observation brochure  DATA   Active Problems:    Closed compression fracture of lumbosacral spine (H)       Cognitive Status: awake, alert and oriented.        Contact information and PCP information verified: Yes  Lives With: alone   Living Arrangements: house(duplex)     Insurance concerns: Underinsured/limits on insurance  ASSESSMENT  Patient currently receives the following services: NA       Identified issues/concerns regarding health management: Pt is very pleasant.  He lives alone in a one level duplex.  He uses [employer] and also has three close friends that help him with errands.  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  PT has seen pt and recommended home care.  Pt has a [facility], so wanted to use [facility].  Pt is aware of home bound status.  PT also recommended a TSLO binder, Orthotics consult is pending.  Pt was relieved to hear he did not need surgical intervention.  He is very worried that the severe pain will come back.  ? If a Lidocaine patch would be of benefit, but pt does not have secondary insurance and the patches can be expensive.  Pt was not interested in talking with a Financial Counselor to assess if he would be eligible for any assistance.  He noted a friend once told him to not disclose his assets to anyone.       PLAN  Financial costs for the patient include:Co-pay, no secondary insurance  Patient given options and choices for discharge:Home care agencies  Patient/family is agreeable to the plan?  Yes: Home pending MRI results with [facility]       Patient anticipates needs for home equipment: Yes, wheeled walker  Transportation/person available to transport on day of discharge  is pt's friend     Appointments:   PCP 1/4/19 at 2:30PM    Care  (CTS) will continue to follow as needed.

## 2019-10-29 NOTE — CONSULTS
"[facility]    Orthopedic Consultation    Bhavesh Schafer MRN# [id number]   Age: 88 year old YOB: 1931     Date of Admission:  10/28/2019    Reason for consult: Multiple compression fractures       Requesting physician: Dr. 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  He was initially seen in the clinic this AM and had a x-ray of the lumbar spine obtained.  This showed compression fractures at T12, L1, and L3.  These have been seen on previous CT scan of the abdomen in June but are possibly worsened.  He was sent in from the clinic as he was having issues managing at home.\"  At time of visit patient states pain is much better with \"little pill\" they gave him. He denies any history or current radicular pain. Pain in low back/hip area per patient. Denies numbness or tingling of bilateral LE. No saddle anesthesia or loss of bowel or bladder. He lives at home independently and typically ambulates with a cane.           Past Medical History:     Past Medical History:   Diagnosis Date     Essential hypertension, benign      Inguinal hernia without mention of obstruction or gangrene, unilateral or unspecified, (not specified as recurrent) 6/2013    RIght     Macular degeneration      Macular " degeneration (senile) of retina, unspecified     bilat     Pure hypercholesterolemia      Retinal neovascularization NOS     L             Past Surgical History:     Past Surgical History:   Procedure Laterality Date     HC REMOVE TONSILS/ADENOIDS,<11 Y/O  age 8     HC REPAIR ING HERNIA 5+ YO,BALTAZAR      L, recurrent with small bowel incarceration     HC REPAIR ING HERNIA,5+Y/O,REDUCIBL      L     HC TOOTH EXTRACTION W/FORCEP       LAPAROSCOPIC HERNIORRHAPHY INGUINAL  2013    Procedure: LAPAROSCOPIC HERNIORRHAPHY INGUINAL;  Laparoscopic Right Inguinal Hernia Repair with Mesh ;  Surgeon: Martin Quinteros MD;  Location:  OR     LAPAROSCOPIC HERNIORRHAPHY INGUINAL BILATERAL Left 10/11/2019    Procedure: LAPAROSCOPIC LEFT INGUINAL HERNIA REPAIR WITH MESH POSSIBLE OPEN;  Surgeon: Tariq Garcia MD;  Location:  OR     LASER SURGERY OF EYE  8/04    L retina laser therapy for neovascular membrane             Social History:     Social History     Tobacco Use     Smoking status: Former Smoker     Packs/day: 0.00     Last attempt to quit: 3/5/1979     Years since quittin.6     Smokeless tobacco: Never Used   Substance Use Topics     Alcohol use: No     Comment: rare red wine             Family History:     Family History   Problem Relation Age of Onset     Cancer Mother         colon     Cerebrovascular Disease Father         ruptured anuerysm     Hypertension Father              Immunizations:     VACCINE/DOSE   Diptheria   DPT   DTAP   HBIG   Hepatitis A   Hepatitis B   HIB   Influenza   Measles   Meningococcal   MMR   Mumps   Pneumococcal   Polio   Rubella   Small Pox   TDAP   Varicella   Zoster             Allergies:     Allergies   Allergen Reactions     Atenolol Swelling     Fast pulse. Patient does not remember getting this medication.             Medications:     Current Facility-Administered Medications   Medication     acetaminophen (TYLENOL) Suppository 650 mg     acetaminophen  (TYLENOL) tablet 650 mg     cyclobenzaprine (FLEXERIL) tablet 5 mg     lisinopril-hydrochlorothiazide (PRINZIDE/ZESTORETIC) 20-25 MG per tablet 1 tablet     melatonin tablet 1 mg     naloxone (NARCAN) injection 0.1-0.4 mg     ondansetron (ZOFRAN-ODT) ODT tab 4 mg    Or     ondansetron (ZOFRAN) injection 4 mg     oxyCODONE (ROXICODONE) tablet 5-10 mg     senna-docusate (SENOKOT-S/PERICOLACE) 8.6-50 MG per tablet 1 tablet    Or     senna-docusate (SENOKOT-S/PERICOLACE) 8.6-50 MG per tablet 2 tablet             Review of Systems:   CV: NEGATIVE for chest pain, palpitations or peripheral edema  C: NEGATIVE for fever, chills, change in weight  E/M: NEGATIVE for ear, mouth and throat problems  R: NEGATIVE for significant cough or SOB          Physical Exam:   All vitals have been reviewed  Patient Vitals for the past 24 hrs:   BP Temp Temp src Pulse Resp SpO2 Weight   10/29/19 1137 (!) (P) 84/56 (P) 98  F (36.7  C) (P) Oral (P) 69 (P) 16 (P) 95 % --   10/29/19 0835 -- -- -- -- 17 -- --   10/29/19 0748 -- -- -- -- 17 -- --   10/29/19 0731 115/77 97.6  F (36.4  C) Oral 77 16 95 % --   10/29/19 0630 -- -- -- -- -- -- 55.4 kg (122 lb 3.2 oz)   10/29/19 0428 128/81 97.4  F (36.3  C) Oral 66 16 95 % --   10/28/19 2002 128/88 97.7  F (36.5  C) Oral 83 18 90 % --       Intake/Output Summary (Last 24 hours) at 10/29/2019 1248  Last data filed at 10/29/2019 0858  Gross per 24 hour   Intake 120 ml   Output --   Net 120 ml     Patient sitting comfortably at edge of bed, was able to stand with assist of cane and ambulate  No ecchymosis or erythema over entirety of the back and LE  Mild TTP only over right SI, no TTP over Thoracic or lumbar spinous processes or paraspinals  No pain with forward flexion or extension of spine, ROM limited   No notable swelling or edema  Full ROM of bilateral knees - 0 to 120 degrees  Stable to varus and valgus stress  Negative anterior drawer  No TTP over medial or lateral joint line  Hips flex to 100  degrees  Internal rotation 30 degrees, External rotation 15 degrees  No pain with internal/external rotation while in flexion  No TTP over great trochanters  Bilateral calves are soft, non-tender.  Bilateral lower extremity is NVI.  Sensation intact bilateral lower extremities  5/5 motor with resisted dorsi and plantar flexion bilaterally  5/5 hip flexors  5/5 quad  5/5 EHL  +Dp pulse          Data:   All laboratory data reviewed  Results for orders placed or performed during the hospital encounter of 10/28/19   Basic metabolic panel   Result Value Ref Range    Sodium 130 (L) 133 - 144 mmol/L    Potassium 3.9 3.4 - 5.3 mmol/L    Chloride 96 94 - 109 mmol/L    Carbon Dioxide 30 20 - 32 mmol/L    Anion Gap 4 3 - 14 mmol/L    Glucose 145 (H) 70 - 99 mg/dL    Urea Nitrogen 24 7 - 30 mg/dL    Creatinine 1.09 0.66 - 1.25 mg/dL    GFR Estimate 60 (L) >60 mL/min/[1.73_m2]    GFR Estimate If Black 70 >60 mL/min/[1.73_m2]    Calcium 8.8 8.5 - 10.1 mg/dL   CBC with platelets   Result Value Ref Range    WBC 9.2 4.0 - 11.0 10e9/L    RBC Count 3.76 (L) 4.4 - 5.9 10e12/L    Hemoglobin 11.8 (L) 13.3 - 17.7 g/dL    Hematocrit 35.2 (L) 40.0 - 53.0 %    MCV 94 78 - 100 fl    MCH 31.4 26.5 - 33.0 pg    MCHC 33.5 31.5 - 36.5 g/dL    RDW 13.1 10.0 - 15.0 %    Platelet Count 333 150 - 450 10e9/L   Care Transition RN/DONATO IP Consult    Narrative    Kimberlee Pritchard RN     10/29/2019 12:03 PM  Care Transition Initial Assessment - RN        Met with: Patient concerning Observation status and he was given   an Observation brochure  DATA   Active Problems:    Closed compression fracture of lumbosacral spine (H)       Cognitive Status: awake, alert and oriented.        Contact information and PCP information verified: Yes  Lives With: alone   Living Arrangements: house(duplex)     Insurance concerns: Underinsured/limits on insurance  ASSESSMENT  Patient currently receives the following services: NA       Identified issues/concerns regarding  health management: Pt is   very pleasant.  He lives alone in a one level duplex.  He uses   [employer] and also has three close friends that help him   with errands.  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  PT has seen pt and recommended home care.  Pt has a   [facility]CP, so wanted to use [facility].  Pt is aware   of home bound status.  PT also recommended a TSLO binder,   Orthotics consult is pending.  Pt was relieved to hear he did not need surgical intervention.    He is very worried that the severe pain will come back.  ? If a   Lidocaine patch would be of benefit, but pt does not have   secondary insurance and the patches can be expensive.  Pt was not   interested in talking with a Financial Counselor to assess if he   would be eligible for any assistance.  He noted a friend once   told him to not disclose his assets to anyone.       PLAN  Financial costs for the patient include:Co-pay, no secondary   insurance  Patient given options and choices for discharge:Home care   agencies  Patient/family is agreeable to the plan?  Yes: Home pending MRI   results with [facility]       Patient anticipates needs for home equipment: Yes, wheeled walker  Transportation/person available to transport on day of discharge    is pt's friend     Appointments:   PCP 1/4/19 at 2:30PM    Care  (CTS) will continue to follow as   needed.                  Attestation:  I have reviewed today's vital signs, notes, medications, labs and imaging with Dr. Pepito Godinez.  Amount of time performed on this consult: 30 minutes.    Jocelyne Mendez PA-C

## 2019-10-29 NOTE — PHARMACY-ADMISSION MEDICATION HISTORY
Admission medication history interview status for the 10/28/2019  admission is complete. See EPIC admission navigator for prior to admission medications     Medication history source reliability:Good    Actions taken by pharmacist (provider contacted, etc):None     Additional medication history information not noted on PTA med list :None    Medication reconciliation/reorder completed by provider prior to medication history? No    Time spent in this activity: 15min    Prior to Admission medications    Medication Sig Last Dose Taking? Auth Provider   acetaminophen (TYLENOL) 325 MG tablet Take 1-2 tablets (325-650 mg) by mouth every 6 hours as needed for pain 10/28/2019 at Unknown time Yes Katrin Guerrero PA-C   cyclobenzaprine (FLEXERIL) 5 MG tablet Take 1 tablet (5 mg) by mouth 2 times daily as needed (muscle pain) 10/28/2019 at Unknown time Yes Stacie Light MD   lisinopril-hydrochlorothiazide (PRINZIDE/ZESTORETIC) 20-25 MG tablet Take 1 tablet by mouth daily 10/28/2019 at Unknown time Yes Eugene Maki MD   polyethylene glycol (MIRALAX/GLYCOLAX) packet Take 1 packet by mouth daily 10/28/2019 at Unknown time Yes Unknown, Entered By History   psyllium (METAMUCIL/KONSYL) Packet Take 1 packet by mouth daily 10/28/2019 at Unknown time Yes Unknown, Entered By History

## 2019-10-29 NOTE — PROGRESS NOTES
"10/29/19, [place] [address] [facility] 6619/6619-01, male, Height: Data Unavailable, Weight: 122 lbs 3.2 oz, Ordered by: , Dx: Closed compression fracture of lumbosacral spine with routine healing, subsequent encounter  T-12, L-1 L-3 FX, MRN #: [id number].    S:   Patient was seen today at 6619/6619-01 present for the measurement and delivery of a Breg backpack  thoracolumbosacral orthosis (Ref # [id number]).   Health History & Progression: .  Patient's history of utilizing a TLSO spinal orthosis: no history of utilizing orthoses for ailment being addressed today.  Patient would like to utilize the brace provided today to achieve the following functional goals: management of pain being treated.  Back pain is provoked by: bending and changing position.   Patient is currently limited by: low back both pain with inability to flex/extend/rotate trunk too far without exhibiting further pain symptoms.     O:   The patient is laying in bed in pain and was fit with a Horizon 456 TLSO without incident.      A:     The [place]  456 TLSO bracing modular design allows the user to adjust the amount of motion restriction to the changing needs of each patient in the lumbar and thoracolumbar spinal region. The draw string tightening system provides effective compression for patient outcomes, regardless of patient strength. This TLSO design allows one product to comfortably fit waists ranging from 26\" - 60\", without compromising effectiveness and without the need for tools. The shoulder straps allow to pull the patient into the optimal posture position for treatment of back ailment..  Patient s ailment recovery in rehabilitation is expected to be managed well with the utilization of the back brace.  Upon fitting the brace the patient vocalized satisfaction with the fit and feel of the orthosis. The trimlines and widths of the brace presented satisfactory after the waist straps " were set to size in between 2 and 3.   Patient signed the delivery ticket and was given the virocyt receipt information sheet.    Goal:   The OTS spinal brace will be used to reduce pain by restricting mobility of the trunk and to facilitate healing following the injury to the spine and related soft tissue.     P:  Patient was given the verbal and written instructions on how to don/doff/care for the brace. Patient will utilize the orthosis for the duration of rehabilitation/PT in the hospital and at home activities upon discharge for the duration of treatment of patient ailment or until use of orthosis is no longer necessary or duration of the treatment or unless otherwise specified by the patient's provider. Will follow-up PRN.    Electronically signed by Luis ANDRADE.

## 2019-10-29 NOTE — PROGRESS NOTES
[facility]    Medicine Progress Note - Hospitalist Service       Date of Admission:  10/28/2019  Date of Service: 10/29/2019    Assessment & Plan   Bhavesh Schafer is a 88 year old male with a history of HTN, HLP and chronic back pain who was directly admitted from the clinic on 10/28/2019 for acute on chronic back pain due to     Acute on chronic back pain 2/2 multiple compression fractures of the lumbar spine   Recent fall  Patient had a fall approximately 10 days ago and since then has had worsening back pain.  He was initially seen in the clinic this AM and had a x-ray of the lumbar spine obtained.  This showed compression fractures at T12, L1, and L3.  These have been seen on previous CT scan of the abdomen in June but are possibly worsened.  He was sent in from the clinic as he was having issues managing at home  Plan:  - PRN Tylenol and Oxycodone and Flexeril  - MRI lumbar spine ordered and is pending.   - PT/OT/Social work consulted as patient may benefit from placement  - Orthopedic surgery consulted -> non-operative    HTN   HLP   - Will restart PTA regimen       Diet: Regular Diet Adult  Diet    DVT Prophylaxis: Low Risk/Ambulatory with no VTE prophylaxis indicated  [place] Catheter: in place, indication:    Code Status: DNR/DNI      Disposition Plan   Expected discharge: Tomorrow, recommended to prior living arrangement once adequate pain management/ tolerating PO medications.  Entered: Blayne Velasquez MD 10/29/2019, 3:14 PM       The patient's care was discussed with the Bedside Nurse and Patient.    Blayne Velasquez MD  Hospitalist Service  [facility]    ______________________________________________________________________    Interval History     Pain better, was able to ambulate  Awaiting MRI  No new numbness or tingling.   No CP/SOB. No fevers.     Data reviewed today: I reviewed all medications, new labs and imaging results over the last 24 hours. I personally reviewed no  images or EKG's today.    Physical Exam   Vital Signs: Temp: 98  F (36.7  C) Temp src: Oral BP: (!) 84/56 Pulse: 69   Resp: 16 SpO2: 95 % O2 Device: None (Room air)    Weight: 122 lbs 3.2 oz  General Appearance: Resting comfortably. NAD  Respiratory: Clear to auscultation.  No respiratory distress  Cardiovascular: RRR.  No murmurs  GI: Bowel sounds present.  Non-tender   Musculoskeletal: No edema.  No calf tenderness  Neurologic: No focal deficits.  Sensation intact.   Psychiatric: Alert.  Pleasant    Data   Recent Labs   Lab 10/28/19  2210 10/28/19  1256   WBC 9.2 9.4   HGB 11.8* 13.4   MCV 94 95    388   * 128*   POTASSIUM 3.9 4.5   CHLORIDE 96 92*   CO2 30 27   BUN 24 23   CR 1.09 0.97   ANIONGAP 4 9   GRAY 8.8 9.5   * 97     No results found for this or any previous visit (from the past 24 hour(s)).  Medications       lisinopril-hydrochlorothiazide  1 tablet Oral Daily

## 2019-10-29 NOTE — PLAN OF CARE
DATE & TIME: 10/29 AM                  Cognitive Concerns/ Orientation : A&Ox4, forgetful    BEHAVIOR & AGGRESSION TOOL COLOR: Green   CIWA SCORE: NA    ABNL VS/O2: VSS on RA, Soft BP after receiving scheduled dose of lisinopril   MOBILITY: SBA and walker   PAIN MANAGMENT: C/o Low back pain - PRN oxycodone 5mg given with relief   DIET: Regular   BOWEL/BLADDER: Continent - No BM this shift   ABNL LAB/BG: NA   DRAIN/DEVICES: No IV access   TELEMETRY RHYTHM: NA   SKIN: Bruised, scabs, abrasion to L elbow - mepilex in place   TESTS/PROCEDURES: MRI still to be done   D/C DAY/GOALS/PLACE: Discharge tomorrow afternoon   OTHER IMPORTANT INFO: Ortho surgery seen, no interventions at this time.     Observation goals PRIOR TO DISCHARGE     Comments: -diagnostic tests and consults completed and resulted: Not Met  -vital signs normal or at patient baseline:Met   -adequate pain control on oral analgesics: Met

## 2019-10-29 NOTE — PLAN OF CARE
PT:   Discharge Planner PT   Patient plan for discharge: Home  Current status: Pt admitted for back pain, imaging showing compression fractures T12, L1, L3, previously living alone in lower level duplex 4 steps with railing to enter, upstairs neighbors supportive and check in on pt regular, he has [place] of friends who assist with groceries and transportation. Pt completing bed mobility with mod indep, slow to come to EOB but able to complete w/o A. Sit to stand w/ SEC and SBA, slow to rise from seated position, impacted by pain, trial with WW, improved to mod indep. Pt ambulated with SEC 40 ft and sBA, back pain increasing, introduced WW and ambulated addition 150ft w less back pain and greater stability overly, mod indep. Pt navigated 4 steps with hand rail, SEC and min A, pt will have friends to assist him into home. No IP OT needs but would benefit from home OT. Will plan to issue WW from hospital.   Barriers to return to prior living situation: stairs to enter - friends will assist,  Recommendations for discharge: home with home PT, OT and home health; WW for mobility, may benefit from TLSO binder to help with back pain. Rec SW provide pt with [employer] info  Rationale for recommendations: Pt living alone and mostly independent with mobility and ADLS, could benefit from WW to improve stability with ambulation. Pt will benefit from home PT and OT for home safety assessment and HEP for strengthening.        Entered by: Taty Almazan 10/29/2019 10:07 AM

## 2019-10-29 NOTE — PLAN OF CARE
Admission    Patient arrives to room 619-1 via cart from ED.  Care plan note: Pt A&Ox4. Denies pain at rest, states he has some pain with movement. Left lateral knee, Lt elbow, Lt ankle abrasions that are scabbed.    Inpatient nursing criteria listed below were met:    PCD's Documented: Yes  Skin issues/needs documented :Yes  Isolation education started/completed NA  Patient allergies verified with patient: Yes  Verified completion of [place] Risk Assessment Tool:  No  Verified completion of Guardianship screening tool: No  Fall Prevention: Care plan updated, Education given and documented Yes  Care Plan initiated: Yes  Home medications documented in belongings flowsheet: Yes  Patient belongings documented in belongings flowsheet: Yes  Reminder note (belongings/ medications) placed in discharge instructions:Yes  Admission profile/ required documentation complete: Yes  Bedside Report Letter given and explained to patient Yes

## 2019-10-29 NOTE — DISCHARGE INSTRUCTIONS
A referral has been sent to [facility] for home RN, Occupational and Physical Therapy.  They will call you to schedule.  Their phone number is [phone].          Belongings:cane;cell phone and black ;clothing;glasses;shoes;wallet, black leather back pack. Medication sent to Security: Envelope # [id number]

## 2019-10-29 NOTE — H&P
[facility]    History and Physical - Hospitalist Service       Date of Admission:  10/28/2019    Assessment & Plan   hBavesh Schafer is a 88 year old male with a history of HTN, HLP and chronic back pain who was directly admitted from the clinic on 10/28/2019 for acute on chronic back pain due to     Acute on chronic back pain 2/2 multiple compression fractures of the lumbar spine   Recent fall  Patient had a fall approximately 10 days ago and since then has had worsening back pain.  He was initially seen in the clinic this AM and had a x-ray of the lumbar spine obtained.  This showed compression fractures at T12, L1, and L3.  These have been seen on previous CT scan of the abdomen in June but are possibly worsened.  He was sent in from the clinic as he was having issues managing at home  - Admission under observation status  - PRN Tylenol and Oxycodone  - MRI lumbar spine ordered to better visualize  - PT/OT/Social work consulted as patient may benefit from placement  - Orthopedic surgery consulted and appreciate their recommendations     HTN   HLP   - Will restart PTA regimen once confirmed by pharmacy        Diet: Regular Diet Adult    DVT Prophylaxis: Pneumatic Compression Devices  [place] Catheter: in place, indication:    Code Status: DNR/DNI    Disposition Plan   Expected discharge: 1-2 days, once pain controlled.  May need TCU  Entered: Leon Odell DO 10/28/2019, 8:16 PM     The patient's care was discussed with the Patient.    Leon Odell DO  [facility]    ______________________________________________________________________    Chief Complaint   Acute on chronic back pain    History is obtained from the patient    History of Present Illness   Bhavesh Schafer is a 88 year old male with known compression fractures of the lumbar spine who presented with lower back pain.  Approximately 10 days ago the patient had what sounded like a mechanical fall on to his back.   Since then he has had worsening pain to his lower back.  He describes it as an achy pain.  He rated the pain as a 9/10.  Pain is worsened with movement.  Was seen in clinic in this AM and had an x-ray of the lumbar spine.  This showed possible worsening of previously seen compression fractures and the patient was sent in for further evaluation.  He has generalized weakness but no focal weakness.  No numbness/tingling or incontinence.  Denies any fevers, chills, cough, chest pain, SOB, abdominal pain, N/V/D or other complaints.       Review of Systems    The 10 point Review of Systems is negative other than noted in the HPI    Past Medical History    I have reviewed this patient's medical history and updated it with pertinent information if needed.   Past Medical History:   Diagnosis Date     Essential hypertension, benign      Inguinal hernia without mention of obstruction or gangrene, unilateral or unspecified, (not specified as recurrent) 6/2013    RIght     Macular degeneration      Macular degeneration (senile) of retina, unspecified     bilat     Pure hypercholesterolemia      Retinal neovascularization NOS     L       Past Surgical History   I have reviewed this patient's surgical history and updated it with pertinent information if needed.  Past Surgical History:   Procedure Laterality Date     HC REMOVE TONSILS/ADENOIDS,<13 Y/O  age 8     HC REPAIR ING HERNIA 5+ YO,BALTAZAR  8/04    L, recurrent with small bowel incarceration     HC REPAIR ING HERNIA,5+Y/O,REDUCIBL      L     HC TOOTH EXTRACTION W/FORCEP       LAPAROSCOPIC HERNIORRHAPHY INGUINAL  7/12/2013    Procedure: LAPAROSCOPIC HERNIORRHAPHY INGUINAL;  Laparoscopic Right Inguinal Hernia Repair with Mesh ;  Surgeon: Martin Quinteros MD;  Location:  OR     LAPAROSCOPIC HERNIORRHAPHY INGUINAL BILATERAL Left 10/11/2019    Procedure: LAPAROSCOPIC LEFT INGUINAL HERNIA REPAIR WITH MESH POSSIBLE OPEN;  Surgeon: Tariq Garcia MD;  Location:  OR      LASER SURGERY OF EYE      L retina laser therapy for neovascular membrane       Social History   I have reviewed this patient's social history and updated it with pertinent information if needed.  Social History     Tobacco Use     Smoking status: Former Smoker     Packs/day: 0.00     Last attempt to quit: 3/5/1979     Years since quittin.6     Smokeless tobacco: Never Used   Substance Use Topics     Alcohol use: No     Comment: rare red wine     Drug use: No       Family History   I have reviewed this patient's family history and updated it with pertinent information if needed.   Family History   Problem Relation Age of Onset     Cancer Mother         colon     Cerebrovascular Disease Father         ruptured anuerysm     Hypertension Father      Prior to Admission Medications   Prior to Admission Medications   Prescriptions Last Dose Informant Patient Reported? Taking?   acetaminophen (TYLENOL) 325 MG tablet   No No   Sig: Take 1-2 tablets (325-650 mg) by mouth every 6 hours as needed for pain   cyclobenzaprine (FLEXERIL) 5 MG tablet   No No   Sig: Take 1 tablet (5 mg) by mouth 2 times daily as needed (muscle pain)   lisinopril-hydrochlorothiazide (PRINZIDE/ZESTORETIC) 20-25 MG tablet   No No   Sig: Take 1 tablet by mouth daily   senna-docusate (SENOKOT-S/PERICOLACE) 8.6-50 MG tablet   No No   Sig: Take 1-2 tablets by mouth 2 times daily   tamsulosin (FLOMAX) 0.4 MG capsule   No No   Sig: Take 1 capsule (0.4 mg) by mouth daily      Facility-Administered Medications: None     Allergies   Allergies   Allergen Reactions     Atenolol Swelling     Fast pulse. Patient does not remember getting this medication.       Physical Exam   Vital Signs: Temp: 97.7  F (36.5  C) Temp src: Oral BP: 128/88 Pulse: 83   Resp: 18 SpO2: 90 % O2 Device: None (Room air)    Weight: 0 lbs 0 oz    General Appearance: Resting comfortably. NAD  Eyes: EOMI.  Normal conjunctiva  HEENT: NC/AT.  Slightly dry mucous  membranes  Respiratory: Clear to auscultation.  No respiratory distress  Cardiovascular: RRR.  No murmurs  GI: Bowel sounds present.  Non-tender   Skin: No obvious rashes.  No cyanosis  Musculoskeletal: No edema.  No calf tenderness  Neurologic: No focal deficits.  Sensation intact.  CN appear intact  Psychiatric: Alert.  Pleasant    Data   Data reviewed today: I reviewed all medications, new labs and imaging results over the last 24 hours. I personally reviewed   X-rays of the lumbar spine.    Recent Labs   Lab 10/28/19  1256   WBC 9.4   HGB 13.4   MCV 95        Recent Results (from the past 24 hour(s))   XR Lumbar Spine 2/3 Views    Narrative    XR LUMBAR SPINE TWO-THREE VIEWS   10/28/2019 1:06 PM     HISTORY: Lumbosacral pain.    COMPARISON: Abdomen and pelvis CT 6/20/2019    FINDINGS: There is sacralization of the L5 vertebra. Diffuse osseous  demineralization is present. Multiple compression fractures are  present which are poorly characterized due to overlapping bowel gas on  the lateral view. Compression fractures are present involving  approximately T12, L1, and L3. T12 compression fracture appears worse  compared to 6/20/2019. Alignment is significant for dextroconvex  scoliosis. Multilevel moderate to severe degenerative disc disease is  present. Multilevel moderate to severe facet arthropathy is present.      Impression    IMPRESSION: Multiple compression fractures, possibly worse compared to  6/20/2019 abdomen pelvis CT. Consider CT or MRI.    ÁNGELA SEGOVIA MD

## 2019-10-29 NOTE — PLAN OF CARE
Patient plan for discharge: Home  Current status: Pt admitted under observation for back pain, imaging showing compression fractures T12, L1, L3. Pt demonstrated mod I with FWW for functional transfers/mobility.   Barriers to return to prior living situation: stairs to enter - friends will assist  Recommendations for discharge: Defer to PT note.   Rationale for recommendations: Pt living alone and mostly independent with mobility and ADLS, could benefit from WW to improve stability with ambulation. Pt will benefit from home PT and OT for home safety assessment and ongoing intervention.

## 2019-10-29 NOTE — PROGRESS NOTES
Observation goals PRIOR TO DISCHARGE     Comments: -diagnostic tests and consults completed and resulted: Not Met  -vital signs normal or at patient baseline:Met   -adequate pain control on oral analgesics:Not Met

## 2019-10-30 NOTE — PROGRESS NOTES
A voice mail was left on our office phone after hours yesterday to adjust the TLSO brace.  I called floor 6 and no one knows what needs to be adjusted.  I was told it doesn't fit.  When I asked what doesn't fit they don't know.  I had a patient not show up to their scheduled appointment at our office which allowed me to come over on such short notice to adjust the brace otherwise I would not have been able to make it in time for when they said he is discharging at 11:00am this morning.      I met with the patient in room 605-02 and he is sitting up in bed about to start eating breakfast.  I donned the brace and it fits well just like yesterday.      I doffed the brace to let the patient eat his breakfast.      The brace instructions are still in the patient's folder from yesterday.      Luis ANDRADE

## 2019-10-30 NOTE — PROGRESS NOTES
SW  D:  Acknowledging SW consult.  Care Transition Assessment completed by RN.  She did not identify needs for SW discipline.  Consult will be closed.

## 2019-10-30 NOTE — PLAN OF CARE
Physical Therapy Discharge Summary    Reason for therapy discharge:    Discharged to home with home therapy.    Progress towards therapy goal(s). See goals on Care Plan in [facility] electronic health record for goal details.  Goals not met.  Barriers to achieving goals:   discharge from facility.    Therapy recommendation(s):    Continued therapy is recommended.  Rationale/Recommendations:  Pt living alone and mostly independent with mobility and ADLS, could benefit from WW to improve stability with ambulation. Pt will benefit from home PT and OT for home safety assessment and HEP for strengthening. .

## 2019-10-30 NOTE — DISCHARGE SUMMARY
[facility]  Hospitalist Discharge Summary       Date of Admission:  10/28/2019  Date of Discharge:  10/30/2019  Discharging Provider: Blayne Velasquez MD      Discharge Diagnoses     Acute on chronic back pain 2/2 multiple compression fractures of the lumbar spine     Follow-ups Needed After Discharge   Follow-up Appointments     Follow-up and recommended labs and tests       Follow up with primary care provider, Eugene Maki, as   scheduled for you on Monday Nov 04, 2019 at  2:30 PM, for hospital follow-   up.  The following labs/tests are recommended: None.           Unresulted Labs Ordered in the Past 30 Days of this Admission     No orders found from 9/28/2019 to 10/29/2019.        Discharge Disposition   Admited to home care:   Agency: [facility]  Discharged to home  Condition at discharge: Stable    Hospital Course   Bhavesh Schafer is a 88 year old male with a history of HTN, HLP and chronic back pain who was directly admitted from the clinic on 10/28/2019 for acute on chronic back pain due to     Acute on chronic back pain 2/2 multiple compression fractures of the lumbar spine   Recent fall  Patient had a fall approximately 10 days ago and since then has had worsening back pain.  He was initially seen in the clinic this AM and had a x-ray of the lumbar spine obtained.  This showed compression fractures at T12, L1, and L3.  These have been seen on previous CT scan of the abdomen in June but are possibly worsened.  He was sent in from the clinic as he was having issues managing at home. MRI shows acute compression fracture of T12 without any posterior protrusion, appears to be a benign compression fracture as no evidence for any involvement of the pedicles.  Plan:  - PRN Tylenol and Oxycodone and Flexeril  - Orthopedic surgery consulted -> non-operative    HTN   HLP   - Will resume PTA regimen    Consultations This Hospital Stay   SOCIAL WORK IP CONSULT  PHYSICAL THERAPY ADULT IP  CONSULT  OCCUPATIONAL THERAPY ADULT IP CONSULT  ORTHOPEDIC SURGERY IP CONSULT  ORTHOSIS BRACE IP CONSULT  CARE TRANSITION RN/SW IP CONSULT    Code Status   DNR/DNI    Time Spent on this Encounter   I, Blayne Velasquez MD, personally saw the patient today and spent greater than 30 minutes discharging this patient.       Blayne Velasquez MD  [facility]  ______________________________________________________________________    Physical Exam   Vital Signs: Temp: 98.7  F (37.1  C) Temp src: Oral BP: 121/72 Pulse: 66   Resp: 16 SpO2: 95 % O2 Device: None (Room air)    Weight: 122 lbs 3.2 oz       Primary Care Physician   Eugene Maki    Discharge Orders      Home Care PT Referral for Hospital Discharge      Home Care OT Referral for Hospital Discharge      Home care nursing referral      Reason for your hospital stay    You had back pain and needed imaging study     Follow-up and recommended labs and tests     Follow up with primary care provider, Eugene Maki, as scheduled for you on Monday Nov 04, 2019 at  2:30 PM, for hospital follow- up.  The following labs/tests are recommended: None.     Activity    Your activity upon discharge: activity as tolerated     MD face to face encounter    Documentation of Face to Face and Certification for Home Health Services    I certify that patient: Bhavesh Schafer is under my care and that I, or a nurse practitioner or physician's assistant working with me, had a face-to-face encounter that meets the physician face-to-face encounter requirements with this patient on: 10/30/19  .    This encounter with the patient was in whole, or in part, for the following medical condition, which is the primary reason for home health care: physical deconditioning.    I certify that, based on my findings, the following services are medically necessary home health services: Occupational Therapy and Physical Therapy.    My clinical findings support the need for the above  services because: Occupational Therapy Services are needed to assess and treat cognitive ability and address ADL safety due to impairment in physical deconditioning. and Physical Therapy Services are needed to assess and treat the following functional impairments: physical deconditioning.    Further, I certify that my clinical findings support that this patient is homebound (i.e. absences from home require considerable and taxing effort and are for medical reasons or [facility] services or infrequently or of short duration when for other reasons) because: Requires assistance of another person or specialized equipment to access medical services because patient: Has prohibitive pain during ambulation...    Based on the above findings. I certify that this patient is confined to the home and needs intermittent skilled nursing care, physical therapy and/or speech therapy.  The patient is under my care, and I have initiated the establishment of the plan of care.  This patient will be followed by a physician who will periodically review the plan of care.  Physician/Provider to provide follow up care: Eugene Maki    Attending hospital physician (the Medicare certified [place] provider): Blayne Velasquez MD  Physician Signature: See electronic signature associated with these discharge orders.  Date: 10/30/2019     Diet    Follow this diet upon discharge: Orders Placed This Encounter      Regular Diet Adult       Significant Results and Procedures   Most Recent 3 CBC's:  Recent Labs   Lab Test 10/28/19  2210 10/28/19  1256 09/26/19  1354 01/29/18  1114   WBC 9.2 9.4  --  6.1   HGB 11.8* 13.4 12.6* 13.1*   MCV 94 95  --  93    388  --  261     Most Recent 3 BMP's:  Recent Labs   Lab Test 10/28/19  2210 10/28/19  1256 09/26/19  1354   * 128* 136   POTASSIUM 3.9 4.5 4.5   CHLORIDE 96 92* 102   CO2 30 27 24   BUN 24 23 34*   CR 1.09 0.97 1.01   ANIONGAP 4 9 10   GRAY 8.8 9.5 9.1   * 97 84   ,   Results for  orders placed or performed during the hospital encounter of 10/28/19   MR Lumbar Spine w/o Contrast    Narrative    MR LUMBAR SPINE WITHOUT CONTRAST 10/29/2019 3:59 PM     HISTORY: Abnormal x-ray, lumbosacral spine, bone destruction.    TECHNIQUE: Multiplanar multisequence images were obtained through the  lumbar spine without contrast.    COMPARISON: Plain radiographs dated 10/28/2019.    FINDINGS: Five lumbar-type vertebral bodies are presumed. Posterior  alignment is normal. There is an acute compression fracture of the T12  vertebral body without any evidence for posterior protrusion on this  exam. There is an old compression deformity of the superior endplate  of L3 and an old minimally wedged anterior compression fracture at L1.  The conus medullaris is normal in appearance with the tip at the L2  level. Cauda equina nerve roots are normal.    T12-L1: Minimal posterior osteophyte formation facet hypertrophy is  present. There is no stenosis.    L1-L2: Posterior osteophyte formation and facet hypertrophy is present  causing some moderate bilateral neural foraminal stenosis but no  central canal stenosis.    L2-L3: Posterior osteophyte formation disc bulge and facet hypertrophy  is present but there is no stenosis.    L3-L4: Broad-based disc bulging and mild to moderate facet and  ligament flavum hypertrophy is present causing some mild to moderate  bilateral neural foraminal stenosis but no central canal stenosis.    L4-L5: Minimal disc bulge and facet hypertrophy. No stenosis.    L5-SI: Disc space narrowing is present with disc signal suggesting  that this disc may be fused. There is no significant stenosis.      Impression    IMPRESSION:  1. Acute compression fracture of T12 without any posterior protrusion.  This appears to be a benign compression fracture as no evidence for  any involvement of the pedicles.  2. Old compression deformities of L1 and L3.  3. Multilevel degenerative changes.    ROBE PARIKH MD        Discharge Medications   Current Discharge Medication List      START taking these medications    Details   !! order for DME Equipment being ordered: Walker Wheels () and Walker ()  Treatment Diagnosis: difficulty walking  Qty: 1 each, Refills: 0    Associated Diagnoses: Closed compression fracture of lumbosacral spine with routine healing, subsequent encounter      !! order for DME Equipment being ordered: Walker Wheels () and Walker ()  Treatment Diagnosis: difficulty walking  Qty: 1 each, Refills: 0    Associated Diagnoses: Closed compression fracture of lumbosacral spine with routine healing, subsequent encounter      oxyCODONE (ROXICODONE) 5 MG tablet Take 1 tablet (5 mg) by mouth every 4 hours as needed for moderate to severe pain  Qty: 20 tablet, Refills: 0    Associated Diagnoses: Closed compression fracture of lumbosacral spine with routine healing, subsequent encounter       !! - Potential duplicate medications found. Please discuss with provider.      CONTINUE these medications which have CHANGED    Details   cyclobenzaprine (FLEXERIL) 5 MG tablet Take 1 tablet (5 mg) by mouth 2 times daily as needed (muscle pain)  Qty: 15 tablet, Refills: 0    Associated Diagnoses: Lumbosacral pain         CONTINUE these medications which have NOT CHANGED    Details   acetaminophen (TYLENOL) 325 MG tablet Take 1-2 tablets (325-650 mg) by mouth every 6 hours as needed for pain    Associated Diagnoses: Inguinal hernia without obstruction or gangrene, recurrence not specified, unspecified laterality      lisinopril-hydrochlorothiazide (PRINZIDE/ZESTORETIC) 20-25 MG tablet Take 1 tablet by mouth daily  Qty: 90 tablet, Refills: 3    Associated Diagnoses: Essential hypertension with goal blood pressure less than 140/90      polyethylene glycol (MIRALAX/GLYCOLAX) packet Take 1 packet by mouth daily      psyllium (METAMUCIL/KONSYL) Packet Take 1 packet by mouth daily           Allergies   Allergies    Allergen Reactions     Atenolol Swelling     Fast pulse. Patient does not remember getting this medication.

## 2019-10-30 NOTE — PROGRESS NOTES
[facility]  Met with pt to discuss plans for HC.  Pt to be discharged home today 10/30/19 and has agreed to have [facility] follow with services of SN, PT and OT. Patient care support center processing referral.  Pt verbalized understanding that initial visit is scheduled for 10/31 or 11/1. Pt has 24 hour phone number for [facility] for any questions or concerns.

## 2019-10-30 NOTE — PLAN OF CARE
DATE & TIME: 10/29/2019 [id number]    Cognitive Concerns/ Orientation : A&O x4- forgetful   BEHAVIOR & AGGRESSION TOOL COLOR: Green  CIWA SCORE: NA   ABNL VS/O2: VSS on RA  MOBILITY: Ax1 w/ walker & gate belt   PAIN MANAGMENT: back pain rated at 6- PRN oxy given 1x  DIET: Regular   BOWEL/BLADDER: continent of B/B, up to BRM  ABNL LAB/BG: NA  DRAIN/DEVICES: NA  TELEMETRY RHYTHM: NA  SKIN: WDL  TESTS/PROCEDURES: MRI today  D/C DAY/GOALS/PLACE:pending progress; discharge tomorrow am.   OTHER IMPORTANT INFO: ortho signed off, brace in room- technician will come in am to re-fit. walker @ nurses station.

## 2019-10-30 NOTE — PLAN OF CARE
DATE & TIME: 10/30/19 Night shift   Cognitive Concerns/ Orientation : alert and oriented, forgetful   BEHAVIOR & AGGRESSION TOOL COLOR: GREEN  CIWA SCORE: NA   ABNL VS/O2: WDL on RA  MOBILITY: SBA walker/GB  PAIN MANAGMENT: none needed, denies pain  DIET: Regular  BOWEL/BLADDER: continent, no BM this shift. Complains of not emptying his bladder fully. Bladder scanned him after voiding about 100 ml and he had 78 ml in bladder. Patient strains to void and has a lot of hesitancy. Wants MD to start him on Flomax.   ABNL LAB/BG: Na improved from 128 to 130  DRAIN/DEVICES: none  TELEMETRY RHYTHM: NA  SKIN: bronze, scabs on L knee; elbow  TESTS/PROCEDURES: none  D/C DAY/GOALS/PLACE: Patient is planning on leaving this AM before 11am. VM was left to Orthotics to come and re-fit patient for brace this AM before 1030. Walker for patient to discharge with is at the .   OTHER IMPORTANT INFO: Patient really likes his privacy and does not like the bed alarm.

## 2019-10-30 NOTE — PLAN OF CARE
.DATE & TIME: 10/30 ([id number])    Cognitive Concerns/ Orientation : A&Ox4, forgetful   BEHAVIOR & AGGRESSION TOOL COLOR: Green  CIWA SCORE: N/aq   ABNL VS/O2: VSS  MOBILITY: SBA with walker  PAIN MANAGMENT: Denies  DIET: Regular  BOWEL/BLADDER: Continent  ABNL LAB/BG: WNL  DRAIN/DEVICES: IV discontinued  TELEMETRY RHYTHM: N/a  SKIN: WNL  TESTS/PROCEDURES: N/a  D/C DAY/GOALS/PLACE: Today  OTHER IMPORTANT INFO: Reviewed discharge instructions with pt with understanding, prescriptions given.  Belongings given at discharge.  Walker given.

## 2019-10-30 NOTE — PROGRESS NOTES
.Discharge    Patient discharged to home via car with friend  Care plan note  completed    Listed belongings gathered and returned to patient. Yes  Care Plan and Patient education resolved: Yes  Prescriptions if needed, hard copies sent with patient  Yes  Home and hospital acquired medications returned to patient: Yes  Medication Bin checked and emptied on discharge Yes  Follow up appointment made for patient: Yes

## 2019-10-31 NOTE — PROGRESS NOTES
Clinic Care Coordination Contact  Care Coordination Communication         Clinical Data: Patient was hospitalized at [facility]    Date of Admission:  10/28/2019  Date of Discharge:  10/30/2019  Discharge Diagnoses     Acute on chronic back pain 2/2 multiple compression fractures of the lumbar spine    Follow-ups Needed After Discharge     Follow-up Appointments     Follow-up and recommended labs and tests       Follow up with primary care provider, Eugene Maki, as   scheduled for you on Monday Nov 04, 2019 at  2:30 PM, for hospital follow-up   Home Care Contact:              Home Care Agency: [facility]               Contact name () and phone number: Aniyah Elias [phone]                Plan: No clinic care coordination outreach at this time.

## 2019-10-31 NOTE — TELEPHONE ENCOUNTER
Hospital F/U 10/30/2019 DX:Closed Compression Fracture Of Lumbosacral Spine With Routine Healing, Subsequent Encounter ED/IP 0/0    [phone] (home)

## 2019-11-04 NOTE — TELEPHONE ENCOUNTER
Reason for Call:  Other prescription    Detailed comments: Tin, pharmacist from [facility] calling in to get clarification on med. They need to know if the DX is for acute or chronic.    Please call back at [phone]    Phone Number Patient can be reached at: Cell number on file:    Telephone Information:   Mobile [phone]       Best Time: any    Can we leave a detailed message on this number? YES    Call taken on 11/4/2019 at 3:38 PM by Ji Kaminski

## 2019-11-04 NOTE — TELEPHONE ENCOUNTER
JF,   FYI:  Pharmacist may not be able to dispense full 30 pills since this Rx is for fracture (acute pain)  Will only be able to give 7 days or #28  Ivelisse TRAORE RN

## 2019-11-04 NOTE — PROGRESS NOTES
Bhavesh Schafer is a 88 year old male who presents to clinic today for the following health issues:      Hospital Follow-up Visit:    Hospital/Nursing Home/IP Rehab Facility: [facility]  Date of Admission: 10/28/2019  Date of Discharge: 10/30/2019  Reason(s) for Admission: acute chronic pain             Problems taking medications regularly:  None       Medication changes since discharge: None       Problems adhering to non-medication therapy:  None    Summary of hospitalization:  [facility] discharge summary reviewed  Diagnostic Tests/Treatments reviewed.  Follow up needed: none  Other Healthcare Providers Involved in Patient s Care:         None  Update since discharge: fluctuating course.   Post Discharge Medication Reconciliation: discharge medications reconciled and changed, per note/orders (see AVS).  Plan of care communicated with patient     Coding guidelines for this visit:  Type of Medical   Decision Making Face-to-Face Visit       within 7 Days of discharge Face-to-Face Visit        within 14 days of discharge   Moderate Complexity [id number]   High Complexity  95772          t12 compression fracture, was hospitalized briefly    Has been taking oxycodone 5mg about every 4-6 hours    Getting some urinary retention a little bit  Has some tamsulosin, has not been taking it  Didn't feel like he needed it before    Feeling constipated, opiates    Social: The patient lives alone. He relies on his friends for transportation.     Patient uses a cane of ambulation.     Patient Active Problem List   Diagnosis     Macular degeneration (senile) of retina     Retinal neovascularization     Persistent insomnia     HYPERLIPIDEMIA LDL GOAL <130     Hypertension goal BP (blood pressure) < 140/90     Iron deficiency anemia     Advanced directives, counseling/discussion     Right inguinal hernia     Acute urinary retention     History of depression     Other closed nondisplaced fracture of proximal  "end of left humerus, initial encounter     Left inguinal hernia     Inguinal hernia     Closed compression fracture of lumbosacral spine (H)     Past Surgical History:   Procedure Laterality Date     HC REMOVE TONSILS/ADENOIDS,<11 Y/O  age 8     HC REPAIR ING HERNIA 5+ YO,BALTAZAR      L, recurrent with small bowel incarceration     HC REPAIR ING HERNIA,5+Y/O,REDUCIBL      L     HC TOOTH EXTRACTION W/FORCEP       LAPAROSCOPIC HERNIORRHAPHY INGUINAL  2013    Procedure: LAPAROSCOPIC HERNIORRHAPHY INGUINAL;  Laparoscopic Right Inguinal Hernia Repair with Mesh ;  Surgeon: Martin Quinteros MD;  Location:  OR     LAPAROSCOPIC HERNIORRHAPHY INGUINAL BILATERAL Left 10/11/2019    Procedure: LAPAROSCOPIC LEFT INGUINAL HERNIA REPAIR WITH MESH POSSIBLE OPEN;  Surgeon: Tariq Garcia MD;  Location:  OR     LASER SURGERY OF EYE  8/04    L retina laser therapy for neovascular membrane       Social History     Tobacco Use     Smoking status: Former Smoker     Packs/day: 0.00     Last attempt to quit: 3/5/1979     Years since quittin.6     Smokeless tobacco: Never Used   Substance Use Topics     Alcohol use: No     Comment: rare red wine     Family History   Problem Relation Age of Onset     Cancer Mother         colon     Cerebrovascular Disease Father         ruptured anuerysm     Hypertension Father            Reviewed and updated as needed this visit by Provider         Review of Systems   ROS COMP: Constitutional, HEENT, cardiovascular, pulmonary, gi and gu systems are negative, except as otherwise noted.      Objective    /77   Pulse 78   Temp 97.8  F (36.6  C) (Oral)   Resp 18   Ht 1.702 m (5' 7\")   Wt 59 kg (130 lb)   SpO2 95%   BMI 20.36 kg/m    Body mass index is 20.36 kg/m .  Physical Exam   GENERAL: healthy, alert and no distress  MS: . Point tenderness to palpation in the lower thoracic and upper lumbar area. ROM is restricted by pain.           Assessment & Plan       " ICD-10-CM    1. Closed compression fracture of lumbosacral spine with routine healing, subsequent encounter S32.000D oxyCODONE (ROXICODONE) 5 MG tablet   2. Acute urinary retention R33.8    3. Drug-induced constipation K59.03      The patient presented to clinic for severe lower back pain, found to hve new T12 compression fracture  Pain control with opiates for now and tylenol    They sent him home with pain medication    Start Senna for constipation    phone f/u in 1 week    Counselled on Diagnosis, medications, adjunct treatment and appropriate follow up. Couns time: 25 minutes of 40 minutes total face to face time

## 2019-11-04 NOTE — NURSING NOTE
"Chief Complaint   Patient presents with     Hospital F/U     /77   Pulse 78   Temp 97.8  F (36.6  C) (Oral)   Resp 18   Ht 1.702 m (5' 7\")   Wt 59 kg (130 lb)   SpO2 95%   BMI 20.36 kg/m   Estimated body mass index is 20.36 kg/m  as calculated from the following:    Height as of this encounter: 1.702 m (5' 7\").    Weight as of this encounter: 59 kg (130 lb).  bp completed using cuff size: regular       Health Maintenance addressed:  NONE    n/a    Chel Barlow CMA, MA     "

## 2019-11-05 NOTE — PROGRESS NOTES
[facility] now requests orders and shares plan of care/discharge summaries for some patients through [facility].  Please REPLY TO THIS MESSAGE OR ROUTE BACK TO THE AUTHOR in order to give authorization for orders when needed.  This is considered a verbal order, you will still receive a faxed copy of orders for signature.  Thank you for your assistance in improving collaboration for our patients.    ORDER: Home PT 1w1, 2w2 for strength, balance, and safety with mobility.     Krystin Cuello, PT, DPT  [email or web address]  [phone]

## 2019-11-06 NOTE — TELEPHONE ENCOUNTER
Reason for Call:  Home Health Care      [facility] (reason for call): orders    Orders are needed for this patient.     PT:     OT: 1 x1, 2 x 2, 1 x 1 DME needs, adl's safety, and cognition     Skilled Nursing:       Pt Provider: Dr. Maki    Phone Number Homecare Nurse can be reached at: [phone]    Can we leave a detailed message on this number? YES    Phone number patient can be reached at: na    Best Time: any    Call taken on 11/6/2019 at 8:59 AM by Luisa Crabtree

## 2019-11-06 NOTE — TELEPHONE ENCOUNTER
LUIS ANTONIO,   KEEGAN:  Gave verbal ok for homecare orders as listed below.  Ivelisse TRAORE RN

## 2019-11-11 NOTE — TELEPHONE ENCOUNTER
Received form(s) from [facility] for OT 1 week w, 2 week 2, 1 week 1.  Placed form(s) in/on JF's desk.  Forms need to be signed and faxed to number listed on form..    Call pt to verify form was sent: no  Copy needs to be sent for scanning after completion: Yes

## 2019-11-18 NOTE — TELEPHONE ENCOUNTER
Reason for Call:  Other     Detailed comments: Aniyah with  home care calling to speak with a nurse regarding pain management for this patient.  Pt has been taking 5 mg of oxycodone bid and Aniyah would like to know if Dr. Maki will prescribe additional oxycodone.  Aniyah is also looking to have Advil prescribed to the patient to be taken 1-2 times a day to help wean  off the oxycodone          [id number] [address]      Phone Number Patient can be reached at: Aniyah  home care RN- [phone]    Best Time: any    Can we leave a detailed message on this number? YES    Call taken on 11/18/2019 at 4:34 PM by Luisa Crabtree

## 2019-11-19 NOTE — TELEPHONE ENCOUNTER
LUIS ANTONIO Dill home care nurse called.  States patient has 6 Oxycodone left, down to taking  2 daily.    Asking if you want to see him for follow up/further refills of Oxycodone and if you would prescribe Advil take 1-2 times daily to help wean off Oxycodone.    Please advise.  Thanks,  Bernarda Taylor RN

## 2019-11-22 NOTE — TELEPHONE ENCOUNTER
Received form(s) from home [facility] for ibuprofen.  Placed form(s) in/on JF's desk.  Forms need to be signed and faxed to number listed on form..    Call pt to verify form was sent: No  Copy needs to be sent for scanning after completion: Yes

## 2019-12-03 NOTE — PROGRESS NOTES
[facility]      OUTPATIENT PHYSICAL THERAPY EVALUATION  PLAN OF TREATMENT FOR OUTPATIENT REHABILITATION  (COMPLETE FOR INITIAL CLAIMS ONLY)  Patient's Last Name, First Name, M.I.  YOB: 1931  Bhavesh Schafer                        Provider's Name  [facility] Medical Record No.  [id number]                               Onset Date:  10/28/19   Start of Care Date:  10/28/19      Type:     _X_PT   ___OT   ___SLP Medical Diagnosis:  back pain                        PT Diagnosis:  difficulty walking   Visits from SOC:  1   _________________________________________________________________________________  Plan of Treatment/Functional Goals    Planned Interventions:  ,    bed mobility training, gait training, transfer training, Inst in using WW     Goals: See Physical Therapy Goals on Care Plan in [employer] electronic health record.    Therapy Frequency: Daily  Predicted Duration of Therapy Intervention: 2 days  _________________________________________________________________________________    I CERTIFY THE NEED FOR THESE SERVICES FURNISHED UNDER        THIS PLAN OF TREATMENT AND WHILE UNDER MY CARE     (Physician co-signature of this document indicates review and certification of the therapy plan).                Certification date from: 10/29/19, Certification date to: 11/01/19    Referring Physician: Jose R            Initial Assessment        See Physical Therapy evaluation dated 10/28/19 in Epic electronic health record.

## 2019-12-03 NOTE — TELEPHONE ENCOUNTER
"Requested Prescriptions   Pending Prescriptions Disp Refills     ibuprofen (ADVIL/MOTRIN) 600 MG tablet [Pharmacy Med Name: Ibuprofen Oral  Last Written Prescription Date:  11/19/19  Last Fill Quantity: 30,  # refills: 0   Last Office Visit: 11/4/2019   Future Office Visit:      Tablet 600 MG] 30 tablet 0     Sig: Take 1 tablet (600 mg) by mouth every 8 hours as needed for moderate pain       NSAID Medications Failed - 12/3/2019  2:21 PM        Failed - Normal ALT on file in past 12 months     Recent Labs   Lab Test 01/29/18  1114   ALT 13           Failed - Normal AST on file in past 12 months     Recent Labs   Lab Test 01/29/18  1114   AST 18           Failed - Patient is age 6-64 years        Failed - Normal CBC on file in past 12 months     Recent Labs   Lab Test 10/28/19  2210   WBC 9.2   RBC 3.76*   HGB 11.8*   HCT 35.2*              Passed - Blood pressure under 140/90 in past 12 months     BP Readings from Last 3 Encounters:   11/04/19 120/77   10/30/19 121/72   10/28/19 116/73           Passed - Recent (12 mo) or future (30 days) visit within the authorizing provider's specialty     Patient has had an office visit with the authorizing provider or a provider within the authorizing providers department within the previous 12 mos or has a future within next 30 days. See \"Patient Info\" tab in inbasket, or \"Choose Columns\" in Meds & Orders section of the refill encounter.            Passed - Medication is active on med list        Passed - Normal serum creatinine on file in past 12 months     Recent Labs   Lab Test 10/28/19  2210  06/20/19  1135   CR 1.09   < >  --    CREAT  --   --  1.0    < > = values in this interval not displayed.               "

## 2019-12-03 NOTE — TELEPHONE ENCOUNTER
Routing refill request to provider for review/approval because:  Labs out of range:  CBC  Labs not current:  ALT and AST needed yearly   Ivelisse TRAORE RN

## 2019-12-06 NOTE — TELEPHONE ENCOUNTER
OXYCODONE  Last Written Prescription Date:  11/19/19  Last Fill Quantity: 14,   # refills: 0  Last Office Visit: 11/04/19  Future Office visit:       Routing refill request to provider for review/approval because:  Drug not on the [facility], P or [facility] refill protocol or controlled substance

## 2019-12-09 NOTE — TELEPHONE ENCOUNTER
Note from last refill:  Advil take 1-2 times daily to help wean off Oxycodone.    VM left asking home care nurse Aniyah to call back ([phone])..  Bernarda Taylor RN

## 2019-12-16 NOTE — TELEPHONE ENCOUNTER
Reason for Call:  Medication or medication refill:    Do you use a [facility]?  Name of the pharmacy and phone number for the current request:      [id number] [address]    Name of the medication requested: Oxycodone    Other request: Bhavesh calling in to get more fills on oxycodone. Looks like he's supposed to be wean off of Oxycodone & told him that but he says that he's in a lot of pain from his fall a few weeks ago with back pains.     Please call him to discuss.      Can we leave a detailed message on this number? YES    Phone number patient can be reached at: Cell number on file:    Telephone Information:   Mobile [phone]       Best Time: any    Call taken on 12/16/2019 at 11:56 AM by Ji Kaminski

## 2019-12-16 NOTE — TELEPHONE ENCOUNTER
JF,   Please see below message  Pt requesting more Oxycodone  Do you want to see pt now?  Last OV 11/4/2019  Reviewed [facility]NP[facility]  Last filled 11/19/2019 #14  No concerns/outside fills noted  Ivelisse TRAORE RN

## 2020-01-01 ENCOUNTER — HOSPITAL ENCOUNTER (INPATIENT)
Facility: CLINIC | Age: 85
LOS: 4 days | DRG: 871 | End: 2020-03-06
Attending: EMERGENCY MEDICINE | Admitting: INTERNAL MEDICINE
Payer: MEDICARE

## 2020-01-01 ENCOUNTER — APPOINTMENT (OUTPATIENT)
Dept: GENERAL RADIOLOGY | Facility: CLINIC | Age: 85
DRG: 871 | End: 2020-01-01
Payer: MEDICARE

## 2020-01-01 ENCOUNTER — APPOINTMENT (OUTPATIENT)
Dept: GENERAL RADIOLOGY | Facility: CLINIC | Age: 85
DRG: 871 | End: 2020-01-01
Attending: SURGERY
Payer: MEDICARE

## 2020-01-01 ENCOUNTER — APPOINTMENT (OUTPATIENT)
Dept: GENERAL RADIOLOGY | Facility: CLINIC | Age: 85
DRG: 871 | End: 2020-01-01
Attending: INTERNAL MEDICINE
Payer: MEDICARE

## 2020-01-01 ENCOUNTER — APPOINTMENT (OUTPATIENT)
Dept: CT IMAGING | Facility: CLINIC | Age: 85
DRG: 871 | End: 2020-01-01
Attending: EMERGENCY MEDICINE
Payer: MEDICARE

## 2020-01-01 VITALS
TEMPERATURE: 98.8 F | SYSTOLIC BLOOD PRESSURE: 122 MMHG | OXYGEN SATURATION: 96 % | HEART RATE: 74 BPM | RESPIRATION RATE: 33 BRPM | DIASTOLIC BLOOD PRESSURE: 72 MMHG | WEIGHT: 139.99 LBS | BODY MASS INDEX: 21.93 KG/M2

## 2020-01-01 DIAGNOSIS — K56.609 SMALL BOWEL OBSTRUCTION (H): ICD-10-CM

## 2020-01-01 DIAGNOSIS — R79.89 ELEVATED LACTIC ACID LEVEL: ICD-10-CM

## 2020-01-01 DIAGNOSIS — R57.1 HYPOVOLEMIC SHOCK (H): Primary | ICD-10-CM

## 2020-01-01 DIAGNOSIS — N17.9 ACUTE KIDNEY INJURY (H): ICD-10-CM

## 2020-01-01 DIAGNOSIS — S32.000D CLOSED COMPRESSION FRACTURE OF LUMBOSACRAL SPINE WITH ROUTINE HEALING, SUBSEQUENT ENCOUNTER: ICD-10-CM

## 2020-01-01 DIAGNOSIS — J69.0 ASPIRATION PNEUMONITIS (H): ICD-10-CM

## 2020-01-01 DIAGNOSIS — J96.01 ACUTE RESPIRATORY FAILURE WITH HYPOXIA (H): ICD-10-CM

## 2020-01-01 LAB
ALBUMIN SERPL-MCNC: 3 G/DL (ref 3.4–5)
ALBUMIN SERPL-MCNC: 3.4 G/DL (ref 3.4–5)
ALBUMIN UR-MCNC: 100 MG/DL
ALP SERPL-CCNC: 36 U/L (ref 40–150)
ALP SERPL-CCNC: 48 U/L (ref 40–150)
ALT SERPL W P-5'-P-CCNC: 43 U/L (ref 0–70)
ALT SERPL W P-5'-P-CCNC: 54 U/L (ref 0–70)
ANION GAP SERPL CALCULATED.3IONS-SCNC: 10 MMOL/L (ref 3–14)
ANION GAP SERPL CALCULATED.3IONS-SCNC: 14 MMOL/L (ref 3–14)
ANION GAP SERPL CALCULATED.3IONS-SCNC: 3 MMOL/L (ref 3–14)
ANION GAP SERPL CALCULATED.3IONS-SCNC: 6 MMOL/L (ref 3–14)
APPEARANCE UR: ABNORMAL
AST SERPL W P-5'-P-CCNC: 141 U/L (ref 0–45)
AST SERPL W P-5'-P-CCNC: 64 U/L (ref 0–45)
BACTERIA SPEC CULT: NO GROWTH
BASE DEFICIT BLDA-SCNC: 1.2 MMOL/L
BASE DEFICIT BLDA-SCNC: 6.1 MMOL/L
BASOPHILS # BLD AUTO: 0 10E9/L (ref 0–0.2)
BASOPHILS NFR BLD AUTO: 0 %
BILIRUB SERPL-MCNC: 0.4 MG/DL (ref 0.2–1.3)
BILIRUB SERPL-MCNC: 0.7 MG/DL (ref 0.2–1.3)
BILIRUB UR QL STRIP: NEGATIVE
BUN SERPL-MCNC: 57 MG/DL (ref 7–30)
BUN SERPL-MCNC: 61 MG/DL (ref 7–30)
BUN SERPL-MCNC: 64 MG/DL (ref 7–30)
BUN SERPL-MCNC: 67 MG/DL (ref 7–30)
BUN SERPL-MCNC: 71 MG/DL (ref 7–30)
BUN SERPL-MCNC: 73 MG/DL (ref 7–30)
CALCIUM SERPL-MCNC: 7.7 MG/DL (ref 8.5–10.1)
CALCIUM SERPL-MCNC: 8.3 MG/DL (ref 8.5–10.1)
CALCIUM SERPL-MCNC: 8.4 MG/DL (ref 8.5–10.1)
CALCIUM SERPL-MCNC: 8.5 MG/DL (ref 8.5–10.1)
CALCIUM SERPL-MCNC: 8.5 MG/DL (ref 8.5–10.1)
CALCIUM SERPL-MCNC: 9.5 MG/DL (ref 8.5–10.1)
CHLORIDE SERPL-SCNC: 103 MMOL/L (ref 94–109)
CHLORIDE SERPL-SCNC: 107 MMOL/L (ref 94–109)
CHLORIDE SERPL-SCNC: 108 MMOL/L (ref 94–109)
CHLORIDE SERPL-SCNC: 108 MMOL/L (ref 94–109)
CHLORIDE SERPL-SCNC: 109 MMOL/L (ref 94–109)
CHLORIDE SERPL-SCNC: 99 MMOL/L (ref 94–109)
CO2 SERPL-SCNC: 20 MMOL/L (ref 20–32)
CO2 SERPL-SCNC: 22 MMOL/L (ref 20–32)
CO2 SERPL-SCNC: 23 MMOL/L (ref 20–32)
CO2 SERPL-SCNC: 24 MMOL/L (ref 20–32)
CO2 SERPL-SCNC: 25 MMOL/L (ref 20–32)
CO2 SERPL-SCNC: 27 MMOL/L (ref 20–32)
COLOR UR AUTO: ABNORMAL
CREAT BLD-MCNC: 3.3 MG/DL (ref 0.66–1.25)
CREAT SERPL-MCNC: 1.56 MG/DL (ref 0.66–1.25)
CREAT SERPL-MCNC: 1.93 MG/DL (ref 0.66–1.25)
CREAT SERPL-MCNC: 2.06 MG/DL (ref 0.66–1.25)
CREAT SERPL-MCNC: 2.38 MG/DL (ref 0.66–1.25)
CREAT SERPL-MCNC: 2.52 MG/DL (ref 0.66–1.25)
CREAT SERPL-MCNC: 2.87 MG/DL (ref 0.66–1.25)
CREAT UR-MCNC: 93 MG/DL
DIFFERENTIAL METHOD BLD: ABNORMAL
EOSINOPHIL # BLD AUTO: 0 10E9/L (ref 0–0.7)
EOSINOPHIL NFR BLD AUTO: 0.4 %
ERYTHROCYTE [DISTWIDTH] IN BLOOD BY AUTOMATED COUNT: 14.8 % (ref 10–15)
ERYTHROCYTE [DISTWIDTH] IN BLOOD BY AUTOMATED COUNT: 14.9 % (ref 10–15)
ERYTHROCYTE [DISTWIDTH] IN BLOOD BY AUTOMATED COUNT: 15.6 % (ref 10–15)
FLUAV+FLUBV AG SPEC QL: NEGATIVE
FLUAV+FLUBV AG SPEC QL: NEGATIVE
FRACT EXCRET NA UR+SERPL-RTO: 1.2 %
GFR SERPL CREATININE-BSD FRML MDRD: 18 ML/MIN/{1.73_M2}
GFR SERPL CREATININE-BSD FRML MDRD: 19 ML/MIN/{1.73_M2}
GFR SERPL CREATININE-BSD FRML MDRD: 22 ML/MIN/{1.73_M2}
GFR SERPL CREATININE-BSD FRML MDRD: 23 ML/MIN/{1.73_M2}
GFR SERPL CREATININE-BSD FRML MDRD: 28 ML/MIN/{1.73_M2}
GFR SERPL CREATININE-BSD FRML MDRD: 30 ML/MIN/{1.73_M2}
GFR SERPL CREATININE-BSD FRML MDRD: 39 ML/MIN/{1.73_M2}
GLUCOSE BLDC GLUCOMTR-MCNC: 101 MG/DL (ref 70–99)
GLUCOSE BLDC GLUCOMTR-MCNC: 108 MG/DL (ref 70–99)
GLUCOSE BLDC GLUCOMTR-MCNC: 110 MG/DL (ref 70–99)
GLUCOSE BLDC GLUCOMTR-MCNC: 115 MG/DL (ref 70–99)
GLUCOSE BLDC GLUCOMTR-MCNC: 115 MG/DL (ref 70–99)
GLUCOSE BLDC GLUCOMTR-MCNC: 117 MG/DL (ref 70–99)
GLUCOSE BLDC GLUCOMTR-MCNC: 121 MG/DL (ref 70–99)
GLUCOSE BLDC GLUCOMTR-MCNC: 122 MG/DL (ref 70–99)
GLUCOSE BLDC GLUCOMTR-MCNC: 59 MG/DL (ref 70–99)
GLUCOSE BLDC GLUCOMTR-MCNC: 68 MG/DL (ref 70–99)
GLUCOSE BLDC GLUCOMTR-MCNC: 75 MG/DL (ref 70–99)
GLUCOSE BLDC GLUCOMTR-MCNC: 83 MG/DL (ref 70–99)
GLUCOSE BLDC GLUCOMTR-MCNC: 86 MG/DL (ref 70–99)
GLUCOSE BLDC GLUCOMTR-MCNC: 87 MG/DL (ref 70–99)
GLUCOSE BLDC GLUCOMTR-MCNC: 87 MG/DL (ref 70–99)
GLUCOSE BLDC GLUCOMTR-MCNC: 90 MG/DL (ref 70–99)
GLUCOSE BLDC GLUCOMTR-MCNC: 91 MG/DL (ref 70–99)
GLUCOSE BLDC GLUCOMTR-MCNC: 94 MG/DL (ref 70–99)
GLUCOSE SERPL-MCNC: 126 MG/DL (ref 70–99)
GLUCOSE SERPL-MCNC: 127 MG/DL (ref 70–99)
GLUCOSE SERPL-MCNC: 141 MG/DL (ref 70–99)
GLUCOSE SERPL-MCNC: 71 MG/DL (ref 70–99)
GLUCOSE SERPL-MCNC: 92 MG/DL (ref 70–99)
GLUCOSE SERPL-MCNC: 94 MG/DL (ref 70–99)
GLUCOSE UR STRIP-MCNC: 30 MG/DL
HCO3 BLD-SCNC: 19 MMOL/L (ref 21–28)
HCO3 BLD-SCNC: 23 MMOL/L (ref 21–28)
HCT VFR BLD AUTO: 33.4 % (ref 40–53)
HCT VFR BLD AUTO: 36.3 % (ref 40–53)
HCT VFR BLD AUTO: 37.1 % (ref 40–53)
HCT VFR BLD AUTO: 38.8 % (ref 40–53)
HCT VFR BLD AUTO: 39 % (ref 40–53)
HGB BLD-MCNC: 11.3 G/DL (ref 13.3–17.7)
HGB BLD-MCNC: 12.2 G/DL (ref 13.3–17.7)
HGB BLD-MCNC: 12.3 G/DL (ref 13.3–17.7)
HGB BLD-MCNC: 12.5 G/DL (ref 13.3–17.7)
HGB BLD-MCNC: 13.1 G/DL (ref 13.3–17.7)
HGB UR QL STRIP: ABNORMAL
IMM GRANULOCYTES # BLD: 0 10E9/L (ref 0–0.4)
IMM GRANULOCYTES NFR BLD: 0.7 %
INTERPRETATION ECG - MUSE: NORMAL
KETONES UR STRIP-MCNC: 5 MG/DL
LACTATE BLD-SCNC: 1.6 MMOL/L (ref 0.7–2)
LACTATE BLD-SCNC: 1.8 MMOL/L (ref 0.7–2)
LACTATE BLD-SCNC: 2.3 MMOL/L (ref 0.7–2)
LACTATE BLD-SCNC: 2.7 MMOL/L (ref 0.7–2)
LACTATE BLD-SCNC: 3.2 MMOL/L (ref 0.7–2)
LACTATE BLD-SCNC: 3.7 MMOL/L (ref 0.7–2)
LACTATE BLD-SCNC: 7.2 MMOL/L (ref 0.7–2)
LEUKOCYTE ESTERASE UR QL STRIP: ABNORMAL
LIPASE SERPL-CCNC: 157 U/L (ref 73–393)
LYMPHOCYTES # BLD AUTO: 0.1 10E9/L (ref 0.8–5.3)
LYMPHOCYTES NFR BLD AUTO: 5.2 %
MAGNESIUM SERPL-MCNC: 2.1 MG/DL (ref 1.6–2.3)
MAGNESIUM SERPL-MCNC: 2.2 MG/DL (ref 1.6–2.3)
MAGNESIUM SERPL-MCNC: 2.2 MG/DL (ref 1.6–2.3)
MAGNESIUM SERPL-MCNC: 2.4 MG/DL (ref 1.6–2.3)
MCH RBC QN AUTO: 30.3 PG (ref 26.5–33)
MCH RBC QN AUTO: 30.4 PG (ref 26.5–33)
MCH RBC QN AUTO: 31.2 PG (ref 26.5–33)
MCHC RBC AUTO-ENTMCNC: 32.2 G/DL (ref 31.5–36.5)
MCHC RBC AUTO-ENTMCNC: 32.9 G/DL (ref 31.5–36.5)
MCHC RBC AUTO-ENTMCNC: 33.6 G/DL (ref 31.5–36.5)
MCHC RBC AUTO-ENTMCNC: 33.8 G/DL (ref 31.5–36.5)
MCHC RBC AUTO-ENTMCNC: 33.9 G/DL (ref 31.5–36.5)
MCV RBC AUTO: 90 FL (ref 78–100)
MCV RBC AUTO: 90 FL (ref 78–100)
MCV RBC AUTO: 92 FL (ref 78–100)
MCV RBC AUTO: 93 FL (ref 78–100)
MCV RBC AUTO: 94 FL (ref 78–100)
MONOCYTES # BLD AUTO: 0.2 10E9/L (ref 0–1.3)
MONOCYTES NFR BLD AUTO: 7.8 %
MRSA DNA SPEC QL NAA+PROBE: NEGATIVE
MUCOUS THREADS #/AREA URNS LPF: PRESENT /LPF
NEUTROPHILS # BLD AUTO: 2.3 10E9/L (ref 1.6–8.3)
NEUTROPHILS NFR BLD AUTO: 85.9 %
NITRATE UR QL: NEGATIVE
NRBC # BLD AUTO: 0 10*3/UL
NRBC BLD AUTO-RTO: 0 /100
NT-PROBNP SERPL-MCNC: ABNORMAL PG/ML (ref 0–1800)
O2/TOTAL GAS SETTING VFR VENT: 100 %
O2/TOTAL GAS SETTING VFR VENT: ABNORMAL %
OXYHGB MFR BLD: 92 % (ref 92–100)
OXYHGB MFR BLD: 95 % (ref 92–100)
PCO2 BLD: 35 MM HG (ref 35–45)
PCO2 BLD: 36 MM HG (ref 35–45)
PH BLD: 7.34 PH (ref 7.35–7.45)
PH BLD: 7.42 PH (ref 7.35–7.45)
PH UR STRIP: 5.5 PH (ref 5–7)
PHOSPHATE SERPL-MCNC: 2.9 MG/DL (ref 2.5–4.5)
PHOSPHATE SERPL-MCNC: 3.5 MG/DL (ref 2.5–4.5)
PHOSPHATE SERPL-MCNC: 3.5 MG/DL (ref 2.5–4.5)
PHOSPHATE SERPL-MCNC: 4.3 MG/DL (ref 2.5–4.5)
PLATELET # BLD AUTO: 119 10E9/L (ref 150–450)
PLATELET # BLD AUTO: 133 10E9/L (ref 150–450)
PLATELET # BLD AUTO: 187 10E9/L (ref 150–450)
PLATELET # BLD AUTO: 194 10E9/L (ref 150–450)
PLATELET # BLD AUTO: 97 10E9/L (ref 150–450)
PO2 BLD: 69 MM HG (ref 80–105)
PO2 BLD: 83 MM HG (ref 80–105)
POTASSIUM SERPL-SCNC: 3.3 MMOL/L (ref 3.4–5.3)
POTASSIUM SERPL-SCNC: 3.3 MMOL/L (ref 3.4–5.3)
POTASSIUM SERPL-SCNC: 3.4 MMOL/L (ref 3.4–5.3)
POTASSIUM SERPL-SCNC: 3.8 MMOL/L (ref 3.4–5.3)
POTASSIUM SERPL-SCNC: 3.9 MMOL/L (ref 3.4–5.3)
POTASSIUM SERPL-SCNC: 4.4 MMOL/L (ref 3.4–5.3)
PROCALCITONIN SERPL-MCNC: >200 NG/ML
PROT SERPL-MCNC: 6.7 G/DL (ref 6.8–8.8)
PROT SERPL-MCNC: 7.1 G/DL (ref 6.8–8.8)
RBC # BLD AUTO: 3.73 10E12/L (ref 4.4–5.9)
RBC # BLD AUTO: 4.03 10E12/L (ref 4.4–5.9)
RBC # BLD AUTO: 4.04 10E12/L (ref 4.4–5.9)
RBC # BLD AUTO: 4.13 10E12/L (ref 4.4–5.9)
RBC # BLD AUTO: 4.2 10E12/L (ref 4.4–5.9)
RBC #/AREA URNS AUTO: 9 /HPF (ref 0–2)
SODIUM SERPL-SCNC: 133 MMOL/L (ref 133–144)
SODIUM SERPL-SCNC: 135 MMOL/L (ref 133–144)
SODIUM SERPL-SCNC: 136 MMOL/L (ref 133–144)
SODIUM SERPL-SCNC: 138 MMOL/L (ref 133–144)
SODIUM SERPL-SCNC: 139 MMOL/L (ref 133–144)
SODIUM SERPL-SCNC: 139 MMOL/L (ref 133–144)
SODIUM UR-SCNC: 54 MMOL/L
SOURCE: ABNORMAL
SP GR UR STRIP: 1.02 (ref 1–1.03)
SPECIMEN SOURCE: NORMAL
TROPONIN I SERPL-MCNC: 0.03 UG/L (ref 0–0.04)
TROPONIN I SERPL-MCNC: <0.015 UG/L (ref 0–0.04)
UROBILINOGEN UR STRIP-MCNC: 2 MG/DL (ref 0–2)
WBC # BLD AUTO: 11.9 10E9/L (ref 4–11)
WBC # BLD AUTO: 2.7 10E9/L (ref 4–11)
WBC # BLD AUTO: 4.2 10E9/L (ref 4–11)
WBC # BLD AUTO: 8.1 10E9/L (ref 4–11)
WBC # BLD AUTO: 9 10E9/L (ref 4–11)
WBC #/AREA URNS AUTO: 17 /HPF (ref 0–5)

## 2020-01-01 PROCEDURE — 25000125 ZZHC RX 250: Performed by: OBSTETRICS & GYNECOLOGY

## 2020-01-01 PROCEDURE — 36415 COLL VENOUS BLD VENIPUNCTURE: CPT | Performed by: INTERNAL MEDICINE

## 2020-01-01 PROCEDURE — 00000146 ZZHCL STATISTIC GLUCOSE BY METER IP

## 2020-01-01 PROCEDURE — 25000128 H RX IP 250 OP 636: Performed by: INTERNAL MEDICINE

## 2020-01-01 PROCEDURE — 93005 ELECTROCARDIOGRAM TRACING: CPT

## 2020-01-01 PROCEDURE — 25800025 ZZH RX 258: Performed by: INTERNAL MEDICINE

## 2020-01-01 PROCEDURE — 25000132 ZZH RX MED GY IP 250 OP 250 PS 637: Mod: GY | Performed by: INTERNAL MEDICINE

## 2020-01-01 PROCEDURE — 40000275 ZZH STATISTIC RCP TIME EA 10 MIN

## 2020-01-01 PROCEDURE — 84100 ASSAY OF PHOSPHORUS: CPT | Performed by: INTERNAL MEDICINE

## 2020-01-01 PROCEDURE — 36600 WITHDRAWAL OF ARTERIAL BLOOD: CPT

## 2020-01-01 PROCEDURE — 99233 SBSQ HOSP IP/OBS HIGH 50: CPT | Performed by: INTERNAL MEDICINE

## 2020-01-01 PROCEDURE — 25800030 ZZH RX IP 258 OP 636: Performed by: EMERGENCY MEDICINE

## 2020-01-01 PROCEDURE — 83735 ASSAY OF MAGNESIUM: CPT | Performed by: INTERNAL MEDICINE

## 2020-01-01 PROCEDURE — 25800025 ZZH RX 258

## 2020-01-01 PROCEDURE — 40000915 ZZH STATISTIC SITTER, EVENING HOURS

## 2020-01-01 PROCEDURE — 25800030 ZZH RX IP 258 OP 636: Performed by: INTERNAL MEDICINE

## 2020-01-01 PROCEDURE — 80053 COMPREHEN METABOLIC PANEL: CPT | Performed by: EMERGENCY MEDICINE

## 2020-01-01 PROCEDURE — 82805 BLOOD GASES W/O2 SATURATION: CPT | Performed by: OBSTETRICS & GYNECOLOGY

## 2020-01-01 PROCEDURE — 71045 X-RAY EXAM CHEST 1 VIEW: CPT

## 2020-01-01 PROCEDURE — 84100 ASSAY OF PHOSPHORUS: CPT | Performed by: OBSTETRICS & GYNECOLOGY

## 2020-01-01 PROCEDURE — 40000914 ZZH STATISTIC SITTER, DAY HOURS

## 2020-01-01 PROCEDURE — 25800030 ZZH RX IP 258 OP 636: Performed by: OBSTETRICS & GYNECOLOGY

## 2020-01-01 PROCEDURE — 25000125 ZZHC RX 250: Performed by: INTERNAL MEDICINE

## 2020-01-01 PROCEDURE — 94640 AIRWAY INHALATION TREATMENT: CPT

## 2020-01-01 PROCEDURE — 80048 BASIC METABOLIC PNL TOTAL CA: CPT | Performed by: INTERNAL MEDICINE

## 2020-01-01 PROCEDURE — 84484 ASSAY OF TROPONIN QUANT: CPT | Performed by: OBSTETRICS & GYNECOLOGY

## 2020-01-01 PROCEDURE — 87252 VIRUS INOCULATION TISSUE: CPT | Performed by: INTERNAL MEDICINE

## 2020-01-01 PROCEDURE — 99231 SBSQ HOSP IP/OBS SF/LOW 25: CPT | Performed by: SURGERY

## 2020-01-01 PROCEDURE — 20000003 ZZH R&B ICU

## 2020-01-01 PROCEDURE — 85025 COMPLETE CBC W/AUTO DIFF WBC: CPT | Performed by: EMERGENCY MEDICINE

## 2020-01-01 PROCEDURE — 99291 CRITICAL CARE FIRST HOUR: CPT

## 2020-01-01 PROCEDURE — 36415 COLL VENOUS BLD VENIPUNCTURE: CPT | Performed by: OBSTETRICS & GYNECOLOGY

## 2020-01-01 PROCEDURE — 99358 PROLONG SERVICE W/O CONTACT: CPT | Performed by: INTERNAL MEDICINE

## 2020-01-01 PROCEDURE — 87804 INFLUENZA ASSAY W/OPTIC: CPT | Performed by: EMERGENCY MEDICINE

## 2020-01-01 PROCEDURE — 99292 CRITICAL CARE ADDL 30 MIN: CPT

## 2020-01-01 PROCEDURE — 83605 ASSAY OF LACTIC ACID: CPT | Performed by: INTERNAL MEDICINE

## 2020-01-01 PROCEDURE — 94640 AIRWAY INHALATION TREATMENT: CPT | Mod: 76

## 2020-01-01 PROCEDURE — 25000128 H RX IP 250 OP 636: Performed by: OBSTETRICS & GYNECOLOGY

## 2020-01-01 PROCEDURE — 83690 ASSAY OF LIPASE: CPT | Performed by: EMERGENCY MEDICINE

## 2020-01-01 PROCEDURE — 85027 COMPLETE CBC AUTOMATED: CPT | Performed by: INTERNAL MEDICINE

## 2020-01-01 PROCEDURE — 99223 1ST HOSP IP/OBS HIGH 75: CPT | Mod: AI | Performed by: INTERNAL MEDICINE

## 2020-01-01 PROCEDURE — 81001 URINALYSIS AUTO W/SCOPE: CPT | Performed by: EMERGENCY MEDICINE

## 2020-01-01 PROCEDURE — 80048 BASIC METABOLIC PNL TOTAL CA: CPT | Performed by: OBSTETRICS & GYNECOLOGY

## 2020-01-01 PROCEDURE — 83880 ASSAY OF NATRIURETIC PEPTIDE: CPT | Performed by: INTERNAL MEDICINE

## 2020-01-01 PROCEDURE — 83605 ASSAY OF LACTIC ACID: CPT | Performed by: EMERGENCY MEDICINE

## 2020-01-01 PROCEDURE — 93010 ELECTROCARDIOGRAM REPORT: CPT | Performed by: INTERNAL MEDICINE

## 2020-01-01 PROCEDURE — 83605 ASSAY OF LACTIC ACID: CPT | Performed by: OBSTETRICS & GYNECOLOGY

## 2020-01-01 PROCEDURE — 87040 BLOOD CULTURE FOR BACTERIA: CPT | Performed by: EMERGENCY MEDICINE

## 2020-01-01 PROCEDURE — 84145 PROCALCITONIN (PCT): CPT | Performed by: INTERNAL MEDICINE

## 2020-01-01 PROCEDURE — 99233 SBSQ HOSP IP/OBS HIGH 50: CPT | Performed by: HOSPITALIST

## 2020-01-01 PROCEDURE — 99238 HOSP IP/OBS DSCHRG MGMT 30/<: CPT | Performed by: NURSE PRACTITIONER

## 2020-01-01 PROCEDURE — 99291 CRITICAL CARE FIRST HOUR: CPT | Performed by: INTERNAL MEDICINE

## 2020-01-01 PROCEDURE — 96361 HYDRATE IV INFUSION ADD-ON: CPT

## 2020-01-01 PROCEDURE — 74018 RADEX ABDOMEN 1 VIEW: CPT

## 2020-01-01 PROCEDURE — 87086 URINE CULTURE/COLONY COUNT: CPT | Performed by: EMERGENCY MEDICINE

## 2020-01-01 PROCEDURE — 96365 THER/PROPH/DIAG IV INF INIT: CPT

## 2020-01-01 PROCEDURE — 87640 STAPH A DNA AMP PROBE: CPT | Performed by: INTERNAL MEDICINE

## 2020-01-01 PROCEDURE — 82805 BLOOD GASES W/O2 SATURATION: CPT | Performed by: INTERNAL MEDICINE

## 2020-01-01 PROCEDURE — 25000132 ZZH RX MED GY IP 250 OP 250 PS 637: Mod: GY | Performed by: STUDENT IN AN ORGANIZED HEALTH CARE EDUCATION/TRAINING PROGRAM

## 2020-01-01 PROCEDURE — 99223 1ST HOSP IP/OBS HIGH 75: CPT | Performed by: INTERNAL MEDICINE

## 2020-01-01 PROCEDURE — 40000986 XR ABDOMEN PORT 1 VW

## 2020-01-01 PROCEDURE — 83735 ASSAY OF MAGNESIUM: CPT | Performed by: OBSTETRICS & GYNECOLOGY

## 2020-01-01 PROCEDURE — 82565 ASSAY OF CREATININE: CPT

## 2020-01-01 PROCEDURE — 25000128 H RX IP 250 OP 636: Performed by: EMERGENCY MEDICINE

## 2020-01-01 PROCEDURE — 99207 ZZC APP CREDIT; MD BILLING SHARED VISIT: CPT | Performed by: HOSPITALIST

## 2020-01-01 PROCEDURE — 82570 ASSAY OF URINE CREATININE: CPT | Performed by: INTERNAL MEDICINE

## 2020-01-01 PROCEDURE — 84484 ASSAY OF TROPONIN QUANT: CPT | Performed by: EMERGENCY MEDICINE

## 2020-01-01 PROCEDURE — 71250 CT THORAX DX C-: CPT

## 2020-01-01 PROCEDURE — 93010 ELECTROCARDIOGRAM REPORT: CPT | Mod: 76 | Performed by: INTERNAL MEDICINE

## 2020-01-01 PROCEDURE — 87641 MR-STAPH DNA AMP PROBE: CPT | Performed by: INTERNAL MEDICINE

## 2020-01-01 PROCEDURE — 99221 1ST HOSP IP/OBS SF/LOW 40: CPT | Performed by: SURGERY

## 2020-01-01 PROCEDURE — 84484 ASSAY OF TROPONIN QUANT: CPT | Performed by: INTERNAL MEDICINE

## 2020-01-01 PROCEDURE — 25000128 H RX IP 250 OP 636: Performed by: STUDENT IN AN ORGANIZED HEALTH CARE EDUCATION/TRAINING PROGRAM

## 2020-01-01 PROCEDURE — 80053 COMPREHEN METABOLIC PANEL: CPT | Performed by: INTERNAL MEDICINE

## 2020-01-01 PROCEDURE — 84300 ASSAY OF URINE SODIUM: CPT | Performed by: INTERNAL MEDICINE

## 2020-01-01 RX ORDER — LORAZEPAM 2 MG/ML
1-2 INJECTION INTRAMUSCULAR EVERY 10 MIN PRN
Status: DISCONTINUED | OUTPATIENT
Start: 2020-01-01 | End: 2020-03-07 | Stop reason: HOSPADM

## 2020-01-01 RX ORDER — HYDROMORPHONE HYDROCHLORIDE 1 MG/ML
.5-1 INJECTION, SOLUTION INTRAMUSCULAR; INTRAVENOUS; SUBCUTANEOUS
Status: DISCONTINUED | OUTPATIENT
Start: 2020-01-01 | End: 2020-01-01

## 2020-01-01 RX ORDER — DEXMEDETOMIDINE HYDROCHLORIDE 4 UG/ML
0.2-0.7 INJECTION, SOLUTION INTRAVENOUS CONTINUOUS
Status: DISCONTINUED | OUTPATIENT
Start: 2020-01-01 | End: 2020-01-01 | Stop reason: CLARIF

## 2020-01-01 RX ORDER — GLYCOPYRROLATE 0.2 MG/ML
.1-.2 INJECTION, SOLUTION INTRAMUSCULAR; INTRAVENOUS EVERY 4 HOURS PRN
Status: DISCONTINUED | OUTPATIENT
Start: 2020-01-01 | End: 2020-03-07 | Stop reason: HOSPADM

## 2020-01-01 RX ORDER — POTASSIUM CHLORIDE 1.5 G/1.58G
20-40 POWDER, FOR SOLUTION ORAL
Status: DISCONTINUED | OUTPATIENT
Start: 2020-01-01 | End: 2020-03-07 | Stop reason: HOSPADM

## 2020-01-01 RX ORDER — ALBUTEROL SULFATE 5 MG/ML
2.5 SOLUTION RESPIRATORY (INHALATION) EVERY 6 HOURS PRN
Status: DISCONTINUED | OUTPATIENT
Start: 2020-01-01 | End: 2020-03-07 | Stop reason: HOSPADM

## 2020-01-01 RX ORDER — SODIUM CHLORIDE 9 MG/ML
1000 INJECTION, SOLUTION INTRAVENOUS CONTINUOUS
Status: DISCONTINUED | OUTPATIENT
Start: 2020-01-01 | End: 2020-01-01

## 2020-01-01 RX ORDER — METOPROLOL TARTRATE 1 MG/ML
5 INJECTION, SOLUTION INTRAVENOUS ONCE
Status: CANCELLED | OUTPATIENT
Start: 2020-01-01

## 2020-01-01 RX ORDER — DEXTROSE MONOHYDRATE 25 G/50ML
25-50 INJECTION, SOLUTION INTRAVENOUS
Status: DISCONTINUED | OUTPATIENT
Start: 2020-01-01 | End: 2020-03-07 | Stop reason: HOSPADM

## 2020-01-01 RX ORDER — ONDANSETRON 2 MG/ML
4 INJECTION INTRAMUSCULAR; INTRAVENOUS
Status: DISCONTINUED | OUTPATIENT
Start: 2020-01-01 | End: 2020-01-01

## 2020-01-01 RX ORDER — LEVALBUTEROL 1.25 MG/.5ML
0.31 SOLUTION, CONCENTRATE RESPIRATORY (INHALATION) EVERY 4 HOURS PRN
Status: DISCONTINUED | OUTPATIENT
Start: 2020-01-01 | End: 2020-01-01

## 2020-01-01 RX ORDER — ONDANSETRON 2 MG/ML
4 INJECTION INTRAMUSCULAR; INTRAVENOUS EVERY 6 HOURS PRN
Status: DISCONTINUED | OUTPATIENT
Start: 2020-01-01 | End: 2020-03-07 | Stop reason: HOSPADM

## 2020-01-01 RX ORDER — POTASSIUM CL/LIDO/0.9 % NACL 10MEQ/0.1L
10 INTRAVENOUS SOLUTION, PIGGYBACK (ML) INTRAVENOUS
Status: DISCONTINUED | OUTPATIENT
Start: 2020-01-01 | End: 2020-03-07 | Stop reason: HOSPADM

## 2020-01-01 RX ORDER — NICOTINE POLACRILEX 4 MG
15-30 LOZENGE BUCCAL
Status: DISCONTINUED | OUTPATIENT
Start: 2020-01-01 | End: 2020-03-07 | Stop reason: HOSPADM

## 2020-01-01 RX ORDER — ONDANSETRON 4 MG/1
4 TABLET, ORALLY DISINTEGRATING ORAL EVERY 6 HOURS PRN
Status: DISCONTINUED | OUTPATIENT
Start: 2020-01-01 | End: 2020-03-07 | Stop reason: HOSPADM

## 2020-01-01 RX ORDER — SODIUM CHLORIDE, SODIUM LACTATE, POTASSIUM CHLORIDE, CALCIUM CHLORIDE 600; 310; 30; 20 MG/100ML; MG/100ML; MG/100ML; MG/100ML
INJECTION, SOLUTION INTRAVENOUS CONTINUOUS
Status: DISCONTINUED | OUTPATIENT
Start: 2020-01-01 | End: 2020-01-01

## 2020-01-01 RX ORDER — POTASSIUM CHLORIDE 7.45 MG/ML
10 INJECTION INTRAVENOUS
Status: DISCONTINUED | OUTPATIENT
Start: 2020-01-01 | End: 2020-03-07 | Stop reason: HOSPADM

## 2020-01-01 RX ORDER — IBUPROFEN 600 MG/1
600 TABLET, FILM COATED ORAL EVERY 8 HOURS PRN
Qty: 30 TABLET | Refills: 2 | Status: ON HOLD | OUTPATIENT
Start: 2020-01-01 | End: 2020-01-01

## 2020-01-01 RX ORDER — POTASSIUM CHLORIDE 29.8 MG/ML
20 INJECTION INTRAVENOUS
Status: DISCONTINUED | OUTPATIENT
Start: 2020-01-01 | End: 2020-03-07 | Stop reason: HOSPADM

## 2020-01-01 RX ORDER — DEXTROSE MONOHYDRATE, SODIUM CHLORIDE, AND POTASSIUM CHLORIDE 50; 1.49; 4.5 G/1000ML; G/1000ML; G/1000ML
INJECTION, SOLUTION INTRAVENOUS CONTINUOUS
Status: DISCONTINUED | OUTPATIENT
Start: 2020-01-01 | End: 2020-01-01

## 2020-01-01 RX ORDER — IPRATROPIUM BROMIDE AND ALBUTEROL SULFATE 2.5; .5 MG/3ML; MG/3ML
3 SOLUTION RESPIRATORY (INHALATION)
Status: DISCONTINUED | OUTPATIENT
Start: 2020-01-01 | End: 2020-01-01

## 2020-01-01 RX ORDER — PROCHLORPERAZINE 25 MG
12.5 SUPPOSITORY, RECTAL RECTAL EVERY 12 HOURS PRN
Status: DISCONTINUED | OUTPATIENT
Start: 2020-01-01 | End: 2020-03-07 | Stop reason: HOSPADM

## 2020-01-01 RX ORDER — ATROPINE SULFATE 10 MG/ML
1-2 SOLUTION/ DROPS OPHTHALMIC
Status: DISCONTINUED | OUTPATIENT
Start: 2020-01-01 | End: 2020-03-07 | Stop reason: HOSPADM

## 2020-01-01 RX ORDER — DEXTROSE MONOHYDRATE 25 G/50ML
INJECTION, SOLUTION INTRAVENOUS
Status: COMPLETED
Start: 2020-01-01 | End: 2020-01-01

## 2020-01-01 RX ORDER — FUROSEMIDE 10 MG/ML
20 INJECTION INTRAMUSCULAR; INTRAVENOUS ONCE
Status: COMPLETED | OUTPATIENT
Start: 2020-01-01 | End: 2020-01-01

## 2020-01-01 RX ORDER — ACETAMINOPHEN 325 MG/1
650 TABLET ORAL EVERY 4 HOURS PRN
Status: DISCONTINUED | OUTPATIENT
Start: 2020-01-01 | End: 2020-03-07 | Stop reason: HOSPADM

## 2020-01-01 RX ORDER — LORAZEPAM 2 MG/ML
1-2 INJECTION INTRAMUSCULAR
Status: DISCONTINUED | OUTPATIENT
Start: 2020-01-01 | End: 2020-01-01

## 2020-01-01 RX ORDER — MAGNESIUM SULFATE HEPTAHYDRATE 40 MG/ML
2 INJECTION, SOLUTION INTRAVENOUS DAILY PRN
Status: DISCONTINUED | OUTPATIENT
Start: 2020-01-01 | End: 2020-03-07 | Stop reason: HOSPADM

## 2020-01-01 RX ORDER — HYDROMORPHONE HYDROCHLORIDE 1 MG/ML
1-2 INJECTION, SOLUTION INTRAMUSCULAR; INTRAVENOUS; SUBCUTANEOUS EVERY 30 MIN PRN
Status: DISCONTINUED | OUTPATIENT
Start: 2020-01-01 | End: 2020-01-01

## 2020-01-01 RX ORDER — PIPERACILLIN SODIUM, TAZOBACTAM SODIUM 3; .375 G/15ML; G/15ML
3.38 INJECTION, POWDER, LYOPHILIZED, FOR SOLUTION INTRAVENOUS ONCE
Status: COMPLETED | OUTPATIENT
Start: 2020-01-01 | End: 2020-01-01

## 2020-01-01 RX ORDER — BISACODYL 10 MG
10 SUPPOSITORY, RECTAL RECTAL 2 TIMES DAILY
Status: DISCONTINUED | OUTPATIENT
Start: 2020-01-01 | End: 2020-01-01

## 2020-01-01 RX ORDER — OLANZAPINE 10 MG/2ML
5 INJECTION, POWDER, FOR SOLUTION INTRAMUSCULAR DAILY PRN
Status: DISCONTINUED | OUTPATIENT
Start: 2020-01-01 | End: 2020-03-07 | Stop reason: HOSPADM

## 2020-01-01 RX ORDER — MAGNESIUM SULFATE HEPTAHYDRATE 40 MG/ML
4 INJECTION, SOLUTION INTRAVENOUS EVERY 4 HOURS PRN
Status: DISCONTINUED | OUTPATIENT
Start: 2020-01-01 | End: 2020-03-07 | Stop reason: HOSPADM

## 2020-01-01 RX ORDER — SODIUM CHLORIDE 9 MG/ML
INJECTION, SOLUTION INTRAVENOUS CONTINUOUS
Status: DISCONTINUED | OUTPATIENT
Start: 2020-01-01 | End: 2020-01-01

## 2020-01-01 RX ORDER — ACETAMINOPHEN 650 MG/1
650 SUPPOSITORY RECTAL EVERY 4 HOURS PRN
Status: DISCONTINUED | OUTPATIENT
Start: 2020-01-01 | End: 2020-03-07 | Stop reason: HOSPADM

## 2020-01-01 RX ORDER — NALOXONE HYDROCHLORIDE 0.4 MG/ML
.1-.4 INJECTION, SOLUTION INTRAMUSCULAR; INTRAVENOUS; SUBCUTANEOUS
Status: DISCONTINUED | OUTPATIENT
Start: 2020-01-01 | End: 2020-03-07 | Stop reason: HOSPADM

## 2020-01-01 RX ORDER — HYDROMORPHONE HYDROCHLORIDE 1 MG/ML
1 INJECTION, SOLUTION INTRAMUSCULAR; INTRAVENOUS; SUBCUTANEOUS ONCE
Status: COMPLETED | OUTPATIENT
Start: 2020-01-01 | End: 2020-01-01

## 2020-01-01 RX ORDER — QUETIAPINE FUMARATE 25 MG/1
50 TABLET, FILM COATED ORAL 3 TIMES DAILY PRN
Status: DISCONTINUED | OUTPATIENT
Start: 2020-01-01 | End: 2020-03-07 | Stop reason: HOSPADM

## 2020-01-01 RX ORDER — GLIPIZIDE 10 MG/1
1 TABLET ORAL
Status: DISCONTINUED | OUTPATIENT
Start: 2020-01-01 | End: 2020-03-07 | Stop reason: HOSPADM

## 2020-01-01 RX ORDER — LEVALBUTEROL 1.25 MG/.5ML
0.31 SOLUTION, CONCENTRATE RESPIRATORY (INHALATION) EVERY 4 HOURS
Status: DISCONTINUED | OUTPATIENT
Start: 2020-01-01 | End: 2020-03-07 | Stop reason: HOSPADM

## 2020-01-01 RX ORDER — FUROSEMIDE 10 MG/ML
20 INJECTION INTRAMUSCULAR; INTRAVENOUS EVERY 8 HOURS
Status: DISCONTINUED | OUTPATIENT
Start: 2020-01-01 | End: 2020-01-01

## 2020-01-01 RX ORDER — PROCHLORPERAZINE MALEATE 5 MG
5 TABLET ORAL EVERY 6 HOURS PRN
Status: DISCONTINUED | OUTPATIENT
Start: 2020-01-01 | End: 2020-03-07 | Stop reason: HOSPADM

## 2020-01-01 RX ORDER — PIPERACILLIN SODIUM, TAZOBACTAM SODIUM 3; .375 G/15ML; G/15ML
3.38 INJECTION, POWDER, LYOPHILIZED, FOR SOLUTION INTRAVENOUS EVERY 6 HOURS
Status: DISCONTINUED | OUTPATIENT
Start: 2020-01-01 | End: 2020-01-01

## 2020-01-01 RX ORDER — POTASSIUM CHLORIDE 1500 MG/1
20-40 TABLET, EXTENDED RELEASE ORAL
Status: DISCONTINUED | OUTPATIENT
Start: 2020-01-01 | End: 2020-03-07 | Stop reason: HOSPADM

## 2020-01-01 RX ORDER — POTASSIUM CHLORIDE 7.45 MG/ML
10 INJECTION INTRAVENOUS
Status: COMPLETED | OUTPATIENT
Start: 2020-01-01 | End: 2020-01-01

## 2020-01-01 RX ORDER — PIPERACILLIN SODIUM, TAZOBACTAM SODIUM 2; .25 G/10ML; G/10ML
2.25 INJECTION, POWDER, LYOPHILIZED, FOR SOLUTION INTRAVENOUS EVERY 6 HOURS
Status: DISCONTINUED | OUTPATIENT
Start: 2020-01-01 | End: 2020-01-01

## 2020-01-01 RX ORDER — POTASSIUM CL/LIDO/0.9 % NACL 10MEQ/0.1L
10 INTRAVENOUS SOLUTION, PIGGYBACK (ML) INTRAVENOUS
Status: COMPLETED | OUTPATIENT
Start: 2020-01-01 | End: 2020-01-01

## 2020-01-01 RX ORDER — QUETIAPINE FUMARATE 25 MG/1
25 TABLET, FILM COATED ORAL 2 TIMES DAILY
Status: DISCONTINUED | OUTPATIENT
Start: 2020-01-01 | End: 2020-01-01

## 2020-01-01 RX ORDER — HYDROMORPHONE HYDROCHLORIDE 1 MG/ML
1-2 INJECTION, SOLUTION INTRAMUSCULAR; INTRAVENOUS; SUBCUTANEOUS
Status: DISCONTINUED | OUTPATIENT
Start: 2020-01-01 | End: 2020-03-07 | Stop reason: HOSPADM

## 2020-01-01 RX ORDER — HYDROMORPHONE HYDROCHLORIDE 1 MG/ML
.3-.5 INJECTION, SOLUTION INTRAMUSCULAR; INTRAVENOUS; SUBCUTANEOUS
Status: DISCONTINUED | OUTPATIENT
Start: 2020-01-01 | End: 2020-01-01

## 2020-01-01 RX ORDER — LORAZEPAM 2 MG/ML
1 INJECTION INTRAMUSCULAR ONCE
Status: COMPLETED | OUTPATIENT
Start: 2020-01-01 | End: 2020-01-01

## 2020-01-01 RX ADMIN — Medication 10 MEQ: at 03:16

## 2020-01-01 RX ADMIN — DEXTROSE MONOHYDRATE 25 ML: 25 INJECTION, SOLUTION INTRAVENOUS at 00:03

## 2020-01-01 RX ADMIN — LEVALBUTEROL HYDROCHLORIDE 0.3 MG: 1.25 SOLUTION, CONCENTRATE RESPIRATORY (INHALATION) at 23:56

## 2020-01-01 RX ADMIN — LEVALBUTEROL HYDROCHLORIDE 0.3 MG: 1.25 SOLUTION, CONCENTRATE RESPIRATORY (INHALATION) at 04:07

## 2020-01-01 RX ADMIN — LORAZEPAM 1 MG: 2 INJECTION INTRAMUSCULAR; INTRAVENOUS at 18:26

## 2020-01-01 RX ADMIN — IPRATROPIUM BROMIDE AND ALBUTEROL SULFATE 3 ML: .5; 3 SOLUTION RESPIRATORY (INHALATION) at 19:45

## 2020-01-01 RX ADMIN — SODIUM CHLORIDE 1000 ML: 9 INJECTION, SOLUTION INTRAVENOUS at 20:20

## 2020-01-01 RX ADMIN — Medication 1 MG: at 00:35

## 2020-01-01 RX ADMIN — FUROSEMIDE 20 MG: 10 INJECTION, SOLUTION INTRAMUSCULAR; INTRAVENOUS at 04:36

## 2020-01-01 RX ADMIN — PIPERACILLIN AND TAZOBACTAM 3.38 G: 3; .375 INJECTION, POWDER, FOR SOLUTION INTRAVENOUS at 11:26

## 2020-01-01 RX ADMIN — AMIODARONE HYDROCHLORIDE 150 MG: 1.5 INJECTION, SOLUTION INTRAVENOUS at 20:45

## 2020-01-01 RX ADMIN — Medication 10 MEQ: at 02:15

## 2020-01-01 RX ADMIN — LEVALBUTEROL HYDROCHLORIDE 0.3 MG: 1.25 SOLUTION, CONCENTRATE RESPIRATORY (INHALATION) at 20:26

## 2020-01-01 RX ADMIN — IPRATROPIUM BROMIDE AND ALBUTEROL SULFATE 3 ML: .5; 3 SOLUTION RESPIRATORY (INHALATION) at 00:37

## 2020-01-01 RX ADMIN — AMIODARONE HYDROCHLORIDE 0.5 MG/MIN: 50 INJECTION, SOLUTION INTRAVENOUS at 14:54

## 2020-01-01 RX ADMIN — LEVALBUTEROL HYDROCHLORIDE 0.3 MG: 1.25 SOLUTION, CONCENTRATE RESPIRATORY (INHALATION) at 15:34

## 2020-01-01 RX ADMIN — DEXTROSE AND SODIUM CHLORIDE: 5; 900 INJECTION, SOLUTION INTRAVENOUS at 03:28

## 2020-01-01 RX ADMIN — PIPERACILLIN SODIUM AND TAZOBACTAM SODIUM 2.25 G: 2; .25 INJECTION, POWDER, LYOPHILIZED, FOR SOLUTION INTRAVENOUS at 08:05

## 2020-01-01 RX ADMIN — IPRATROPIUM BROMIDE AND ALBUTEROL SULFATE 3 ML: .5; 3 SOLUTION RESPIRATORY (INHALATION) at 16:03

## 2020-01-01 RX ADMIN — PIPERACILLIN SODIUM AND TAZOBACTAM SODIUM 2.25 G: 2; .25 INJECTION, POWDER, LYOPHILIZED, FOR SOLUTION INTRAVENOUS at 01:47

## 2020-01-01 RX ADMIN — IPRATROPIUM BROMIDE AND ALBUTEROL SULFATE 3 ML: .5; 3 SOLUTION RESPIRATORY (INHALATION) at 07:49

## 2020-01-01 RX ADMIN — LEVALBUTEROL HYDROCHLORIDE 0.3 MG: 1.25 SOLUTION, CONCENTRATE RESPIRATORY (INHALATION) at 06:15

## 2020-01-01 RX ADMIN — QUETIAPINE 50 MG: 25 TABLET ORAL at 16:21

## 2020-01-01 RX ADMIN — DEXTROSE AND SODIUM CHLORIDE: 5; 450 INJECTION, SOLUTION INTRAVENOUS at 08:27

## 2020-01-01 RX ADMIN — LEVALBUTEROL HYDROCHLORIDE 0.3 MG: 1.25 SOLUTION, CONCENTRATE RESPIRATORY (INHALATION) at 14:51

## 2020-01-01 RX ADMIN — DIATRIZOATE MEGLUMINE AND DIATRIZOATE SODIUM 90 ML: 660; 100 SOLUTION ORAL; RECTAL at 12:15

## 2020-01-01 RX ADMIN — DEXTROSE AND SODIUM CHLORIDE: 5; 450 INJECTION, SOLUTION INTRAVENOUS at 17:15

## 2020-01-01 RX ADMIN — LEVALBUTEROL HYDROCHLORIDE 0.3 MG: 1.25 SOLUTION, CONCENTRATE RESPIRATORY (INHALATION) at 19:50

## 2020-01-01 RX ADMIN — PIPERACILLIN AND TAZOBACTAM 3.38 G: 3; .375 INJECTION, POWDER, FOR SOLUTION INTRAVENOUS at 23:38

## 2020-01-01 RX ADMIN — AMIODARONE HYDROCHLORIDE 1 MG/MIN: 50 INJECTION, SOLUTION INTRAVENOUS at 00:53

## 2020-01-01 RX ADMIN — PIPERACILLIN SODIUM AND TAZOBACTAM SODIUM 2.25 G: 2; .25 INJECTION, POWDER, LYOPHILIZED, FOR SOLUTION INTRAVENOUS at 16:52

## 2020-01-01 RX ADMIN — PIPERACILLIN SODIUM AND TAZOBACTAM SODIUM 2.25 G: 2; .25 INJECTION, POWDER, LYOPHILIZED, FOR SOLUTION INTRAVENOUS at 09:39

## 2020-01-01 RX ADMIN — POTASSIUM CHLORIDE 10 MEQ: 7.46 INJECTION, SOLUTION INTRAVENOUS at 16:34

## 2020-01-01 RX ADMIN — DEXTROSE AND SODIUM CHLORIDE: 5; 450 INJECTION, SOLUTION INTRAVENOUS at 04:07

## 2020-01-01 RX ADMIN — SODIUM CHLORIDE: 9 INJECTION, SOLUTION INTRAVENOUS at 00:06

## 2020-01-01 RX ADMIN — PIPERACILLIN SODIUM AND TAZOBACTAM SODIUM 2.25 G: 2; .25 INJECTION, POWDER, LYOPHILIZED, FOR SOLUTION INTRAVENOUS at 22:06

## 2020-01-01 RX ADMIN — PIPERACILLIN AND TAZOBACTAM 3.38 G: 3; .375 INJECTION, POWDER, FOR SOLUTION INTRAVENOUS at 04:22

## 2020-01-01 RX ADMIN — AMIODARONE HYDROCHLORIDE 1 MG/MIN: 50 INJECTION, SOLUTION INTRAVENOUS at 21:12

## 2020-01-01 RX ADMIN — AMIODARONE HYDROCHLORIDE 0.5 MG/MIN: 50 INJECTION, SOLUTION INTRAVENOUS at 06:23

## 2020-01-01 RX ADMIN — DEXTROSE MONOHYDRATE 25 ML: 25 INJECTION, SOLUTION INTRAVENOUS at 23:38

## 2020-01-01 RX ADMIN — POTASSIUM CHLORIDE 10 MEQ: 7.46 INJECTION, SOLUTION INTRAVENOUS at 14:25

## 2020-01-01 RX ADMIN — IPRATROPIUM BROMIDE AND ALBUTEROL SULFATE 3 ML: .5; 3 SOLUTION RESPIRATORY (INHALATION) at 08:56

## 2020-01-01 RX ADMIN — QUETIAPINE 25 MG: 25 TABLET ORAL at 06:30

## 2020-01-01 RX ADMIN — BISACODYL 10 MG: 10 SUPPOSITORY RECTAL at 11:41

## 2020-01-01 RX ADMIN — PIPERACILLIN SODIUM AND TAZOBACTAM SODIUM 2.25 G: 2; .25 INJECTION, POWDER, LYOPHILIZED, FOR SOLUTION INTRAVENOUS at 04:07

## 2020-01-01 RX ADMIN — BISACODYL 10 MG: 10 SUPPOSITORY RECTAL at 09:30

## 2020-01-01 RX ADMIN — PIPERACILLIN AND TAZOBACTAM 3.38 G: 3; .375 INJECTION, POWDER, FOR SOLUTION INTRAVENOUS at 16:53

## 2020-01-01 RX ADMIN — DEXTROSE MONOHYDRATE 25 ML: 25 INJECTION, SOLUTION INTRAVENOUS at 06:44

## 2020-01-01 RX ADMIN — POTASSIUM CHLORIDE 10 MEQ: 7.46 INJECTION, SOLUTION INTRAVENOUS at 12:23

## 2020-01-01 RX ADMIN — AMIODARONE HYDROCHLORIDE 0.5 MG/MIN: 50 INJECTION, SOLUTION INTRAVENOUS at 22:49

## 2020-01-01 RX ADMIN — IPRATROPIUM BROMIDE AND ALBUTEROL SULFATE 3 ML: .5; 3 SOLUTION RESPIRATORY (INHALATION) at 12:18

## 2020-01-01 RX ADMIN — LEVALBUTEROL HYDROCHLORIDE 0.3 MG: 1.25 SOLUTION, CONCENTRATE RESPIRATORY (INHALATION) at 07:19

## 2020-01-01 RX ADMIN — AMIODARONE HYDROCHLORIDE 0.5 MG/MIN: 50 INJECTION, SOLUTION INTRAVENOUS at 06:22

## 2020-01-01 RX ADMIN — LEVALBUTEROL HYDROCHLORIDE 0.3 MG: 1.25 SOLUTION, CONCENTRATE RESPIRATORY (INHALATION) at 11:21

## 2020-01-01 RX ADMIN — PIPERACILLIN AND TAZOBACTAM 3.38 G: 3; .375 INJECTION, POWDER, FOR SOLUTION INTRAVENOUS at 10:16

## 2020-01-01 RX ADMIN — SODIUM CHLORIDE 1000 ML: 9 INJECTION, SOLUTION INTRAVENOUS at 19:24

## 2020-01-01 RX ADMIN — PIPERACILLIN AND TAZOBACTAM 3.38 G: 3; .375 INJECTION, POWDER, FOR SOLUTION INTRAVENOUS at 17:16

## 2020-01-01 RX ADMIN — SODIUM CHLORIDE, POTASSIUM CHLORIDE, SODIUM LACTATE AND CALCIUM CHLORIDE: 600; 310; 30; 20 INJECTION, SOLUTION INTRAVENOUS at 21:15

## 2020-01-01 RX ADMIN — PIPERACILLIN SODIUM AND TAZOBACTAM SODIUM 2.25 G: 2; .25 INJECTION, POWDER, LYOPHILIZED, FOR SOLUTION INTRAVENOUS at 05:38

## 2020-01-01 RX ADMIN — PIPERACILLIN SODIUM AND TAZOBACTAM SODIUM 2.25 G: 2; .25 INJECTION, POWDER, LYOPHILIZED, FOR SOLUTION INTRAVENOUS at 15:01

## 2020-01-01 RX ADMIN — LEVALBUTEROL HYDROCHLORIDE 0.3 MG: 1.25 SOLUTION, CONCENTRATE RESPIRATORY (INHALATION) at 11:31

## 2020-01-01 RX ADMIN — FUROSEMIDE 20 MG: 10 INJECTION, SOLUTION INTRAMUSCULAR; INTRAVENOUS at 17:16

## 2020-01-01 RX ADMIN — QUETIAPINE 25 MG: 25 TABLET ORAL at 15:01

## 2020-01-01 RX ADMIN — Medication 10 MEQ: at 01:11

## 2020-01-01 RX ADMIN — Medication 10 MEQ: at 04:30

## 2020-01-01 RX ADMIN — ACETAMINOPHEN 650 MG: 650 SUPPOSITORY RECTAL at 20:17

## 2020-01-01 RX ADMIN — SODIUM CHLORIDE, POTASSIUM CHLORIDE, SODIUM LACTATE AND CALCIUM CHLORIDE 500 ML: 600; 310; 30; 20 INJECTION, SOLUTION INTRAVENOUS at 15:01

## 2020-01-01 RX ADMIN — HYDROMORPHONE HYDROCHLORIDE 1 MG: 1 INJECTION, SOLUTION INTRAMUSCULAR; INTRAVENOUS; SUBCUTANEOUS at 18:26

## 2020-01-01 RX ADMIN — LEVALBUTEROL HYDROCHLORIDE 0.3 MG: 1.25 SOLUTION, CONCENTRATE RESPIRATORY (INHALATION) at 23:32

## 2020-01-01 RX ADMIN — LEVALBUTEROL HYDROCHLORIDE 0.3 MG: 1.25 SOLUTION, CONCENTRATE RESPIRATORY (INHALATION) at 02:48

## 2020-01-01 RX ADMIN — DEXTROSE AND SODIUM CHLORIDE: 5; 450 INJECTION, SOLUTION INTRAVENOUS at 15:59

## 2020-01-01 RX ADMIN — BISACODYL 10 MG: 10 SUPPOSITORY RECTAL at 00:53

## 2020-01-01 RX ADMIN — LORAZEPAM 1 MG: 2 INJECTION INTRAMUSCULAR; INTRAVENOUS at 18:38

## 2020-01-01 RX ADMIN — SODIUM CHLORIDE 500 ML: 9 INJECTION, SOLUTION INTRAVENOUS at 21:45

## 2020-01-01 RX ADMIN — FUROSEMIDE 20 MG: 10 INJECTION, SOLUTION INTRAMUSCULAR; INTRAVENOUS at 09:04

## 2020-01-01 RX ADMIN — PIPERACILLIN AND TAZOBACTAM 3.38 G: 3; .375 INJECTION, POWDER, FOR SOLUTION INTRAVENOUS at 19:39

## 2020-01-01 RX ADMIN — IPRATROPIUM BROMIDE AND ALBUTEROL SULFATE 3 ML: .5; 3 SOLUTION RESPIRATORY (INHALATION) at 15:33

## 2020-01-01 RX ADMIN — AMIODARONE HYDROCHLORIDE 0.5 MG/MIN: 50 INJECTION, SOLUTION INTRAVENOUS at 03:03

## 2020-01-01 RX ADMIN — PIPERACILLIN SODIUM AND TAZOBACTAM SODIUM 2.25 G: 2; .25 INJECTION, POWDER, LYOPHILIZED, FOR SOLUTION INTRAVENOUS at 21:13

## 2020-01-01 RX ADMIN — SODIUM CHLORIDE, POTASSIUM CHLORIDE, SODIUM LACTATE AND CALCIUM CHLORIDE 250 ML: 600; 310; 30; 20 INJECTION, SOLUTION INTRAVENOUS at 06:45

## 2020-01-01 RX ADMIN — POTASSIUM CHLORIDE, DEXTROSE MONOHYDRATE AND SODIUM CHLORIDE: 150; 5; 450 INJECTION, SOLUTION INTRAVENOUS at 09:30

## 2020-01-01 RX ADMIN — POTASSIUM CHLORIDE 10 MEQ: 7.46 INJECTION, SOLUTION INTRAVENOUS at 15:28

## 2020-01-01 RX ADMIN — SODIUM CHLORIDE 1000 ML: 9 INJECTION, SOLUTION INTRAVENOUS at 19:04

## 2020-01-01 ASSESSMENT — ACTIVITIES OF DAILY LIVING (ADL)
ADLS_ACUITY_SCORE: 21
ADLS_ACUITY_SCORE: 19
ADLS_ACUITY_SCORE: 21
ADLS_ACUITY_SCORE: 21
ADLS_ACUITY_SCORE: 19
ADLS_ACUITY_SCORE: 18
ADLS_ACUITY_SCORE: 19
ADLS_ACUITY_SCORE: 19
ADLS_ACUITY_SCORE: 21
ADLS_ACUITY_SCORE: 21
ADLS_ACUITY_SCORE: 19

## 2020-01-02 NOTE — TELEPHONE ENCOUNTER
"Ibuprofen Oral Tablet 600 MG  Last Written Prescription Date:  12/03/219  Last Fill Quantity: 30,  # refills: 0   Last office visit: 11/4/2019 with prescribing provider:  LUIS ANTONIO   Future Office Visit:  Nothing at this time scheduled.    Requested Prescriptions   Pending Prescriptions Disp Refills     ibuprofen (ADVIL/MOTRIN) 600 MG tablet [Pharmacy Med Name: Ibuprofen Oral Tablet 600 MG] 30 tablet 0     Sig: Take 1 tablet (600 mg) by mouth every 8 hours as needed for moderate pain       NSAID Medications Failed - 1/2/2020  2:23 PM        Failed - Normal ALT on file in past 12 months     Recent Labs   Lab Test 01/29/18  1114   ALT 13             Failed - Normal AST on file in past 12 months     Recent Labs   Lab Test 01/29/18  1114   AST 18             Failed - Patient is age 6-64 years        Failed - Normal CBC on file in past 12 months     Recent Labs   Lab Test 10/28/19  2210   WBC 9.2   RBC 3.76*   HGB 11.8*   HCT 35.2*                    Passed - Blood pressure under 140/90 in past 12 months     BP Readings from Last 3 Encounters:   11/04/19 120/77   10/30/19 121/72   10/28/19 116/73                 Passed - Recent (12 mo) or future (30 days) visit within the authorizing provider's specialty     Patient has had an office visit with the authorizing provider or a provider within the authorizing providers department within the previous 12 mos or has a future within next 30 days. See \"Patient Info\" tab in inbasket, or \"Choose Columns\" in Meds & Orders section of the refill encounter.              Passed - Medication is active on med list        Passed - Normal serum creatinine on file in past 12 months     Recent Labs   Lab Test 10/28/19  2210  06/20/19  1135   CR 1.09   < >  --    CREAT  --   --  1.0    < > = values in this interval not displayed.             "

## 2020-03-02 PROBLEM — A41.9 SEVERE SEPSIS (H): Status: ACTIVE | Noted: 2020-01-01

## 2020-03-02 PROBLEM — R65.20 SEVERE SEPSIS (H): Status: ACTIVE | Noted: 2020-01-01

## 2020-03-03 NOTE — PROGRESS NOTES
Intensivist consult:    We are asked by Dr. Munoz of the hospitalist service to see the patient for hypotension and respiratory failure.    CC:  Dyspnea    HPI:  88M DNR/DNI h/o HLD, HTN now admitted 3/2 for an sbo and significant nausea/vomiting.  Was in respiratory failure requiring bipap with b/l infiltrates in lower lobes on ct chest.  These were interpreted as pulmonary edema and the patient was diuresed; he is now somewhat hypotensive.  He is fairly confused on my visit and this limits history taking.  Overall, though, he seems to deny pain and dyspnea.  He now has an ng tube and his nausea/vomiting has improved with this.    ROS:  Unable due to confusion noted above.    PE:  BP (!) 75/43   Pulse 107   Temp 98.7  F (37.1  C) (Axillary)   Resp (!) 40   Wt 59.7 kg (131 lb 9.8 oz)   SpO2 95%   BMI 20.61 kg/m    Gen:  No acute distress // HEENT:  PERRL, nose/ears grossly normal // Neck:  Supple // Lymph : no cervical adenopathy // chest : CTA-B, unlabored on bipap // cor:  rrr no m/r/g // abd s/nt/nd // extr:  wwp x4, no edema // neuro:  Awake, alert, able to communicate but responses not always appropriate to situation/question, good str/sens x4 // skin:  No obvious rash    Data (personally reviewed):  Lytes ok; creat 2.52 downtrending;  Lactate 2.7 downtrending;   hgb 13.1 ok; wbc 4.2; pltlts 187 ok.    CT chest/abd/pelvis (personally reviewed):  Sbo.  Scattered b/l pulmonary infiltrates.    A/P:  1.  Hypotension:  Suspect sepsis which may be developing into septic shock.  Would initiate resuscitation with IV fluid.  No further diuretic for now.  On appropriate coverage with zoysn.  Lactate downtrending.    2.  Acute hypoxemic respiratory failure, bipap dependent:  In setting of probable aspiration pna.  Hospitalist h/p suggests possible COVID-19, but this is extremely unlikely as the patient does not have fever, influenza-like symptoms, or a travel/exposure history consistent with this illness.  Droplet  precautions are therefore sufficient for now.  Continue bipap.  On zosyn.  Sputum cx if produces. Given the clinical picture, aspiration pneumonia is tremendously more likely.  Appreciate ID input.    3.  SBO:  Appreciate surgery support.  1200 ml drained from NG tube.  NPO for now.  Fluids and abx as noted.  Appreciate surgery support.    4.  DULCE:  Creat elevated but improving.  Good UOP.      Intensivist service will follow with you.    Critical care time 45 min, exclusive of procedures.    Past Medical History:   Diagnosis Date     Essential hypertension, benign      Inguinal hernia without mention of obstruction or gangrene, unilateral or unspecified, (not specified as recurrent) 6/2013    RIght     Macular degeneration      Macular degeneration (senile) of retina, unspecified     bilat     Pure hypercholesterolemia      Retinal neovascularization NOS     L     Past Surgical History:   Procedure Laterality Date     HC REMOVE TONSILS/ADENOIDS,<11 Y/O  age 8     HC REPAIR ING HERNIA 5+ YO,BALTAZAR  8/04    L, recurrent with small bowel incarceration     HC REPAIR ING HERNIA,5+Y/O,REDUCIBL      L     HC TOOTH EXTRACTION W/FORCEP       LAPAROSCOPIC HERNIORRHAPHY INGUINAL  7/12/2013    Procedure: LAPAROSCOPIC HERNIORRHAPHY INGUINAL;  Laparoscopic Right Inguinal Hernia Repair with Mesh ;  Surgeon: Martin Quinteros MD;  Location:  OR     LAPAROSCOPIC HERNIORRHAPHY INGUINAL BILATERAL Left 10/11/2019    Procedure: LAPAROSCOPIC LEFT INGUINAL HERNIA REPAIR WITH MESH POSSIBLE OPEN;  Surgeon: Tariq Garcia MD;  Location:  OR     LASER SURGERY OF EYE  8/04    L retina laser therapy for neovascular membrane     Current Facility-Administered Medications   Medication     acetaminophen (TYLENOL) tablet 650 mg    Or     acetaminophen (TYLENOL) solution 650 mg     acetaminophen (TYLENOL) Suppository 650 mg     albuterol (PROVENTIL) neb solution 2.5 mg     artificial tears (GENTEAL) 0.1-0.2-0.3 % ophthalmic solution  1 drop     dextrose 5% and 0.45% NaCl infusion     glucose gel 15-30 g    Or     dextrose 50 % injection 25-50 mL    Or     glucagon injection 1 mg     HYDROmorphone (PF) (DILAUDID) injection 0.3-0.5 mg     ipratropium - albuterol 0.5 mg/2.5 mg/3 mL (DUONEB) neb solution 3 mL     lactated ringers BOLUS 500 mL     melatonin tablet 1 mg     naloxone (NARCAN) injection 0.1-0.4 mg     No lozenges or gum should be given while patient on BIPAP/AVAPS/AVAPS AE     ondansetron (ZOFRAN-ODT) ODT tab 4 mg    Or     ondansetron (ZOFRAN) injection 4 mg     Patient may continue current oral medications     piperacillin-tazobactam (ZOSYN) 2.25 g vial to attach to  ml bag     prochlorperazine (COMPAZINE) injection 5 mg    Or     prochlorperazine (COMPAZINE) tablet 5 mg    Or     prochlorperazine (COMPAZINE) Suppository 12.5 mg     QUEtiapine (SEROquel) tablet 25 mg        Allergies   Allergen Reactions     Atenolol Swelling     Fast pulse. Patient does not remember getting this medication.     Social History     Socioeconomic History     Marital status: Single     Spouse name: Linda Alvares     Number of children: 3     Years of education: 20     Highest education level: Not on file   Occupational History     Occupation: retired     Comment:  - [employer]   Social Needs     Financial resource strain: Not on file     Food insecurity:     Worry: Not on file     Inability: Not on file     Transportation needs:     Medical: Not on file     Non-medical: Not on file   Tobacco Use     Smoking status: Former Smoker     Packs/day: 0.00     Last attempt to quit: 3/5/1979     Years since quittin.0     Smokeless tobacco: Never Used   Substance and Sexual Activity     Alcohol use: No     Comment: rare red wine     Drug use: No     Sexual activity: Yes     Partners: Female     Birth control/protection: Rhythm   Lifestyle     Physical activity:     Days per week: Not on file     Minutes per session: Not on file     Stress:  Not on file   Relationships     Social connections:     Talks on phone: Not on file     Gets together: Not on file     Attends Judaism service: Not on file     Active member of club or organization: Not on file     Attends meetings of clubs or organizations: Not on file     Relationship status: Not on file     Intimate partner violence:     Fear of current or ex partner: Not on file     Emotionally abused: Not on file     Physically abused: Not on file     Forced sexual activity: Not on file   Other Topics Concern      Service No     Blood Transfusions No     Caffeine Concern Yes     Comment: one cup of coffee per day     Occupational Exposure No     Hobby Hazards No     Sleep Concern No     Stress Concern No     Weight Concern No     Special Diet No     Back Care No     Exercise Yes     Bike Helmet No     Seat Belt Yes     Self-Exams No     Parent/sibling w/ CABG, MI or angioplasty before 65F 55M? No   Social History Narrative    Social Documentation:12/09        Balanced Diet: YES    Calcium intake: milk and food per day    Caffeine: 1 small per day    Exercise:  type of activity bike    Sunscreen: No    Seatbelts:  Yes    Self Breast Exam:  NA    Self Testicular Exam: Yes    Physical/Emotional/Sexual Abuse: No    Do you feel safe in your environment? Yes        Cholesterol screen up to date: Yes    Eye Exam up to date: Yes    Dental Exam up to date: Yes    Pap smear up to date: Does Not Apply    Mammogram up to date: Does Not Apply    Dexa Scan up to date: Does Not Apply    Colonoscopy up to date: declined    Immunizations up to date: No unsure    Glucose screen if over 40:  Yes    Sreekanth Garcia ma                         Family History   Problem Relation Age of Onset     Cancer Mother         colon     Cerebrovascular Disease Father         ruptured anuerysm     Hypertension Father

## 2020-03-03 NOTE — PLAN OF CARE
Alert, confused/forgetful at times. Oxygen demands increased through out the night, currently on HFNC at 100% and 45L. Tachypneic. Amish patent. NPO ex ice chips. Blood sugar on arrival was 59, dextrose given has been within normal range since. Will continue to monitor.

## 2020-03-03 NOTE — CONSULTS
[facility]  General Surgery Consultation         Tray Mckenzie MD    Bhavesh Schafer MRN# [id number]   YOB: 1931 Age: 88 year old      Date of Admission:  3/2/2020  Date of Consult: 3/3/2020         Assessment and Plan:   Patient is a 88 year old male with small bowel obstruction    PLAN:  I have personally reviewed all imaging studies which show a probable small bowel obstruction with transition point near a previous small bowel resection.  The patient likely has aspiration pneumonitis and sepsis and is currently in the ICU.  His abdominal exam shows distention without tenderness so there is not a need for urgent operative intervention.  I would recommend placing an NG tube now given the distention seen on imaging.  He is being treated for his medical issues at this time.  If he develops worsening symptoms or fails to respond to ongoing management he may require exploration. This would be high risk given his current status and long term treatment goals would need to be discussed.         Chief Complaint:     Chief Complaint   Patient presents with     Generalized Weakness     Nausea & Vomiting          History of Present Illness:   Patient is a 88 year old male who I was asked to see by Dr. Boykin for evaluation of a possible small bowel obstruction.  The patient lives independently and was found to be very weak and was brought to the ER by EMS.  He started having nausea and vomiting yesterday.  He appeared to be very dehydrated and weak.  He has a history of a subsegmental small bowel obstruction for an incarcerated inguinal hernia in 2004.  A CT showed a bowel obstruction with possible transition near this area.  The patient became hypoxemic so was transferred to the ICU.  He has been stable overnight on oxygen.  He denies having a bowel movement or passing gas.  He complains of nausea at this time.  Patient denies fevers, chills, changes in stool or urine, headache, SOB,  chest pain.          Physical Exam:   Blood pressure 95/63, pulse 101, temperature 98.7  F (37.1  C), temperature source Axillary, resp. rate (!) 36, weight 59.7 kg (131 lb 9.8 oz), SpO2 95 %.  131 lbs 9.83 oz  General: Generally appears well.  Psych: Alert and Oriented.  Normal affect  Neurological: grossly intact  Eyes: Sclera clear  Respiratory:  Lungs with good air excursion  Cardiovascular:  Normal peripheral pulses  GI: Abdomen mild distention.  Soft without significant tenderness.  No rebound or guarding.  Lymphatic/Hematologic/Immune:  No obvious lymphadenopathy.  Integumentary:  No rashes       Past Medical History:     Past Medical History:   Diagnosis Date     Essential hypertension, benign      Inguinal hernia without mention of obstruction or gangrene, unilateral or unspecified, (not specified as recurrent) 6/2013    RIght     Macular degeneration      Macular degeneration (senile) of retina, unspecified     bilat     Pure hypercholesterolemia      Retinal neovascularization NOS     L          Past Surgical History:     Past Surgical History:   Procedure Laterality Date     HC REMOVE TONSILS/ADENOIDS,<11 Y/O  age 8     HC REPAIR ING HERNIA 5+ YO,BALTAZAR  8/04    L, recurrent with small bowel incarceration     HC REPAIR ING HERNIA,5+Y/O,REDUCIBL      L     HC TOOTH EXTRACTION W/FORCEP       LAPAROSCOPIC HERNIORRHAPHY INGUINAL  7/12/2013    Procedure: LAPAROSCOPIC HERNIORRHAPHY INGUINAL;  Laparoscopic Right Inguinal Hernia Repair with Mesh ;  Surgeon: Martin Quinteros MD;  Location:  OR     LAPAROSCOPIC HERNIORRHAPHY INGUINAL BILATERAL Left 10/11/2019    Procedure: LAPAROSCOPIC LEFT INGUINAL HERNIA REPAIR WITH MESH POSSIBLE OPEN;  Surgeon: Tariq Garcia MD;  Location:  OR     LASER SURGERY OF EYE  8/04    L retina laser therapy for neovascular membrane          Current Medications:           ipratropium - albuterol 0.5 mg/2.5 mg/3 mL  3 mL Nebulization 4x daily      piperacillin-tazobactam  2.25 g Intravenous Q6H     acetaminophen **OR** acetaminophen, acetaminophen, albuterol, artificial tears ophthalmic solution, glucose **OR** dextrose **OR** glucagon, HYDROmorphone, melatonin, naloxone, - MEDICATION INSTRUCTIONS -, ondansetron **OR** ondansetron, - MEDICATION INSTRUCTIONS -, prochlorperazine **OR** prochlorperazine **OR** prochlorperazine       Home Medications:     Prior to Admission medications    Medication Sig Last Dose Taking? Auth Provider   acetaminophen (TYLENOL) 325 MG tablet Take 1-2 tablets (325-650 mg) by mouth every 6 hours as needed for pain   Katrin Guerrero PA-C   cyclobenzaprine (FLEXERIL) 5 MG tablet Take 1 tablet (5 mg) by mouth 2 times daily as needed (muscle pain)   Blayne Velasquez MD   ibuprofen (ADVIL/MOTRIN) 600 MG tablet Take 1 tablet (600 mg) by mouth every 8 hours as needed for moderate pain   Eugene Maki MD   lisinopril-hydrochlorothiazide (PRINZIDE/ZESTORETIC) 20-25 MG tablet Take 1 tablet by mouth daily   Eugene Maki MD   order for DME Equipment being ordered: Walker Wheels () and Walker ()  Treatment Diagnosis: difficulty walking   Harjinder Godinez MD   order for DME Equipment being ordered: Walker Wheels () and Walker ()  Treatment Diagnosis: difficulty walking   Harjinder Godinez MD   oxyCODONE (ROXICODONE) 5 MG tablet Take 1 tablet (5 mg) by mouth 2 times daily as needed for moderate to severe pain   Eugene Maki MD   polyethylene glycol (MIRALAX/GLYCOLAX) packet Take 1 packet by mouth daily   Unknown, Entered By History   psyllium (METAMUCIL/KONSYL) Packet Take 1 packet by mouth daily   Unknown, Entered By History   tamsulosin (FLOMAX) 0.4 MG capsule Take 1 capsule (0.4 mg) by mouth daily   Blayne Velasquez MD          Allergies:     Allergies   Allergen Reactions     Atenolol Swelling     Fast pulse. Patient does not remember getting this medication.          Family  History:     Family History   Problem Relation Age of Onset     Cancer Mother         colon     Cerebrovascular Disease Father         ruptured anuerysm     Hypertension Father          Social History:   Bhavesh Schafer  reports that he quit smoking about 41 years ago. He smoked 0.00 packs per day. He has never used smokeless tobacco. He reports that he does not drink alcohol or use drugs.        Review of Systems:   The 12 point Review of Systems is negative other than noted in the HPI.       Labs/Imaging   All new lab and imaging data was reviewed.   Tray Mckenzie M.D.  [facility]

## 2020-03-03 NOTE — PROGRESS NOTES
Pt requiring 15 lpm oxymask, SpO2 88-90%. Transitioned to HFNC 45L 100%. SpO2 92%. BBS coarse/crackles. Will continue to follow.

## 2020-03-03 NOTE — PROGRESS NOTES
"[facility]    Hospitalist Progress Note    Interval History   - Oriented to self and place and year, but confused, asking repeatedly to go home and not understanding why he can't.  - Long discussion with daughter Joy, discussed with her that patient is critically ill and looks like he needs pressor support. She would like patient to have pressors. She notes that multiple family members will be able to come by tomorrow.    Assessment & Plan   Summary: Bhavesh Schafer is a 88 year old male with PMH of HTN, dyslipidemia, BPH, hx resection for incarcerated bowel in left inguinal hernia in 2004, who was admitted on 3/2/2020 with severe sepsis, small bowel obstruction, and pneumonia.    Septic shock  Pneumonia, bacterial versus viral  Possible viral respiratory infection  Acute metabolic encephalopathy  Lactic acidosis  Precipitous decline over the past 24 hours prior to admission with symptoms of vomiting, weakness cough with thick sputum. Influenza A/B PCR negative. Meets criteria for severe sepsis with hypotension (78/59), leukopenia (WBC 2.7), hypoxia (O2 sat 85% on room air), HR>90. Lactate 7.2-->3.2 after 2 L fluid given. CT chest showed SBO as well as \"patchy ground glass opacities throughout both lungs\" so no definite area of focal aspiration. Discussed with ID, low clinical suspicion for COVID-19 status of living alone and presenting with a bowel obstruction. Lactate improved to 2.7 on 3/3/2020.   - Droplet precautions  - Appreciate ID consult   - Continue Zosyn  - D5 1/2NS @ 125ml/hr  - Wean O2 as able  - Scheduled duonebs QID + albuterol nebs PRN    Small Bowel Obstruction  No abdominal pain on exam but CT imaging shows mid small bowel transition point near post operative changes in left lower quadrant anteriorly. Surgical history of segmental resection for incarcerated bowel in left inguinal hernia in 2004 in [facility] system. Not known when last colonoscopy occurred. Lactate is markedly " elevated--unclear how much of this is due to his SBO and how much is due to his sepsis above.  -NPO except ice chips  - Appreciate surgery consult   - NG tube    Possible volume overload: Patient with some CT findings of lower lobe infiltrates, but requiring significant amount of O2, concern for volume overload. Initially on 3/2/2020, urine output was poor, and so Lasix 20mg IV given, without significant benefit, so fluids were restarted. On 3/3, patient again needing BiPap, continues to have bilateral infiltrates, lactate down to 2.7, NT pro BNP is elevated to 19k, but UOP has improved to about 60ml/hr. Will cut down patient's IVF for approx 4 hours.  - Stop IVF now  - Restart D5 1/2NS @ 75ml/hr at 6PM    Acute Renal Failure  BPH  Baseline creatinine 0.97, Cr 3.3 on admission. Likely prerenal versus ATN in setting of hypovolemia, sepsis.  - See above for IVF  - Continue fitch for strict I&O's    Hypertension: Hold lisinopril-hydrochlorothiazide for now    Hypoglycemia: No known diabetes. Hypoglycemia protocol    DVT Prophylaxis: Pneumatic Compression Devices  Code Status: DNR/DNI  PT/OT: not now    Disposition: Expected discharge 3+ days    Guillermo Munoz MD  Text Page  (7am to 6pm)  -Data reviewed today: I reviewed all new labs and imaging results over the last 24 hours.    Physical Exam   Temp: 98.7  F (37.1  C) Temp src: Axillary BP: (!) 75/43 Pulse: 107 Heart Rate: 103 Resp: (!) 40 SpO2: 95 % O2 Device: BiPAP/CPAP Oxygen Delivery: 15 LPM  Vitals:    03/03/20 0000   Weight: 59.7 kg (131 lb 9.8 oz)     Vital Signs with Ranges  Temp:  [97.8  F (36.6  C)-98.7  F (37.1  C)] 98.7  F (37.1  C)  Pulse:  [] 107  Heart Rate:  [] 103  Resp:  [16-55] 40  BP: ()/(43-88) 75/43  FiO2 (%):  [100 %] 100 %  SpO2:  [86 %-100 %] 95 %  I/O last 3 completed shifts:  In: 304.17 [I.V.:304.17]  Out: 705 [Urine:705]  O2 requirements: yes    Constitutional: Elderly thin male in NAD  HEENT: Eyes nonicteric, oral  mucosa dry  Cardiovascular: Tachycardic, normal S1/2, no m/r/g  Respiratory: Ronchorous with crackles in both bases, no wheezing  Vascular: No LE pitting edema  GI: Normoactive bowel sounds, nontender, nondistended  Skin/Integumen: No rashes  Neuro/Psych: Mildly agitated, A&O to self, place, and year, perseverating on leaving the hospital    Medications     dextrose 5% and 0.45% NaCl + KCl 20 mEq/L 125 mL/hr at 03/03/20 0930     - MEDICATION INSTRUCTIONS -       - MEDICATION INSTRUCTIONS -         ipratropium - albuterol 0.5 mg/2.5 mg/3 mL  3 mL Nebulization 4x daily     piperacillin-tazobactam  2.25 g Intravenous Q6H       Data   Recent Labs   Lab 03/03/20  0537 03/02/20  1857   WBC 4.2 2.7*   HGB 13.1* 12.5*   MCV 93 94    194    135   POTASSIUM 4.4 3.8   CHLORIDE 103 99   CO2 20 22   BUN 64* 61*   CR 2.52* 2.87*   ANIONGAP 10 14   GRAY 8.5 9.5   GLC 71 94   ALBUMIN 3.0* 3.4   PROTTOTAL 6.7* 7.1   BILITOTAL 0.7 0.4   ALKPHOS 36* 48   ALT 54 43   * 64*   LIPASE  --  157   TROPI  --  <0.015       Imaging:   Recent Results (from the past 24 hour(s))   CT Chest Abdomen Pelvis w/o Contrast    Narrative    CT CHEST ABDOMEN PELVIS WITHOUT CONTRAST   3/2/2020 8:17 PM     HISTORY: Vomiting. Hypoxia.    TECHNIQUE: Volumetric helical sections were acquired from the thoracic  inlet to the ischial tuberosities without IV contrast. Coronal images  were also reconstructed. Radiation dose for this scan was reduced  using automated exposure control, adjustment of the mA and/or kV  according to patient size, or iterative reconstruction technique.    COMPARISON: CT of the abdomen and pelvis performed 6/20/2019.    FINDINGS:    Chest: Patchy groundglass and airspace opacities throughout both  lungs, greater on the right, are nonspecific, and could be related to  edema or infection. No pleural or pericardial effusions. No enlarged  lymph nodes are identified in the chest. Atherosclerotic calcification  of the  thoracic aorta and coronary arteries. Aneurysmal dilatation of  the ascending thoracic aorta measures 4.8 cm in diameter. The thoracic  aorta is tortuous.    Abdomen and Pelvis: Multiple hepatic cysts are again noted, with the  largest in the caudate lobe measuring 4.1 cm. Gallbladder is not seen,  and may be contracted or surgically absent. The spleen, adrenal  glands, and pancreas have unremarkable noncontrast appearances. Small  cysts are noted in the upper poles of both kidneys, with the largest  on the left measuring 4 cm. No hydronephrosis. Moderate  atherosclerotic aortoiliac calcification.    There are multiple prominently dilated fluid-filled loops of mid small  bowel in the lower abdomen anteriorly, with a probable transition  point near postoperative changes in the left lower quadrant (series 7  image 34). Findings are consistent with a mid small-bowel obstruction.  Several decompressed loops of distal small bowel are noted in the  right lower quadrant. The stomach is moderately distended with gas and  fluid. No convincing evidence for colitis or diverticulitis. The colon  is largely decompressed. No free fluid in the pelvis. No free  intraperitoneal air.    Moderate to marked compression of the T12 vertebral body has increased  since the previous exam, and is age-indeterminate. Multiple milder  vertebral body compressions are also noted in the thoracolumbar spine,  not significantly changed where visualized previously. Degenerative  changes are noted throughout the thoracolumbar spine.      Impression    IMPRESSION:   1. There is a mid small-bowel obstruction, with a transition point  near postoperative changes in the left lower quadrant anteriorly.  2. Patchy groundglass and airspace opacities throughout both lungs are  nonspecific, and could be related to edema or infection.  3. Aneurysmal dilatation of the ascending thoracic aorta measures 4.8  cm in diameter.  4. Moderate to marked compression of the  T12 vertebral body has  increased since the previous exam, and is age-indeterminate.          JORDIN DRAKE MD   XR Chest Port 1 View    Narrative    EXAM: XR CHEST PORT 1 VW  LOCATION: [facility]  DATE/TIME: 3/3/2020 5:30 AM    INDICATION: Abnormal CT.  COMPARISON: CT chest 03/02/2020.      Impression    IMPRESSION: Borderline heart size. Thoracic aorta is atherosclerotic and aneurysmal dilatation of the aortic knob. Small amount of bibasilar interstitial densities which may represent atelectasis or inflammatory process. No pneumothorax.   Demineralization.   XR Abdomen Port 1 View    Narrative    ABDOMEN ONE VIEW PORTABLE  3/3/2020 12:20 PM     HISTORY: rt. nare nasogastric tube placed at 70 cm    COMPARISON: 3/23'20 CT       Impression    IMPRESSION: An NG tube terminates appropriately with the sidehole in  the stomach. Basilar opacities are again seen and likely represent  scarring and airspace disease. No distended air-filled loops of small  bowel identified.    LESTER J FAHRNER, MD

## 2020-03-03 NOTE — PROVIDER NOTIFICATION
Received call from Millicent (phlebotomist) for critical lab.  Pro-calcitonin is greater than 200.  Texted results to Dr. Siegel (Hospitalist).

## 2020-03-03 NOTE — ED PROVIDER NOTES
"History   Chief Complaint:  weakness      HPI   history supplemented by patient acuity and because he is a poor historian, and supplemented by electronic chart review and discussion with EMS    Bhavesh Schafer is a 88 year old male who presents by EMS for evaluation of weakness.  He apparently started vomiting yesterday.  Today he felt more weak so he gradually lowered himself to the floor of his kitchen and called for help.  A female neighbor was apparently in his unit at the time of EMS arrival, who found him awake but with very dry mucous membranes.  His initial blood glucose was \"on the lower cusp of normal\" and he was given 250 cc of dextrose 25 solution.  Initial blood pressure was 70s over 40s and he was given 500 cc of saline with most recent blood pressure 107/54.  He denies any pain.  He was so weak that he was unable to get up off the floor.  His  stated that he had \"norovirus\".  No diarrhea.  Here he states that he needs to \"expectorate\".  He is DNR/DNI.  He states he has children in [place] but does not know how to reach them.  He gives consent for me to reach his significant other Linda.    Allergies:  Atenolol     Medications:    No current outpatient medications on file.      Past Medical History:    Past Medical History:   Diagnosis Date     Essential hypertension, benign      Inguinal hernia without mention of obstruction or gangrene, unilateral or unspecified, (not specified as recurrent) 6/2013     Macular degeneration      Macular degeneration (senile) of retina, unspecified      Pure hypercholesterolemia      Retinal neovascularization NOS        Patient Active Problem List    Diagnosis Date Noted     Severe sepsis (H) 03/02/2020     Priority: Medium     Closed compression fracture of lumbosacral spine (H) 10/28/2019     Priority: Medium     Inguinal hernia 10/12/2019     Priority: Medium     Left inguinal hernia 09/10/2019     Priority: Medium     Other closed nondisplaced fracture of " proximal end of left humerus, initial encounter 01/29/2018     Priority: Medium     History of depression 02/20/2014     Priority: Medium     Acute urinary retention 07/22/2013     Priority: Medium     Advanced directives, counseling/discussion 02/05/2013     Priority: Medium     Right inguinal hernia 02/05/2013     Priority: Medium     Iron deficiency anemia 08/29/2011     Priority: Medium     Hypertension goal BP (blood pressure) < 140/90 01/18/2011     Priority: Medium     HYPERLIPIDEMIA LDL GOAL <130 10/31/2010     Priority: Medium     Persistent insomnia 02/11/2010     Priority: Medium     Patient is followed by Eugene Maki for ongoing prescription of benzodiazepines.  All refills should be approved by this provider, or covering partner.    Medication(s): Ambien 5 mg.   Maximum quantity per month: #30/90 pills need to last at least 3 months  Clinic visit frequency required:  Q 6 months    Controlled substance agreement on file: No        Last [facility] website verification: 3/20/2018   [email or web address]           Retinal neovascularization 08/31/2004     Priority: Medium     L  Problem list name updated by automated process. Provider to review       Macular degeneration (senile) of retina 08/24/2004     Priority: Medium     bilat  Problem list name updated by automated process. Provider to review          Past Surgical History:    Past Surgical History:   Procedure Laterality Date     HC REMOVE TONSILS/ADENOIDS,<13 Y/O  age 8     HC REPAIR ING HERNIA 5+ YO,BALTAZAR  8/04    L, recurrent with small bowel incarceration     HC REPAIR ING HERNIA,5+Y/O,REDUCIBL      L     HC TOOTH EXTRACTION W/FORCEP       LAPAROSCOPIC HERNIORRHAPHY INGUINAL  7/12/2013    Procedure: LAPAROSCOPIC HERNIORRHAPHY INGUINAL;  Laparoscopic Right Inguinal Hernia Repair with Mesh ;  Surgeon: Martin Quinteros MD;  Location: [facility]     LAPAROSCOPIC HERNIORRHAPHY INGUINAL BILATERAL Left 10/11/2019    Procedure: LAPAROSCOPIC LEFT  INGUINAL HERNIA REPAIR WITH MESH POSSIBLE OPEN;  Surgeon: Tariq Garcia MD;  Location: [facility] OR     LASER SURGERY OF EYE  8/04    L retina laser therapy for neovascular membrane        Family History:    family history includes Cancer in his mother; Cerebrovascular Disease in his father; Hypertension in his father.    Social History:   reports that he quit smoking about 41 years ago. He smoked 0.00 packs per day. He has never used smokeless tobacco. He reports that he does not drink alcohol or use drugs.    Review of Systems  History limited by poor historian and due to acuity, so unable to obtain full ROS  Physical Exam     Patient Vitals for the past 24 hrs:   BP Temp Temp src Pulse Heart Rate Resp SpO2 Weight   03/02/20 2230 110/74 -- -- 93 94 16 92 % --   03/02/20 2130 96/64 -- -- 92 91 21 (!) 86 % --   03/02/20 2100 94/74 -- -- 91 87 29 (!) 89 % --   03/02/20 2015 (!) 87/67 97.8  F (36.6  C) Rectal 90 92 30 92 % --   03/02/20 1933 (!) 86/68 -- -- 86 113 20 -- --   03/02/20 1901 (!) 78/59 -- -- 85 90 22 90 % --   03/02/20 1900 (!) 78/59 -- -- 85 -- 22 90 % --      Physical Exam  General: Ill-appearing male recumbent in room 9  HENT: mucous membranes extremely dry  CV: regular rate, regular rhythm, no lower extremity edema  Resp: Very coarse breath sounds bilaterally, hypoxic on room air, occasional deep cough observed  GI: abdomen soft and mild diffuse tenderness, no guarding  Nontender external genitalia  MSK: no bony tenderness, no CVAT  Skin: appropriately warm and dry  Neuro: alert, clear speech, oriented though poor historian, [place],  equal, lifts both legs off bed but diffusely weak  Psych: cooperates as able    Emergency Department Course     ECG taken at 1857, ECG read at 1900 by ALEXIS Gallo MD  Sinus rhythm, 1st degree AV block is old  Rate 91 bpm. WV interval 226. QRS duration 86. QTc 450      Imaging:    CT Chest Abdomen Pelvis w/o Contrast   Final Result   IMPRESSION:    1. There is a mid  small-bowel obstruction, with a transition point   near postoperative changes in the left lower quadrant anteriorly.   2. Patchy groundglass and airspace opacities throughout both lungs are   nonspecific, and could be related to edema or infection.   3. Aneurysmal dilatation of the ascending thoracic aorta measures 4.8   cm in diameter.   4. Moderate to marked compression of the T12 vertebral body has   increased since the previous exam, and is age-indeterminate.              JORDIN DRAKE MD      XR Chest Port 1 View    (Results Pending)        Laboratory:    Labs Ordered and Resulted from Time of ED Arrival Up to the Time of Departure from the ED   CBC WITH PLATELETS DIFFERENTIAL - Abnormal; Notable for the following components:       Result Value    WBC 2.7 (*)     RBC Count 4.13 (*)     Hemoglobin 12.5 (*)     Hematocrit 38.8 (*)     Absolute Lymphocytes 0.1 (*)     All other components within normal limits   COMPREHENSIVE METABOLIC PANEL - Abnormal; Notable for the following components:    Urea Nitrogen 61 (*)     Creatinine 2.87 (*)     GFR Estimate 19 (*)     GFR Estimate If Black 22 (*)     AST 64 (*)     All other components within normal limits   LACTIC ACID WHOLE BLOOD - Abnormal; Notable for the following components:    Lactic Acid 7.2 (*)     All other components within normal limits   CREATININE POCT - Abnormal; Notable for the following components:    Creatinine 3.3 (*)     GFR Estimate 18 (*)     GFR Estimate If Black 22 (*)     All other components within normal limits   ROUTINE UA WITH MICROSCOPIC - Abnormal; Notable for the following components:    Glucose Urine 30 (*)     Ketones Urine 5 (*)     Blood Urine Large (*)     Protein Albumin Urine 100 (*)     Leukocyte Esterase Urine Small (*)     WBC Urine 17 (*)     RBC Urine 9 (*)     Mucous Urine Present (*)     All other components within normal limits   LACTIC ACID WHOLE BLOOD - Abnormal; Notable for the following components:    Lactic Acid  3.2 (*)     All other components within normal limits   LIPASE   TROPONIN I   GLUCOSE MONITOR NURSING POCT   GLUCOSE BY METER   PERIPHERAL IV CATHETER   ISTAT CREATININE NURSING POCT   CARDIAC CONTINUOUS MONITORING   PULSE OXIMETRY NURSING   PERIPHERAL IV CATHETER   BLOOD CULTURE   BLOOD CULTURE   INFLUENZA A/B ANTIGEN   URINE CULTURE AEROBIC BACTERIAL      Interventions:  Zosyn IV  3L NS IV  Supplemental oxygen by facemask       Emergency Department Course:  Past medical records, nursing notes, and vitals reviewed.  I performed an exam of the patient and obtained history, as documented above.    I performed electronic chart review in [facility].    The patient was placed on continuous cardiac and pulse ox monitoring.    At 1914, I was notified of his elevated lactic acid level.    I rechecked the patient at that time.  At 1918, I left a message with his patient significant other Linda.  She called back and I spoke with her on the phone at 2000.  She will call the patient's daughter and asked her to call me in the emergency department to further discuss.  I ordered a repeat lactic acid level to be drawn at 2100.    I spoke with the nurse on multiple occasions to coordinate IV fluids and broad-spectrum IV antibiotics as well as supplemental oxygen.  I requested a rectal temperature.    At 2017, I spoke with the patient's daughter Jeanne who lives in [place].  Her phone number is I updated her on [phone].  CT findings suspicious for small bowel obstruction as well as respiratory failure requiring facemask oxygen, corresponding need for hospitalization in the ICU.    I rechecked the patient at 2112.  Blood pressures have been steadily improving and he is responding well to IV fluids.  At 2135, I spoke with the accepting hospitalist, Dr. GIDEON Boykin.    I rechecked the patient. Findings and plan explained to the Patient. Patient was admitted to ICU    Impression & Plan      CMS Diagnoses: The Lactic acid level is elevated  due to dehydration and respiratory failure, at this time there is no sign of severe sepsis or septic shock.   Medical Decision Making:  He was obviously ill upon first evaluation and was resuscitated aggressively with IV fluids and broad-spectrum IV antibiotics while an aggressive work-up was undertaken.  Initiated.  He has evidence of hypovolemic shock, supported by sustained blood pressures in the 70s that eventually responded to large volume IV fluid resuscitation.  Broad-spectrum IV antibiotics were administered empirically while awaiting further results of testing.  He has acute hypoxic respiratory failure that I think is likely due to the abnormal findings on CT, perhaps from aspiration.  His acute kidney injury did not allow for administration of IV contrast and my clinical suspicion for pulmonary embolism is very low.  He has significantly abnormal breath sounds.  I did not feel that BiPAP was indicated as a worried it may provoke further aspiration or increased gastric distention leading to further vomiting.  He does not show evidence of respiratory fatigue.  He is DNR/DNI.  His abdominal CT shows evidence of a small bowel obstruction.  I certainly considered the possibility of bowel ischemia, though his abdominal exam is not particularly worrisome and his lactic acid improved significantly on recheck.  He has not been vomiting here.  I think his elevated lactic acid is due primarily to profound dehydration with additional contribution from his respiratory failure, rather than being primarily from infection or bowel ischemia.  For this reason, I did not feel that immediate general surgery consultation was indicated, though clearly given this constellation of very worrisome findings which carry high morbidity and mortality especially in this somewhat frail 88-year-old, he requires ICU care.  He was admitted to the hospitalist service after discussion of the above concerns.    Diagnosis:    ICD-10-CM    1.  Hypovolemic shock (H) R57.1    2. Acute respiratory failure with hypoxia (H) J96.01 Blood culture     Blood culture     Lactic acid     Urine Culture Aerobic Bacterial     Glucose by meter     Glucose by meter     Fractional excretion of sodium     Glucose by meter     Glucose by meter   3. Small bowel obstruction (H) K56.609    4. Aspiration pneumonitis (H) J69.0     suspected   5. Elevated lactic acid level R79.89    6. Acute kidney injury (H) N17.9       Critical Care Time: Over 60 minutes, independent of procedures.  This included time spent obtaining a history and physical, reviewing the patient's chart, interpreting lab and imaging studies, re-examining the patient, coordinating care with other providers, and documenting ED care provided.  He has evidence of hypovolemic shock as well as acute hypoxic respiratory failure and acute kidney injury.  He has a profoundly elevated lactic acid level.  He was resuscitated aggressively with IV fluids and broad-spectrum antibiotics as well as supplemental oxygen through facemask that was titrated.  I reexamined him multiple times and coordinated care with multiple members of the ED staff and accepting service.  His condition carries very high morbidity mortality.      3/2/2020   Harjinder Gallo, *     This record was created at least in part using electronic voice recognition software, so please excuse any typographical errors.         Harjinder Gallo MD  03/03/20 0029       Harjinder Gallo MD  03/03/20 0030

## 2020-03-03 NOTE — PROGRESS NOTES
ICU Multi-Disciplinary Note  Patient condition reviewed and discussed while on multidisciplinary rounds today.   The Critical Care service will continue to follow peripherally while the patient is within the ICU. We are readily available should issues arise. Please feel free to contact us for critical care issues with which we may be of assistance. For all other concerns, please contact primary service first.     Symone Yoo, CNP

## 2020-03-03 NOTE — ED NOTES
Bed: ED09  Expected date:   Expected time:   Means of arrival:   Comments:  428 88M vomiting/dehydration/HTN/low O2

## 2020-03-03 NOTE — ED NOTES
[facility]  ED Nurse Handoff Report    ED Chief complaint: Generalized Weakness and Nausea & Vomiting      ED Diagnosis:   Final diagnoses:   None       Code Status: see admitting MD    Allergies:   Allergies   Allergen Reactions     Atenolol Swelling     Fast pulse. Patient does not remember getting this medication.       Patient Story: 88 year old male presents to the ED via EMS for fatigue, weakness and low O2 level. Pt denies falling but was down and too weak to get up.  Focused Assessment:  Pt is on 9 L of O2 via non rebreather to maintain 91 to 92 % O2 at this time.    Treatments and/or interventions provided: 3 L of NS and 3.375 g Zosyn, labs and CT  Patient's response to treatments and/or interventions: good    To be done/followed up on inpatient unit:  nothing noted at this time    Does this patient have any cognitive concerns?: none    Activity level - Baseline/Home:  Independent  Activity Level - Current:   Unknown    Patient's Preferred language: English   Needed?: No    Isolation: None  Infection: Not Applicable  Bariatric?: No    Vital Signs:   Vitals:    03/02/20 1900 03/02/20 1901 03/02/20 1933 03/02/20 2015   BP: (!) 78/59 (!) 78/59 (!) 86/68 (!) 87/67   Pulse: 85 85 86 90   Resp: 22 22 20 30   Temp:    97.8  F (36.6  C)   TempSrc:    Rectal   SpO2: 90% 90%  92%       Cardiac Rhythm:     Was the PSS-3 completed:   Yes  What interventions are required if any?               Family Comments: none  OBS brochure/video discussed/provided to patient/family: No              Name of person given brochure if not patient: na              Relationship to patient: na    For the majority of the shift this patient's behavior was Green.   Behavioral interventions performed were none.    ED NURSE PHONE NUMBER: [id number]

## 2020-03-03 NOTE — PLAN OF CARE
Worsening sats and agitiation during the day , pt. Placed on Bipap 100% , pt restless does not want to keep it on with repeated explanations.  Forgetful to place and time,  MAP 60 - 65.  Hi BNP and procal noted and called to Dr. Munoz.  Dr. Munoz consulted with ICU, NG output 1200cc foul brown, discussed condition with dtr and son and SO.  Afebrile.  Productive, congested loose cough with lots of yellow sputum.  Moderate urine output.   LR bolus 500 ml now, seraquil given for agitation.

## 2020-03-03 NOTE — H&P
"[facility]  History and Physical  Hospitalist       Date of Admission:  3/2/2020    Assessment & Plan   Bhavesh Schafer is a 88 year old male with hypertension, dyslipidemia, BPH, surgical history of segmental resection for incarcerated bowel in left inguinal hernia in 2004 in [facility] who was admitted on 3/2/2020 with lactate >7, hypotension, hypoxia, leukopenia consisted with severe sepsis and possible aspiration pneumonia with small bowel obstruction on CT imaging.     Severe Sepsis  Suspected aspiration pneumonia  Possible viral respiratory infection  Encephalopathy due to sepsis syndrome  Precipitous decline over the past 24 hours prior to admission with symptoms of vomiting, weakness cough with thick sputum. Influenza A/B PCR negative. Meets criteria for severe sepsis with hypotension (78/59), leukopenia (WBC 2.7), hypoxia (O2 sat 85% on room air), HR>90. Lactate still elevated at 3.2 after 2 L fluid given. Ideally would like to rule out COVID-19 but test unavailable at this time. CT chest showed \"patchy ground glass opacities throughout both lungs\" so no definite area of focal aspiration.   -add respiratory virus panel and use droplet precautions for now  -recheck lactate ~ 2 AM and CBC in AM  -Chest xray in AM  -ID consultation  -follow up urine and blood cultures  -Continue aggressive IV fluid rescusitation (in addition to 2 L in ED), continue NS at 125 ml/hour with boluses as needed. Changed IVF to D5NS at ~ 3:30 AM due to hypoglycemia  -high flow supplemental oxygen as needed, wean as tolerated  -scheduled duonebs QID  -prn albuterol nebs    Addendum: 4:20 AM Increasing oxygen needs. Has gotten significant IV fluid resuscitation since arrival in the emergency department.  He has scant urine output.  Bilateral crackles in lungs present.  Though highly suspect this is infectious in nature and a worsening pulmonary process will trial single dose IV Lasix 20 mg to see if this makes any " difference in his work of breathing or oxygen needs.  Remains DNR/DNI status.  We will have respiratory therapy assess for possible BiPAP use.    Addendum 6:30 AM  Minimal urine output (320 ml) after lasix with no reduction in oxygen requirement. Has ongoing hypoglycemia. Will resume gentle IV fluid: D5NS at 50 ml/hour.      Small Bowel Obstruction  No abdominal pain on exam but CT imaging shows mid small bowel transition point near post operative changes in left lower quadrant anteriorly. Surgical history of segmental resection for incarcerated bowel in left inguinal hernia in 2004 in [facility]. Not known when last colonoscopy occurred.   -NPO except ice chips  -surgery consultation  -may need to place NG tube if vomiting recurs    Acute Renal Failure  BPH  Baseline creatinine 0.97 and is 3.3 on presentation. Suspect prerenal etiology--acute tubular necrosis--due to severe dehydration but cannot rule out intrinsic infectious process. Urinalysis with small leukocyte esterase, negative nitrite, 17 WBC.  Low urine output and difficulty passing urine.   -check FENa (though not entirely accurate after several L IV fluids)  -continue zosyn as above  -follow up urine culture, sensitivities  -continue IV fluids  -follow up CMP in AM  -continue flomax when dose verified  -place fitch catheter     Hypertension  Hold lisinopril-hydrochlorothiazide for now    Hypoglycemia  Glucose 59 noted. Patient does not have known diabetes. Suspect this is related to sepsis.  -hypoglycemia protocol  -changed IVF to D5NS    DVT prophylaxis: Pneumatic Compression Devices  Code Status: Prior DNR/DNI per prior code status documentation and discussion with daughter via phone. She thinks patient would be ok with trial of pressors if needed.     Disposition: Expected discharge in >3 days. Currently needing ICU care for sepsis. Lives independently in apartment.     Kayleen Boykin MD  Text  Page    ~~~~~~~~~~~~~~~~~~~~~~~~~~~~~~~~~~~~~~~~~~~~~~~~~~~~~  Primary Care Physician   Eugene Maki    Chief Complaint   Cough, vomiting, weakness    History is obtained from the patient and medical records.    History of Present Illness   Bhavesh Schafer is a 88 [facility] old male [facility] with hypertension, dyslipidemia, BPH, history of bilateral linguinal hernia repair who was brought in by ambulance for extreme weakness, vomiting and cough that started ~ 24 hours prior to admission. Patient is not an optimal historian given current sepsis but some background is provided by a friend who lives in his building. The patient lives independently in an apartment and so it was unusual that he was too weak to get up and move normally. He had uncontrolled vomiting and then developed cough. At his apartment EMS measured BP 70s systolic and O2 sat 85% on room air. No fever documented. Of note, has surgical history of segmental resection for incarcerated bowel in left inguinal hernia in 2004 in [facility]. He denies any abdominal pain. Endorses thick sputum, off-white in color, no blood. Denies hematemesis, melena, BRBPR. Denies recent travel or ill contacts. Cannot provide a good history of the days preceding this admission about exact timeline of symptoms but reports he usually uses a walker to ambulate but earlier today did not even have the strength to get up off of his chair.      Case reviewed with Dr. Gallo from the Emergency Department. Labs and imaging reviewed. Notably he had lactate 7.2, creatinine 3.3, WBC 2.7. Patient received 2 L IV fluids, zosyn. CT imaging shows mid small bowel transition point near post operative changes in left lower quadrant anteriorly. Also showed bilateral ground glass opacities in lungs but no lobar infiltrate.      Past Medical History    I have reviewed this patient's medical history and updated it with pertinent information if needed.   Past Medical History:    Diagnosis Date     Essential hypertension, benign      Inguinal hernia without mention of obstruction or gangrene, unilateral or unspecified, (not specified as recurrent) 6/2013    RIght     Macular degeneration      Macular degeneration (senile) of retina, unspecified     bilat     Pure hypercholesterolemia      Retinal neovascularization NOS     L     Past Surgical History   I have reviewed this patient's surgical history and updated it with pertinent information if needed.  Past Surgical History:   Procedure Laterality Date     HC REMOVE TONSILS/ADENOIDS,<13 Y/O  age 8     HC REPAIR ING HERNIA 5+ YO,BALTAZAR  8/04    L, recurrent with small bowel incarceration     HC REPAIR ING HERNIA,5+Y/O,REDUCIBL      L     HC TOOTH EXTRACTION W/FORCEP       LAPAROSCOPIC HERNIORRHAPHY INGUINAL  7/12/2013    Procedure: LAPAROSCOPIC HERNIORRHAPHY INGUINAL;  Laparoscopic Right Inguinal Hernia Repair with Mesh ;  Surgeon: Martin Quinteros MD;  [facility]     LAPAROSCOPIC HERNIORRHAPHY INGUINAL BILATERAL Left 10/11/2019    Procedure: LAPAROSCOPIC LEFT INGUINAL HERNIA REPAIR WITH MESH POSSIBLE OPEN;  Surgeon: Tariq Garcia MD;  Location:  OR     LASER SURGERY OF EYE  8/04    L retina laser therapy for neovascular membrane     Prior to Admission Medications   Prior to Admission Medications   Prescriptions Last Dose Informant Patient Reported? Taking?   acetaminophen (TYLENOL) 325 MG tablet   No No   Sig: Take 1-2 tablets (325-650 mg) by mouth every 6 hours as needed for pain   cyclobenzaprine (FLEXERIL) 5 MG tablet   No No   Sig: Take 1 tablet (5 mg) by mouth 2 times daily as needed (muscle pain)   ibuprofen (ADVIL/MOTRIN) 600 MG tablet   No No   Sig: Take 1 tablet (600 mg) by mouth every 8 hours as needed for moderate pain   lisinopril-hydrochlorothiazide (PRINZIDE/ZESTORETIC) 20-25 MG tablet   No No   Sig: Take 1 tablet by mouth daily   order for DME   No No   Sig: Equipment being ordered: Walker Wheels  () and Walker ()  Treatment Diagnosis: difficulty walking   order for DME   No No   Sig: Equipment being ordered: Walker Wheels () and Walker ()  Treatment Diagnosis: difficulty walking   oxyCODONE (ROXICODONE) 5 MG tablet   No No   Sig: Take 1 tablet (5 mg) by mouth 2 times daily as needed for moderate to severe pain   polyethylene glycol (MIRALAX/GLYCOLAX) packet   Yes No   Sig: Take 1 packet by mouth daily   psyllium (METAMUCIL/KONSYL) Packet   Yes No   Sig: Take 1 packet by mouth daily   tamsulosin (FLOMAX) 0.4 MG capsule   No No   Sig: Take 1 capsule (0.4 mg) by mouth daily      Facility-Administered Medications: None     Allergies   Allergies   Allergen Reactions     Atenolol Swelling     Fast pulse. Patient does not remember getting this medication.       Social History   I have reviewed this patient's social history and updated it with pertinent information if needed. Bhavesh Schafer  reports that he quit smoking about 41 years ago. He smoked 0.00 packs per day. He has never used smokeless tobacco. He reports that he does not drink alcohol or use drugs.    Family History   I have reviewed this patient's family history and updated it with pertinent information if needed.   Family History   Problem Relation Age of Onset     Cancer Mother         colon     Cerebrovascular Disease Father         ruptured anuerysm     Hypertension Father        Review of Systems   The 10 point Review of Systems is negative other than noted in the HPI or here.     Physical Exam   Temp: 97.8  F (36.6  C) Temp src: Rectal BP: 94/74 Pulse: 91 Heart Rate: 87 Resp: 29 SpO2: (!) 89 % O2 Device: Non-rebreather mask Oxygen Delivery: 8 LPM  Vital Signs with Ranges  Temp:  [97.8  F (36.6  C)] 97.8  F (36.6  C)  Pulse:  [85-91] 91  Heart Rate:  [] 87  Resp:  [20-30] 29  BP: (78-94)/(59-74) 94/74  SpO2:  [89 %-92 %] 89 %  0 lbs 0 oz    Constitutional: Awake, pulling off O2 mask, NAD, pleasant and interactive  Eyes:  PERRL, sclerae anicteric  HEENT: mmm, atraumatic  Respiratory: Bilateral course lower lung sounds and rattle of upper airway  No wheezing  Cardiovascular: RRR, no murmurs  no LE edema  GI: protuberant, soft, non-tender, normal bowel sounds, no rebound or guarding  Skin/Integument: numerous actinic keratoses, warm, dry, no acute rashes  Genitourinary: normal external genitalia, no fitch catheter  Musc: TRIVEDI, normal limb strength x 4  Neuro: oriented to person but not place/date/time in ICU, no focal deficits, no tremors  Psych: not anxious, poor short term memory, tangential     Data   Data reviewed today:  I personally reviewed the CT chest abdomen and pelvis image(s) showing CT imaging shows mid small bowel transition point near post operative changes in left lower quadrant anteriorly. Also showed bilateral ground glass opacities in lungs but no lobar infiltrate. .  Recent Labs   Lab 03/02/20  1857   WBC 2.7*   HGB 12.5*   MCV 94         POTASSIUM 3.8   CHLORIDE 99   CO2 22   BUN 61*   CR 2.87*   ANIONGAP 14   GRAY 9.5   GLC 94   ALBUMIN 3.4   PROTTOTAL 7.1   BILITOTAL 0.4   ALKPHOS 48   ALT 43   AST 64*   LIPASE 157   TROPI <0.015       Imaging:  Recent Results (from the past 24 hour(s))   CT Chest Abdomen Pelvis w/o Contrast    Narrative    CT CHEST ABDOMEN PELVIS WITHOUT CONTRAST   3/2/2020 8:17 PM     HISTORY: Vomiting. Hypoxia.    TECHNIQUE: Volumetric helical sections were acquired from the thoracic  inlet to the ischial tuberosities without IV contrast. Coronal images  were also reconstructed. Radiation dose for this scan was reduced  using automated exposure control, adjustment of the mA and/or kV  according to patient size, or iterative reconstruction technique.    COMPARISON: CT of the abdomen and pelvis performed 6/20/2019.    FINDINGS:    Chest: Patchy groundglass and airspace opacities throughout both  lungs, greater on the right, are nonspecific, and could be related to  edema or infection.  No pleural or pericardial effusions. No enlarged  lymph nodes are identified in the chest. Atherosclerotic calcification  of the thoracic aorta and coronary arteries. Aneurysmal dilatation of  the ascending thoracic aorta measures 4.8 cm in diameter. The thoracic  aorta is tortuous.    Abdomen and Pelvis: Multiple hepatic cysts are again noted, with the  largest in the caudate lobe measuring 4.1 cm. Gallbladder is not seen,  and may be contracted or surgically absent. The spleen, adrenal  glands, and pancreas have unremarkable noncontrast appearances. Small  cysts are noted in the upper poles of both kidneys, with the largest  on the left measuring 4 cm. No hydronephrosis. Moderate  atherosclerotic aortoiliac calcification.    There are multiple prominently dilated fluid-filled loops of mid small  bowel in the lower abdomen anteriorly, with a probable transition  point near postoperative changes in the left lower quadrant (series 7  image 34). Findings are consistent with a mid small-bowel obstruction.  Several decompressed loops of distal small bowel are noted in the  right lower quadrant. The stomach is moderately distended with gas and  fluid. No convincing evidence for colitis or diverticulitis. The colon  is largely decompressed. No free fluid in the pelvis. No free  intraperitoneal air.    Moderate to marked compression of the T12 vertebral body has increased  since the previous exam, and is age-indeterminate. Multiple milder  vertebral body compressions are also noted in the thoracolumbar spine,  not significantly changed where visualized previously. Degenerative  changes are noted throughout the thoracolumbar spine.      Impression    IMPRESSION:   1. There is a mid small-bowel obstruction, with a transition point  near postoperative changes in the left lower quadrant anteriorly.  2. Patchy groundglass and airspace opacities throughout both lungs are  nonspecific, and could be related to edema or  infection.  3. Aneurysmal dilatation of the ascending thoracic aorta measures 4.8  cm in diameter.  4. Moderate to marked compression of the T12 vertebral body has  increased since the previous exam, and is age-indeterminate.          JORDIN DRAKE MD

## 2020-03-03 NOTE — CONSULTS
[facility]    Infectious Disease Consultation     Date of Admission:  3/2/2020  Date of Consult (When I saw the patient): 03/03/20    Assessment & Plan   Bhavesh Schafer is a 88 year old male who was admitted on 3/2/2020.     Impression:  1. 88 y.o male admitted with reports of vomiting, dehydrated, in DUL[place].   2. Imaging with small bowel obstruction with transition point near a previous small bowel resection.    3. Also possible aspiration pneumonitis.     Recommendations:   Zosyn as already in place.   Follow-up on pending micro panel.     Lenore Meyer MD    Reason for Consult   Reason for consult: I was asked to evaluate this patient for elevated procalcitonin.    Primary Care Physician   Eugene Maki    Chief Complaint   Generalized weakness     History is obtained from the patient and medical records    History of Present Illness   Bhavesh Schafer is a 88 year old male was admitted by EMS for evaluation of weakness.  He apparently started vomiting yesterday. Today he felt more weak so he gradually lowered himself to the floor of his kitchen and called for help. No fevers no chills, no resp complaints, but hypoxic in the hospital in [place].     Past Medical History   I have reviewed this patient's medical history and updated it with pertinent information if needed.   Past Medical History:   Diagnosis Date     Essential hypertension, benign      Inguinal hernia without mention of obstruction or gangrene, unilateral or unspecified, (not specified as recurrent) 6/2013    RIght     Macular degeneration      Macular degeneration (senile) of retina, unspecified     bilat     Pure hypercholesterolemia      Retinal neovascularization NOS     L       Past Surgical History   I have reviewed this patient's surgical history and updated it with pertinent information if needed.  Past Surgical History:   Procedure Laterality Date     HC REMOVE TONSILS/ADENOIDS,<11 Y/O  age 8     HC REPAIR ING HERNIA 5+  YO,BALTAZAR  8/04    L, recurrent with small bowel incarceration     HC REPAIR ING HERNIA,5+Y/O,REDUCIBL      L     HC TOOTH EXTRACTION W/FORCEP       LAPAROSCOPIC HERNIORRHAPHY INGUINAL  7/12/2013    Procedure: LAPAROSCOPIC HERNIORRHAPHY INGUINAL;  Laparoscopic Right Inguinal Hernia Repair with Mesh ;  Surgeon: Martin Quinteros MD;  [facility]     LAPAROSCOPIC HERNIORRHAPHY INGUINAL BILATERAL Left 10/11/2019    Procedure: LAPAROSCOPIC LEFT INGUINAL HERNIA REPAIR WITH MESH POSSIBLE OPEN;  Surgeon: Tariq Garcia MD;  Location:  OR     LASER SURGERY OF EYE  8/04    L retina laser therapy for neovascular membrane       Prior to Admission Medications   Prior to Admission Medications   Prescriptions Last Dose Informant Patient Reported? Taking?   acetaminophen (TYLENOL) 325 MG tablet   No No   Sig: Take 1-2 tablets (325-650 mg) by mouth every 6 hours as needed for pain   cyclobenzaprine (FLEXERIL) 5 MG tablet   No No   Sig: Take 1 tablet (5 mg) by mouth 2 times daily as needed (muscle pain)   ibuprofen (ADVIL/MOTRIN) 600 MG tablet   No No   Sig: Take 1 tablet (600 mg) by mouth every 8 hours as needed for moderate pain   lisinopril-hydrochlorothiazide (PRINZIDE/ZESTORETIC) 20-25 MG tablet   No No   Sig: Take 1 tablet by mouth daily   order for DME   No No   Sig: Equipment being ordered: Walker Wheels () and Walker ()  Treatment Diagnosis: difficulty walking   order for DME   No No   Sig: Equipment being ordered: Walker Wheels () and Walker ()  Treatment Diagnosis: difficulty walking   oxyCODONE (ROXICODONE) 5 MG tablet   No No   Sig: Take 1 tablet (5 mg) by mouth 2 times daily as needed for moderate to severe pain   polyethylene glycol (MIRALAX/GLYCOLAX) packet   Yes No   Sig: Take 1 packet by mouth daily   psyllium (METAMUCIL/KONSYL) Packet   Yes No   Sig: Take 1 packet by mouth daily   tamsulosin (FLOMAX) 0.4 MG capsule   No No   Sig: Take 1 capsule (0.4 mg) by mouth daily       Facility-Administered Medications: None     Allergies   Allergies   Allergen Reactions     Atenolol Swelling     Fast pulse. Patient does not remember getting this medication.       Immunization History   Immunization History   Administered Date(s) Administered     Influenza (High Dose) 3 valent vaccine 09/26/2019     Influenza (IIV3) PF 02/07/2011, 10/25/2011     Pneumo Conj 13-V (2010&after) 04/07/2015     Pneumococcal 23 valent 03/19/2004     TD (ADULT, 7+) 12/17/2009     Zoster vaccine, live 12/17/2009       Social History   I have reviewed this patient's social history and updated it with pertinent information if needed. Bhavesh Schafer  reports that he quit smoking about 41 years ago. He smoked 0.00 packs per day. He has never used smokeless tobacco. He reports that he does not drink alcohol or use drugs.    Family History   I have reviewed this patient's family history and updated it with pertinent information if needed.   Family History   Problem Relation Age of Onset     Cancer Mother         colon     Cerebrovascular Disease Father         ruptured anuerysm     Hypertension Father        Review of Systems   The 10 point Review of Systems is negative other than noted in the HPI or here.     Physical Exam   Temp: 98.7  F (37.1  C) Temp src: Axillary BP: 96/61 Pulse: 106 Heart Rate: 101 Resp: (!) 42 SpO2: 97 % O2 Device: High Flow Nasal Cannula (HFNC)(45 L) Oxygen Delivery: 15 LPM  Vital Signs with Ranges  Temp:  [97.8  F (36.6  C)-98.7  F (37.1  C)] 98.7  F (37.1  C)  Pulse:  [] 106  Heart Rate:  [] 101  Resp:  [16-55] 42  BP: ()/(53-88) 96/61  FiO2 (%):  [100 %] 100 %  SpO2:  [86 %-100 %] 97 %  131 lbs 9.83 oz  Body mass index is 20.61 kg/m .    GENERAL APPEARANCE:  Very frail elderly male   EYES: Eyes grossly normal to inspection, PERRL and conjunctivae and sclerae normal  HENT: ear canals and TM's normal and nose and mouth without ulcers or lesions  NECK: no adenopathy, no asymmetry,  masses, or scars and thyroid normal to palpation  RESP: bilateral diminished breath sounds   CV: regular rates and rhythm, normal S1 S2, no S3 or S4 and no murmur, click or rub  LYMPHATICS: normal ant/post cervical and supraclavicular nodes  ABDOMEN: appears to be distended   MS: extremities normal- no gross deformities noted  SKIN: no suspicious lesions or rashes      Data   Lab Results   Component Value Date    WBC 4.2 03/03/2020    HGB 13.1 (L) 03/03/2020    HCT 39.0 (L) 03/03/2020     03/03/2020     03/03/2020    POTASSIUM 4.4 03/03/2020    CHLORIDE 103 03/03/2020    CO2 20 03/03/2020    BUN 64 (H) 03/03/2020    CR 2.52 (H) 03/03/2020    GLC 71 03/03/2020    TROPI <0.015 03/02/2020     (H) 03/03/2020    ALT 54 03/03/2020    ALKPHOS 36 (L) 03/03/2020    BILITOTAL 0.7 03/03/2020     Recent Labs   Lab 03/02/20  1925 03/02/20  1857   CULT No growth after 8 hours No growth after 8 hours     Recent Labs   Lab Test 03/02/20  1925 03/02/20  1857   CULT No growth after 8 hours No growth after 8 hours       Amount of time performed on this consult: 45 minutes. This includes face to face assessment and care coordination with the primary team.

## 2020-03-04 NOTE — PROGRESS NOTES
Intensivist consult:    S:  88M DNR/DNI h/o HLD, HTN now admitted 3/2 for an sbo and significant nausea/vomiting.  Was in respiratory failure requiring bipap with b/l infiltrates in lower lobes on ct chest.  These were interpreted as pulmonary edema and the patient was diuresed resulting in hypotension.  He is now normotensive again following gentle volume administration.  He is fairly confused on my visit and this limits history taking.  Overall, though, he seems to deny pain and dyspnea.  He now has an ng tube and his nausea/vomiting has improved with this.  He was transitioned from bipap to high flow overnight which he is tolerating well.    PE:  BP (!) 149/77   Pulse 100   Temp 97.7  F (36.5  C)   Resp (!) 33   Wt 64.2 kg (141 lb 8.6 oz)   SpO2 91%   BMI 22.17 kg/m    Gen:  No acute distress // HEENT:  PERRL, nose/ears grossly normal // Neck:  Supple // Lymph : no cervical adenopathy // chest : CTA-B, unlabored on bipap // cor:  rrr no m/r/g // abd s/nt/nd // extr:  wwp x4, no edema // neuro:  Awake, alert, able to communicate but responses not always appropriate to situation/question, good str/sens x4 // skin:  No obvious rash    Data (personally reviewed):  Lytes ok; creat 2.38 downtrending;  Lactate 1.8 resolved;   hgb 11.3 ok; wbc 8.1; pltlts 133 ok.    CXR (personally reviewed/interpreted):  B/l lower lobe infiltrates.    EKG (personally reviewed/interpreted):  99 sinus, nl axis, Pr 212, other intervals ok, no acute qrs-st-t.    A/P:  1.  Hypotension:  Now resolved with gentle fluid resuscitation.      2.  Acute hypoxemic respiratory failure:  In setting of probable aspiration pna.  Continue bipap vs high flow; wean as tolerated.  On zosyn.  Sputum cx if produces. Appreciate ID input.    3.  SBO:  Appreciate surgery support.  1200 ml drained from NG tube.  NPO for now.  Fluids and abx as noted.  Appreciate surgery support.    4.  DULCE:  Creat elevated but improving.  Good UOP.      5.  Goals of care:   Discussions with family suggest that comfort is an important goal for him, and they have indicated that full aggressive cares may not be congruent with his wishes.  Palliative care consult today to help navigate this.    Intensivist service will follow with you.

## 2020-03-04 NOTE — PROGRESS NOTES
"[facility]    Hospitalist Progress Note    Interval History   - Sepsis shock improved, remains critically ill, ongoing SBO prevents PO intake. Having difficulty clearing secretions today  - Slightly more confused today  - Discussed with daughter Jeanne and son Pranay, continue current cares, they are well aware that if he deteriorates, a comfort cares approach may be preferred here    Assessment & Plan   Summary: Bhavesh Schafer is a 88 year old male with PMH of HTN, dyslipidemia, BPH, hx resection for incarcerated bowel in left inguinal hernia in 2004, who was admitted on 3/2/2020 with severe shock, small bowel obstruction, and aspiration pneumonia.    Septic shock, improved  Aspiration pneumonia  Precipitous decline for 24 hours prior to admission with symptoms of vomiting, weakness cough with thick sputum. Influenza A/B PCR negative. Was in septic shock initially with hypotension, leukopenia, hypoxic respiratory failure, elevated lactate 7.2. CT chest showed SBO as well as \"patchy ground glass opacities throughout both lungs\" so no definite area of focal aspiration. Per ICU and ID, low clinical suspicion for COVID-19 status of living alone and presenting with a bowel obstruction. Most likely source of infection is aspiartion. Lactate improved to 2.7 on 3/3/2020 and 1.8 on 3/4. Having difficulty clearing secretions on 3/4/2020.  - Respiratory therapy  - Droplet precautions pending respiratory viral panel  - Appreciate ID consult   - Continue Zosyn  - D5 1/2NS @ 125ml/hr  - Wean O2 as able, currently on hi-flow nasal cannula  - Scheduled duonebs QID + albuterol nebs PRN    Small bowel obstruction  Presented with nausea and vomiting, CT imaging shows mid small bowel transition point near post operative changes in left lower quadrant anteriorly. Surgical history of segmental resection for incarcerated bowel in left inguinal hernia in 2004 in [facility] system. Not known when last colonoscopy occurred. " Continues to have a NG tube on 3/4/2020.  - Continue NPO except ice chips  - Appreciate surgery consult   - NG tube  - Consider PPN/TPN in 1-2 days if remains unable to tolerate PO    Acute metabolic encephalopathy  Secondary to SBO and aspiration pneumonia above  - Zyprexa IM for agitation    Acute Renal Failure  BPH  Baseline creatinine 0.97, Cr 3.3 on admission. Likely prerenal versus ATN in setting of hypovolemia, sepsis. Improved to 2.38 on 3/4/2020.  - See above for IVF  - Continue fitch for strict I&O's, consider removing in 1-2 days    Hypertension: Hold lisinopril-hydrochlorothiazide    Hypoglycemia: No known diabetes. Hypoglycemia protocol    DVT Prophylaxis: Pneumatic Compression Devices  Code Status: DNR/DNI  PT/OT: not now    Disposition: Expected discharge 3+ days. Okay to transfer out of ICU if short of beds    Guillermo Munoz MD  Text Page  (7am to 6pm)  -Data reviewed today: I reviewed all new labs and imaging results over the last 24 hours.    Physical Exam   Temp: 97.7  F (36.5  C) Temp src: Oral BP: 134/77 Pulse: 109 Heart Rate: 110 Resp: (!) 32 SpO2: (!) 89 % O2 Device: High Flow Nasal Cannula (HFNC) Oxygen Delivery: Other (Comments)(50LPM)  Vitals:    03/03/20 0000 03/04/20 0600   Weight: 59.7 kg (131 lb 9.8 oz) 64.2 kg (141 lb 8.6 oz)     Vital Signs with Ranges  Temp:  [97  F (36.1  C)-99  F (37.2  C)] 97.7  F (36.5  C)  Pulse:  [] 109  Heart Rate:  [] 110  Resp:  [23-47] 32  BP: ()/() 134/77  FiO2 (%):  [50 %-100 %] 100 %  SpO2:  [87 %-98 %] 89 %  I/O last 3 completed shifts:  In: 4004.59 [I.V.:2504.59; IV Piggyback:1500]  Out: 2795 [Urine:1395; Emesis/NG output:1400]  O2 requirements: yes    Constitutional: Elderly thin male appears ill  HEENT: Eyes nonicteric, oral mucosa dry  Cardiovascular: Tachycardic, normal S1/2, no m/r/g  Respiratory: Ronchorous with crackles in both bases, no wheezing  Vascular: No LE pitting edema  GI: Decreased bowel sounds,  nontender  Skin/Integumen: No rashes  Neuro/Psych: Mildly agitated, A&O to self only    Medications     dextrose 5% and 0.45% NaCl 100 mL/hr at 03/04/20 0407     lactated ringers 10 mL/hr at 03/03/20 2115     - MEDICATION INSTRUCTIONS -       - MEDICATION INSTRUCTIONS -         bisacodyl  10 mg Rectal BID     ipratropium - albuterol 0.5 mg/2.5 mg/3 mL  3 mL Nebulization 4x daily     lactated ringers  500 mL Intravenous Once     piperacillin-tazobactam  2.25 g Intravenous Q6H     QUEtiapine  25 mg Oral BID       Data   Recent Labs   Lab 03/04/20  0506 03/03/20  0537 03/02/20  1857   WBC 8.1 4.2 2.7*   HGB 11.3* 13.1* 12.5*   MCV 90 93 94   * 187 194    133 135   POTASSIUM 3.9 4.4 3.8   CHLORIDE 107 103 99   CO2 23 20 22   BUN 73* 64* 61*   CR 2.38* 2.52* 2.87*   ANIONGAP 6 10 14   GRAY 7.7* 8.5 9.5   GLC 92 71 94   ALBUMIN  --  3.0* 3.4   PROTTOTAL  --  6.7* 7.1   BILITOTAL  --  0.7 0.4   ALKPHOS  --  36* 48   ALT  --  54 43   AST  --  141* 64*   LIPASE  --   --  157   TROPI  --   --  <0.015       Imaging:   Recent Results (from the past 24 hour(s))   XR Chest Port 1 View    Narrative    CHEST ONE VIEW  3/4/2020 7:30 AM     HISTORY: Increase oxygen demand.    COMPARISON: March 3, 2020      Impression    IMPRESSION: Increased patchy infiltrates in the mid right lung  compared to previous. Increased left lower lobe atelectasis and/or  infiltrate. Similar right lower lobe atelectasis and/or infiltrate.  Orogastric tube tip below the margin of the study.    NEETU TABARES MD

## 2020-03-04 NOTE — PROGRESS NOTES
General Surgery Note    We are following for SBO.  Reports of small stool or smear last night.  Otherwise, little bowel function.  Pt denies pain in abdomen.    NAD, pleasant, hard to understand  Sitting up in chair  Sons present and updated on abdomen only, but will need further updates from Intensivist  Abd: mildly distended, Nontender, No BS  NGT: dark yellow/light bilious; 1400 out yesterday, but may be slowing some as 150mL out last shift    Continue to treat medically  No indication for surgery at this time  Keep NGT until full return of bowel function.

## 2020-03-04 NOTE — PROGRESS NOTES
Pt. 100 ml of urine output last two hours.  Lungs coarse. Bipap at 50 percent. Pt. Oriented to self. Ng put out 400 of bile and bowel sounds absents.Skin warm/dry.  Dr. Martinez updated and ok with a mean of 60.

## 2020-03-04 NOTE — PHARMACY-ADMISSION MEDICATION HISTORY
Pharmacy Medication History  Admission medication history interview status for the 3/2/2020  admission is complete. See EPIC admission navigator for prior to admission medications     Medication history sources: Family friend  Medication history source reliability: Good  Adherence assessment: Good    Significant changes made to the medication list:  Removed: Flexeril, Ibuprofen, Oxycodone      Additional medication history information:   The only med he for sure takes is Lisinorpril/HCTZ. Flomax is maybe and not sure about the miralax and psyllium.    Medication reconciliation completed by provider prior to medication history? No    Time spent in this activity: 15 min      Prior to Admission medications    Medication Sig Last Dose Taking? Auth Provider   lisinopril-hydrochlorothiazide (PRINZIDE/ZESTORETIC) 20-25 MG tablet Take 1 tablet by mouth daily  Yes Eugene Maki MD   tamsulosin (FLOMAX) 0.4 MG capsule Take 1 capsule (0.4 mg) by mouth daily  Yes Blayne Velasquez MD   order for DME Equipment being ordered: Walker Wheels () and Walker ()  Treatment Diagnosis: difficulty walking   Harjinder Godinez MD   order for DME Equipment being ordered: Walker Wheels () and Walker ()  Treatment Diagnosis: difficulty walking   Harjinder Godinez MD   polyethylene glycol (MIRALAX/GLYCOLAX) packet Take 1 packet by mouth daily   Unknown, Entered By History   psyllium (METAMUCIL/KONSYL) Packet Take 1 packet by mouth daily   Unknown, Entered By History

## 2020-03-04 NOTE — PROGRESS NOTES
"Patient awake and talking. [place] to self and time. Forgetful that he is in the hospital. Confused as to why he has to stay in the hospital. Anxious about going home and how he will get home and things that need to be done a home. States \"Please take me home\" and \" Who is going to take me home\" frequently and needs reminders that he is in the hospital. Distraction from TV helps for awhile but eventually doesn't distract patient anymore. Very restless and agitated at this time. Not puling at lines.  PERRLA, moves all extremities. Patient HR SR to ST, no ectopy. Patient on HFNC, 50L, 100%, O2 sats in the mid 90s. Patient continues to talk and drop sats despite education. Family at bedside asked to not stimulate patient she he can rest, understands. Lungs course, cough loose but unable to produce sputum. [place] in place, adequate UO, smear of a BM, absent BS at this time. NG output low. Skin frail but intact. Will continue to monitor.    Betsy Ryder RN  "

## 2020-03-04 NOTE — PROGRESS NOTES
SPIRITUAL HEALTH SERVICES Progress Note  [facility] ICU    Received word from palliative physician that pt would not like to see a .  SH team is available for pt/family support, should needs or desires change.                                                                                                                                                 Nathalia Benavidez M.A.  Staff   Pager [phone]  Phone [phone]

## 2020-03-04 NOTE — PROGRESS NOTES
CPAP/BiPAP Settings  IPAP: 12  EPAP: 6  Rate: 12  FiO2: 0.9  Timed Inspiration (sec): 0.9    CPAP/BiPAP/AVAPS/AVAPS AE Alarms  High Pressure: 20  Low Pressure: 5  Low Pressure Delay: 20  High Rate (breaths/min): 50  Low Rate (breaths/min): 5  Alarm Volume Level: 10  CPAP/BiPAP/AVAPS/AVAPS AE Patient Assessment  Skin Assessment:intact  Barriers Applied: applied gel pad and mepilex  Lung Sounds:coarse throughout.    Plan: Continue to monitor.  3/3/2020  Leesa Hunt, RT

## 2020-03-04 NOTE — PROGRESS NOTES
Patient remains on 100%, 50LPM HFNC.  Lung sounds coarse.  Getting scheduled nebs.  Continue to follow.

## 2020-03-04 NOTE — CONSULTS
[facility] [place]  Palliative Care Consultation Note    Patient: Bhavesh Schafer  Date of Admission:  3/2/2020    Requesting Clinician / Team: Dr. 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 Bhavesh's children are aware of how sick Bhavesh is and understand that he may never return to his previous, pre-hospitalization, baseline.     Palliative care will continue to follow and support patient and family and continue goals of care conversations.         Thank you for the opportunity to participate in the care of this patient and family. Our team: will continue to follow.     During regular M-F work hours -- if you are not sure who specifically to contact -- please contact us [phone]    After regular work hours and on weekends/holidays, you can call our answering service at [phone]. Also, who's on call for us is available in [employer].     Attestation:    Total time on the floor involved in the patient's care: 75 minutes  Total time spent in counseling/care coordination: >50%  Cristal Santiago MD / Palliative Medicine Physician    Assessments:  Bhavesh Schafer is a 88 year old male with a past medical history significant for HTN, dyslipidemia, BPH, hx resection for incarcerated bowel in left inguinal hernia in 2004, who was admitted on 3/2/2020 with severe sepsis, small bowel obstruction, and pneumonia, likely aspiration, as well as ongoing delirium.    Today, the patient was seen for:  Sepsis  Bowel obstruction  Respiratory failure  Goals of care      Prognosis, Goals, & Planning:      Functional Status just prior to hospitalization: Unable to determine      Prognosis, Goals, and/or Advance Care Planning were addressed today: Yes        Summary/Comments: I met with  walter, Sandra and Dr. Munoz and myself. Dr. Munoz explained medical problems that Bhavesh was currently being treated for including sepsis, bowel obstruction, pneumonia and renal failure. We explained that the sepsis seems to be slowly improving as well as his renal function but Bhavesh remains critically ill and we do not know if he will improve or not and that over the next few days we would have a better sense of if Bhavesh was going to continue to improve or not. We explained that if he improves enough to get out of the hospital, he will likely be took ill and deconditioned to ever return home. 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 They know how much he wants to return home and how important that is to him. They know that he would not want resuscitation or intubation. They did say that Bhavesh is terrified to die and was never able to write out an advanced directive because of his fear of death.       Patient's decision making preferences: not assessed          Patient has decision-making capacity today for complex decisions: No            I have concerns about the patient/family's health literacy today: No           Patient has a completed Health Care Directive: No.       Code status: DNR/DNI    Coping, Meaning, & Spirituality:   Mood, coping, and/or meaning in the context of serious illness were addressed today: Yes  Summary/Comments: Bhavesh has an eastern Restoration philosophy, most similar to a Congregational and his kids do not feel that he would want to speak with a . They know that what is most important to Bhavesh is his friends, his art work and deeply inner spirituality.     Social:     Living situation: patient lived alone PTA; he has 3 kids - 2 live in [place] and one outside of [place].     Key family / caregivers: 3 children, none of  them live in the area. Many friends    History of Present Illness:  History gathered today from: family/loved ones, medical chart, medical team members    Bhavesh Schafer is a 88 year old male with a past medical history significant for HTN, dyslipidemia, BPH, hx resection for incarcerated bowel in left inguinal hernia in 2004, who was admitted on 3/2/2020 with severe sepsis, small bowel obstruction, and pneumonia, likely aspiration, as well as ongoing delirium.    HPI - as per H&P as patient / family not able to supply HPI -   Bhavesh was brought in by ambulance for extreme weakness, vomiting and cough that started ~ 24 hours prior to admission. On admission, background was provided by a friend who lives in his building. The patient lives independently in an apartment and so it was unusual that he was too weak to get up and move normally. He had uncontrolled vomiting and then developed cough. At his apartment EMS measured BP 70s systolic and O2 sat 85% on room air. No fever documented.    Key Palliative Symptom Data:  We are not helping to manage these symptoms currently in this patient.    ROS:  Pt not able to answer ROS questions      Past Medical History:  Past Medical History:   Diagnosis Date     Essential hypertension, benign      Inguinal hernia without mention of obstruction or gangrene, unilateral or unspecified, (not specified as recurrent) 6/2013    RIght     Macular degeneration      Macular degeneration (senile) of retina, unspecified     bilat     Pure hypercholesterolemia      Retinal neovascularization NOS     L        Past Surgical History:  Past Surgical History:   Procedure Laterality Date     HC REMOVE TONSILS/ADENOIDS,<11 Y/O  age 8     HC REPAIR ING HERNIA 5+ YO,BALTAZAR  8/04    L, recurrent with small bowel incarceration     HC REPAIR ING HERNIA,5+Y/O,REDUCIBL      L     HC TOOTH EXTRACTION W/FORCEP       LAPAROSCOPIC HERNIORRHAPHY INGUINAL  7/12/2013    Procedure: LAPAROSCOPIC HERNIORRHAPHY  INGUINAL;  Laparoscopic Right Inguinal Hernia Repair with Mesh ;  Surgeon: Martin Quinteros MD;  Location:  OR     LAPAROSCOPIC HERNIORRHAPHY INGUINAL BILATERAL Left 10/11/2019    Procedure: LAPAROSCOPIC LEFT INGUINAL HERNIA REPAIR WITH MESH POSSIBLE OPEN;  Surgeon: Tariq Garcia MD;  Location:  OR     LASER SURGERY OF EYE  8/04    L retina laser therapy for neovascular membrane         Family History:  Family History   Problem Relation Age of Onset     Cancer Mother         colon     Cerebrovascular Disease Father         ruptured anuerysm     Hypertension Father          Allergies:  Allergies   Allergen Reactions     Atenolol Swelling     Fast pulse. Patient does not remember getting this medication.        Medications:  I have reviewed this patient's medication profile and medications from this hospitalization.       Physical Exam:  Vital Signs: Temp: 97.7  F (36.5  C) Temp src: Oral BP: (!) 149/77 Pulse: 100 Heart Rate: 102 Resp: (!) 33 SpO2: 91 % O2 Device: High Flow Nasal Cannula (HFNC) Oxygen Delivery: Other (Comments)(50LPM)  Weight: 141 lbs 8.57 oz  Gen: asleep, did not arouse to voice, appears older than stated age, disheveled, ill, in NAD  HENT: NCAT; +temporal wasting, NGtube in place, mucous membranes dry  CV: RRR  Resp: no increased work of breathing, rhonchi throughout lungs b/l   Abd: firm, minimal bowel sounds  Msk: no gross deformity, + sarcopenia  Skin:  no jaundice  Mental status/Psych: somnolent, did not arouse to voice;    Data reviewed:  Reviewed recent labs and pertinent imaging

## 2020-03-04 NOTE — PROGRESS NOTES
Pt remains on HFNC 50L 100% overnight. Episodes of agitation early in shift, ripping off Bipap. All tx given as ordered, will continue to follow.     Harshal Murphy, RT

## 2020-03-04 NOTE — PROGRESS NOTES
Patient increasingly more agitated this morning and having more ectopy, increased BP and boughs of tachycardia. MD aware. Giving Seroquel early, obtaining EKG  and possible start of Precedex to help with agitations. Labs all normal, no replacements needed. Continuing to monitor.    Betsy Ryder RN

## 2020-03-04 NOTE — PROGRESS NOTES
[facility]    Infectious Disease Progress Note    Date of Service (when I saw the patient): 03/04/2020     Assessment & Plan   Bhavesh Schafer is a 88 year old male who was admitted on 3/2/2020.     Impression:  1. 88 y.o male admitted with reports of vomiting, dehydrated, in DULCE.   2. Imaging with small bowel obstruction with transition point near a previous small bowel resection.    3. Also possible aspiration pneumonitis.      Recommendations:   Zosyn as already in place.          Lenore Meyer MD    Interval History   Afebrile   No positive micro   Noted goals of care discussion       Physical Exam   Temp: 98.1  F (36.7  C) Temp src: Oral BP: (!) 149/77 Pulse: 100 Heart Rate: 102 Resp: (!) 33 SpO2: 91 % O2 Device: High Flow Nasal Cannula (HFNC) Oxygen Delivery: Other (Comments)(50LPM)  Vitals:    03/03/20 0000 03/04/20 0600   Weight: 59.7 kg (131 lb 9.8 oz) 64.2 kg (141 lb 8.6 oz)     Vital Signs with Ranges  Temp:  [97  F (36.1  C)-99  F (37.2  C)] 98.1  F (36.7  C)  Pulse:  [] 100  Heart Rate:  [] 102  Resp:  [24-47] 33  BP: ()/(42-77) 149/77  FiO2 (%):  [50 %-100 %] 100 %  SpO2:  [87 %-98 %] 91 %    Constitutional: disoriented   Lungs: Clear to auscultation bilaterally, no crackles or wheezing  Cardiovascular: Regular rate and rhythm, normal S1 and S2, and no murmur noted  Abdomen: distended   Skin: No rashes, no cyanosis, no edema  Other:    Medications     dextrose 5% and 0.45% NaCl 100 mL/hr at 03/04/20 0407     lactated ringers 10 mL/hr at 03/03/20 2115     - MEDICATION INSTRUCTIONS -       - MEDICATION INSTRUCTIONS -         ipratropium - albuterol 0.5 mg/2.5 mg/3 mL  3 mL Nebulization 4x daily     lactated ringers  500 mL Intravenous Once     piperacillin-tazobactam  2.25 g Intravenous Q6H     QUEtiapine  25 mg Oral BID       Data   All microbiology laboratory data reviewed.  Recent Labs   Lab Test 03/04/20  0506 03/03/20  0537 03/02/20  1857   WBC 8.1 4.2 2.7*   HGB  11.3* 13.1* 12.5*   HCT 33.4* 39.0* 38.8*   MCV 90 93 94   * 187 194     Recent Labs   Lab Test 03/04/20  0506 03/03/20  0537 03/02/20  1857   CR 2.38* 2.52* 2.87*     No lab results found.  Recent Labs   Lab Test 03/02/20  1933 03/02/20  1925 03/02/20  1857   CULT No growth No growth after 2 days No growth after 2 days       Attestation:  Total time on the floor involved in the patient's care: 35 minutes. Total time spent in counseling/care coordination: >50%

## 2020-03-05 NOTE — PROGRESS NOTES
"[facility]    Medicine Progress Note - Hospitalist Service       Date of Admission:  3/2/2020  Assessment & Plan   Bhavesh Schafer is a 88 year old male with PMH of HTN, dyslipidemia, BPH, hx resection for incarcerated bowel in left inguinal hernia in 2004, who was admitted on 3/2/2020 with severe shock, small bowel obstruction, and aspiration pneumonia.     Septic versus hypovolemic shock, resolved  Aspiration pneumonia  Precipitous decline for 24 hours prior to admission with symptoms of vomiting, weakness cough with thick sputum. Influenza A/B PCR negative. Was in septic shock initially with hypotension, leukopenia, hypoxic respiratory failure, elevated lactate 7.2. CT chest showed SBO as well as \"patchy ground glass opacities throughout both lungs\" so no definite area of focal aspiration. Per ICU and ID, low clinical suspicion for COVID-19 status of living alone and presenting with a bowel obstruction. Most likely source of infection is aspiartion. Lactate improved to 2.7 on 3/3/2020 and 1.8 on 3/4. Having difficulty clearing secretions on 3/4/2020.  He was on high flow nasal canula this morning.      Continue Zosyn and oxygen     Continue inhaled bronchodilators     Small bowel obstruction    Presented with nausea and vomiting, CT imaging shows mid small bowel transition point near post operative changes in left lower quadrant anteriorly. Surgical history of segmental resection for incarcerated bowel in left inguinal hernia in 2004 in [facility] system. Not known when last colonoscopy occurred. Continue NG tube and NPO    GG study today    Management per surgeon    Atrial fibrillation with rapid ventricular rate    He was loaded with amiodarone and is now in sinus rhythm     Continue intravenous amiodarone      Acute metabolic encephalopathy  Secondary to SBO and aspiration pneumonia above    Zyprexa IM for agitation     Acute kidney injury   Benign prostatic hypertrophy   Creatinine   Date Value " Ref Range Status   03/05/2020 1.93 (H) 0.66 - 1.25 mg/dL Final   Baseline creatinine 0.97, Cr 3.3 on admission. Likely prerenal versus ATN in setting of hypovolemia, sepsis. Improved to 2.38 on 3/4/2020.    Continue intravenous fluid     Continue fitch for strict I&O's, consider removing in 1-2 days     Hypertension    Hold lisinopril-hydrochlorothiazide     Hypoglycemia  No known diabetes.    Hypoglycemia protocol    Diet: NPO for Medical/Clinical Reasons Except for: Ice Chips    DVT Prophylaxis: Pneumatic Compression Devices  Fitch Catheter: in place, indication: Strict 1-2 Hour I&O  Code Status: DNR/DNI      Disposition Plan   Expected discharge: 4 - 7 days, recommended to transitional care unit once respiratory and GI status stable .  Entered: Donaldo Dao MD 03/05/2020, 3:25 PM       The patient's care was discussed with the Bedside Nurse and Patient.    Donaldo Dao MD  Hospitalist Service  [facility]    ______________________________________________________________________    Interval History   No specific pain or complaints.    Data reviewed today: I reviewed all medications, new labs and imaging results over the last 24 hours. I personally reviewed no images or EKG's today.    Physical Exam   Vital Signs: Temp: 98.4  F (36.9  C) Temp src: Axillary BP: (!) 151/92 Pulse: 105 Heart Rate: 94 Resp: 26 SpO2: 92 % O2 Device: High Flow Nasal Cannula (HFNC) Oxygen Delivery: Other (Comments)(50 l)  Weight: 137 lbs 2.02 oz  Constitutional: awake, alert, cooperative, no apparent distress, and appears stated age  HEENT: + NGT  Respiratory: No increased work of breathing, good air exchange, clear to auscultation bilaterally, no crackles or wheezing  Cardiovascular:  regular rate and rhythm, normal S1 and S2, no S3 or S4, and no murmur noted  GI: No scars, normal bowel sounds, soft, non-distended, non-tender  Genitounirinary:  + Fitch   Skin: no rashes  Neuropsychiatric: General:  normal, calm and normal eye contact    Data   Recent Labs   Lab 03/05/20  0755 03/05/20  0421 03/04/20  2102 03/04/20  0506 03/03/20  0537 03/02/20  1857   WBC  --  9.0  --  8.1 4.2 2.7*   HGB  --  12.3*  --  11.3* 13.1* 12.5*   MCV  --  90  --  90 93 94   PLT  --  119*  --  133* 187 194   NA  --  138 139 136 133 135   POTASSIUM  --  3.3* 3.3* 3.9 4.4 3.8   CHLORIDE  --  108 108 107 103 99   CO2  --  24 25 23 20 22   BUN  --  67* 71* 73* 64* 61*   CR  --  1.93* 2.06* 2.38* 2.52* 2.87*   ANIONGAP  --  6 6 6 10 14   GRAY  --  8.4* 8.3* 7.7* 8.5 9.5   GLC  --  126* 127* 92 71 94   ALBUMIN  --   --   --   --  3.0* 3.4   PROTTOTAL  --   --   --   --  6.7* 7.1   BILITOTAL  --   --   --   --  0.7 0.4   ALKPHOS  --   --   --   --  36* 48   ALT  --   --   --   --  54 43   AST  --   --   --   --  141* 64*   LIPASE  --   --   --   --   --  157   TROPI 0.029 0.032 0.027  --   --  <0.015     Recent Results (from the past 24 hour(s))   XR Abdomen Port 1 View    Narrative    ABDOMEN ONE VIEW PORTABLE  3/5/2020 10:30 AM     HISTORY: Check for progression of PO contrast    COMPARISON: CT dated 3/2/2020. Radiograph dated 3/3/2020.       Impression    IMPRESSION: Oral contrast material is present. It is unclear if this  is an the small bowel or colon. Recommend abdominal radiography in the  radiology department.    LESTER J FAHRNER, MD     Medications     amiodarone 0.5 mg/min (03/05/20 1454)     dextrose 5% and 0.45% NaCl 100 mL/hr at 03/04/20 1710     - MEDICATION INSTRUCTIONS -       - MEDICATION INSTRUCTIONS -         bisacodyl  10 mg Rectal BID     lactated ringers  500 mL Intravenous Once     levalbuterol  0.3 mg Nebulization Q4H     piperacillin-tazobactam  3.375 g Intravenous Q6H     QUEtiapine  25 mg Oral BID

## 2020-03-05 NOTE — PROGRESS NOTES
Intensivist consult:    S:  88M DNR/DNI h/o HLD, HTN now admitted 3/2 for an sbo and significant nausea/vomiting.  Was in respiratory failure requiring bipap with b/l infiltrates in lower lobes on ct chest.  These were interpreted as pulmonary edema and the patient was diuresed resulting in hypotension.  He is now normotensive again following gentle volume administration.  Mentation is more clear this morning.  He answers my questions appropriately.  He denies abd pain, nausea, vomiting, chest pain, shortness of breath and lightheadedness.    PE:  /76   Pulse 85   Temp 99.2  F (37.3  C) (Axillary)   Resp 27   Wt 62.2 kg (137 lb 2 oz)   SpO2 91%   BMI 21.48 kg/m    Gen:  No acute distress // HEENT:  PERRL, nose/ears grossly normal // Neck:  Supple // Lymph : no cervical adenopathy // chest : CTA-B, unlabored on bipap // cor:  rrr no m/r/g // abd s/nt/nd // extr:  wwp x4, no edema // neuro:  Awake, alert, mentation more clear this morning, good str/sens x4 // skin:  No obvious rash    Data (personally reviewed):  Lytes ok; creat 1.93 downtrending;  Lactate 1.6 resolved;   Wbc 9.0 ok; wbc 12.3; pltlts 119 ok.    AXR (personally reviewed/interpreted):  Persistent stool or contrast in distal colon/rectum.  No dilated bowel or air/fluid levels    EKG (personally reviewed/interpreted):  94 sinus, LAFB, Pr 218, other intervals ok, no acute qrs-st-t.     A/P:  1. Acute hypoxemic respiratory failure:  In setting of probable aspiration pna.  Continue bipap vs high flow; wean as tolerated.  On zosyn.  Sputum cx if produces. Appreciate ID input.    2.  SBO:  Appreciate surgery support.  Continue ng drainage.  NPO for now.  Fluids and abx as noted.  Appreciate surgery support.    4.  DULCE:  Creat elevated but improving.  Good UOP.      5.  Goals of care:  Discussions with family suggest that comfort is an important goal for him, and they have indicated that full aggressive cares may not be congruent with his wishes.   Remains DNR/DNI.  Appreciate palliative care inputs.    Intensivist service will follow with you.

## 2020-03-05 NOTE — PROGRESS NOTES
S:  87 y/o M @ HD4 admitted for SBO, aspiration pneumonia, and sepsis.    Overnight, pt went into a-fib with RVR.  Workup nonrevelatory.  EKG without ST elevation.  Troponins elevated, now downtrending.  Now in NSR following amiodarone infusion.    On exam, no complaints.  No pain, NPO, no nausea, no vomiting, urinating, having BMs, and on bedrest.    O:  AFVSS on exam    I/O:  2700/3105/-405  I:  IVF @ 100cc/hr  O:  NGT:  1900 out (50cc since midnight)      F:  1205      BM:  x2    GEN:  WD thin WM NAD  NEURO:  GCS=14, AAO x2 (self and location), RASS = 0  PSYCH:  Tired, occasionally confused on exam.  Normal  TP.  HENT:  NCAT, dry mucus membranes, hoarse voice, wet cough  Eyes:  EOMI  Neck:  Supple  CV:  RRR, no M/R/G  PULM:  CTAB, no W/R/R  GI:  Soft, NTND, (+) normoactive BS X4 quadrants  Skin:  Nonflushed, nondiaphoretic skin that is an appropriate color-tone for the patient's racial-ethnic background    Meds:  See med rec; started amiodarone overnight    Labs:  Reviewed  CBC:  Unremarkable  Renal:  Creatinine remains elevated, hypokalemia,    Lactate:  Downtrending; now normalized  Troponins:  Slight elevation overnight, now downtrending    Rads:  pKUB pending    A/S:  None    Consults:  Hospitalist primary  ICU:  Consulted to assist with airway management    A/P:  87 y/o M @ HD4 admitted for SBO, aspiration pneumonia, and sepsis.    At this time, patient's oxygenation issues are stable.  HFNC maintaining adequate oxygenation.  Pt remains DNR/DNI.  No plan for intubation, central access, vasopressors, or coding the patient.  As such, will plan on transfer out of the unit when hospitalist determines this is appropriate.  Patient needs pKUB per GS to evaluate for contrast migration.  Ordered on behalf of hospitalist.  Patient needs activity orders so that PT/OT/SLP will evaluate patient.  Ordered on behalf of hospitalist.    No other recommendations from ICU service.  -PT consult  -OT consult  -SLP  consult  -pKUB    MAJ Melecio Olson MD, FS  F1, PGY9  N SICU Fellow    S/d/w Dr. Collins who agrees with the plan of care as outlined above

## 2020-03-05 NOTE — PROGRESS NOTES
ICU crosscover note  3/4/2020    's. Afib confirmed on EKG. Patient was confused, but not in noticeable pain or distress. Lungs rhonchorous, no crackles. Strong peripheral pulses.   Ordered troponin level and electrolytes.  Plan to give amiodarone bolus and then infusion. Metoprolol avoided due to documented metoprolol allergy.    Plan:  - Amiodarone 150 mg bolus, then gtt  - F/u troponin and metabolic panel    Alonso Borjas, MS4    Agree with medical student note above.     - trend troponin given mild elevation, but without ST segment changes, most consistent with demand ischemia    In addition: called for hypotension - BSUS showed good biventricular cardiac function and collapsing IVC- hypotension likely related to hypovolemia given tenuous respiratory status, will gently give fluids if needed.     Lactic acid level check ordered, and given patient's known SBO, will re- set NG tube to suction.     Mery Meier MD 3/4/2020 11:13 PM

## 2020-03-05 NOTE — PROGRESS NOTES
Patient remains on 100%, 50LPM HFNC.  Lung sounds coarse.  NT suctioned x1 large amount tan thick secretions. Getting scheduled nebs.  Continue to follow.

## 2020-03-05 NOTE — PROGRESS NOTES
[facility] General Surgery:    The patient was seen and examined today. I agree with the note written today by Dale Kim PA-C, including the findings and plan as written.  Pertinent aspects from my exam or visit today in regards to medical decision making: Patient more somnolent today.  Remains on significant respiratory support.  Abdomen soft, slightly distended, mild tenderness with palpation but no evidence of peritonitis.  NG output decreased over the day yesterday but increased overnight last night.  Patient did have 2 small bowel movements yesterday and one this AM.  Passing flatus.  Abdominal x-ray pending from this morning, status post administration of Gastrografin yesterday afternoon.  No obvious indication for surgical intervention at this time.  Surgery to follow.    Blank Alejandro MD

## 2020-03-05 NOTE — PLAN OF CARE
Converted to sinus rhythm. Solomnent this morning. Periods of confusion and lucidity. Declined to get in the chair. LS course throughout. Loose cough but not able to expectorate. High flow nasal cannula 100% 50 L. Mitts on for occasional pulling lines. Had a small BM this shift. Was not deemed appropriate for PT or a swallow study. NG brown output 1300 ml. Family met with SW. Initiating nutrition yet to be determined. To be transferred when bed is available.

## 2020-03-05 NOTE — PROGRESS NOTES
[facility]    Infectious Disease Progress Note    Date of Service (when I saw the patient): 03/05/2020     Assessment & Plan   Bhavesh Schafer is a 88 year old male who was admitted on 3/2/2020.     Impression:  1. 88 y.o male admitted with reports of vomiting, dehydrated, in DULCE.   2. Imaging with small bowel obstruction with transition point near a previous small bowel resection.    3. Also possible aspiration pneumonitis.      Recommendations:   Zosyn as already in place. 5- 7 days course.   Signing off please call if ques.          Lenore Meyer MD    Interval History   Afebrile   No positive micro   Noted goals of care discussion       Physical Exam   Temp: 99.2  F (37.3  C) Temp src: Axillary BP: 117/81 Pulse: 112 Heart Rate: 101 Resp: (!) 49 SpO2: 91 % O2 Device: High Flow Nasal Cannula (HFNC) Oxygen Delivery: Other (Comments)(50 l)  Vitals:    03/03/20 0000 03/04/20 0600 03/05/20 0400   Weight: 59.7 kg (131 lb 9.8 oz) 64.2 kg (141 lb 8.6 oz) 62.2 kg (137 lb 2 oz)     Vital Signs with Ranges  Temp:  [97.8  F (36.6  C)-99.2  F (37.3  C)] 99.2  F (37.3  C)  Pulse:  [] 112  Heart Rate:  [] 101  Resp:  [15-49] 49  BP: ()/() 117/81  FiO2 (%):  [100 %] 100 %  SpO2:  [88 %-99 %] 91 %    Constitutional: disoriented   Lungs: Clear to auscultation bilaterally, no crackles or wheezing  Cardiovascular: Regular rate and rhythm, normal S1 and S2, and no murmur noted  Abdomen: distended   Skin: No rashes, no cyanosis, no edema  Other:    Medications     amiodarone 0.5 mg/min (03/05/20 0622)     dextrose 5% and 0.45% NaCl 100 mL/hr at 03/04/20 4677     - MEDICATION INSTRUCTIONS -       - MEDICATION INSTRUCTIONS -         bisacodyl  10 mg Rectal BID     lactated ringers  500 mL Intravenous Once     levalbuterol  0.3 mg Nebulization Q4H     piperacillin-tazobactam  3.375 g Intravenous Q6H     potassium chloride  10 mEq Intravenous Q1H     QUEtiapine  25 mg Oral BID       Data   All  microbiology laboratory data reviewed.  Recent Labs   Lab Test 03/05/20  0421 03/04/20  0506 03/03/20  0537   WBC 9.0 8.1 4.2   HGB 12.3* 11.3* 13.1*   HCT 36.3* 33.4* 39.0*   MCV 90 90 93   * 133* 187     Recent Labs   Lab Test 03/05/20  0421 03/04/20  2102 03/04/20  0506   CR 1.93* 2.06* 2.38*     No lab results found.  Recent Labs   Lab Test 03/02/20  1933 03/02/20  1925 03/02/20  1857   CULT No growth No growth after 3 days No growth after 3 days       Attestation:  Total time on the floor involved in the patient's care: 35 minutes. Total time spent in counseling/care coordination: >50%

## 2020-03-05 NOTE — PROGRESS NOTES
Solo's test performed prior to radial ABG draw. Collateral circulation confirmed.  Site:Left radial  Device:HFNC 50L FiO2 100%    3/4/2020  Paige Pereira RT

## 2020-03-05 NOTE — PROGRESS NOTES
General Surgery Note    Stable SBO, resolving  -Continue medical management  -Will check AXR this am after gastroview given yesterday at noon.  He's had 2 small BM's since, but his NG output has gone up some at the same time.    Doing ok.  Denies abdominal pain.    NAD, alert, responds to yes/no questions  /76   Pulse 85   Temp 98.8  F (37.1  C) (Axillary)   Resp 27   Wt 62.2 kg (137 lb 2 oz)   SpO2 91%   BMI 21.48 kg/m      Intake/Output Summary (Last 24 hours) at 3/5/2020 0836  Last data filed at 3/5/2020 0800  Gross per 24 hour   Intake 2810 ml   Output 3045 ml   Net -235 ml     NGT: green-light green output; up to 1400 overnight  Abd: no tenderness to palpation, ND    AXR: pending    WBC: wnl  Hgb: 12.3  Lactic 1.6

## 2020-03-05 NOTE — PLAN OF CARE
Patient continues to be on 50L 100% high flow NC. LS course throughout. Has 2 Small bms this shift. 500 brown/bile output NG . Was put to gravity drainage. X-ray with contrast in the AM. Appeared to become confused at the day wore on. Became more agitated in the afternoon, pulling at equiptment PRN Seroquel administered with results. Sat in his chair for 2 hours. VS labile if agitated. Desaturates and becomes tachycardic. Productive cough with brown sputum.

## 2020-03-05 NOTE — PROGRESS NOTES
CM    I: SW consult for discharge planning/community resources pending. Family not available at this time. ICU bedside RN will call SW when family arrives.    P: Continue to assist as needed.      HECTOR Dickson   [facility]  [phone]

## 2020-03-05 NOTE — PROGRESS NOTES
Pt remains on HFNC 50L 100% t/o the day.  LS are coarse t/o.  Will continue to follow and wean FIO2 as able.  Alvaro Dee, RT  3/5/2020

## 2020-03-05 NOTE — CONSULTS
Care Transition Initial Assessment - SW     Met with: FAMILY   Active Problems:    Severe sepsis (H)       DATA            Description of Support System: Supportive, Involved  Who is your support system?: Significant Other, Children  Support Assessment: Adequate family and caregiver support, Adequate social supports.   Identified issues/concerns regarding health management:  Possible need for increased services vs palliative/comfort cares  ASSESSMENT  Cognitive Status:  Unable to assess  Concerns to be addressed: Discharge planning/community resources.    SW reviewed chart and met with patient's daughter, Johanna, to discuss discharge planning. Pt was admitted to ICU on: 3/2/20. Anticipated discharge date: Unknown at this time. SW introduced self and role. Dtr had many questions as it appears they are still determining goals of care for pt. SW provided information regarding both restorative care and comfort care, depending on how pt progresses or declines. Dtr asked for information in writing, which was given. Dtr also stated she believes pt does have a HCD, which may name his S.O. as agent. Dtr will search for this, stating she and her brother are struggling with decision making for patient, if it comes to this. SW will continue to assist and follow.    PLAN  Financial costs for the patient includes: Possible costs for placement, if pt goes comfort cares. .  Patient given options and choices for discharge: TBD .  Patient/family is agreeable to the plan?  Plan TBD  Transportation/person available to transport on day of discharge  is TBD  Patient Goals and Preferences: TBD .  Patient anticipates discharging to:  TBD    Continue to assist as needed.    HECTOR Dickson   [facility]  [phone]

## 2020-03-05 NOTE — PLAN OF CARE
PT/OT: Orders received, chart reviewed, spoke with RN and patient's daughter. Daughter stating she would like to speak with the RN and MD about goals of care decisions. Not appropriate for therapy evals at this time.

## 2020-03-05 NOTE — PROGRESS NOTES
"Received patient with HR in the 130s, determined to be Afib, Amiodarone started. Labs WNL at this time except for K, which was replaced. Patient Awake and taking but much more somnolent. HFNC in place, titration up on O2, ABG WNL. Tried to suction patient but very aggravated and agitated when we tried. Yelling \"Leave me alone!\" Ultimately did no get any sputum up and patient still coughing and course sounding. Explained procedure to patient as we all benefits of suctioning. MD at bedside to see patient. O2 saturations eventually normalized and patient resting comfortably. Patient more agitated throughout night, no oriented, speaking in [place] at times. CAM ICU (+). Many attempts to reorient patient with no success, Mitts placed on patient to avoid pulling at lines. Successful intervention. Patient rests on and off with boughs of agitation. Afib remains after start of amio drip, CVR. BP maps above 60, no boluses given. UO good. No BM, NG placed back at LIS, 1400 ml out at that time.  Family not at bedside this evening. Continuing to monitor patient.     Betsy Ryder RN  "

## 2020-03-06 NOTE — PROGRESS NOTES
Intensivist consult:    S:  88M DNR/DNI h/o HLD, HTN now admitted 3/2 for an sbo and significant nausea/vomiting.  Was in respiratory failure requiring bipap with b/l infiltrates in lower lobes on ct chest.  These were interpreted as pulmonary edema and the patient was diuresed resulting in hypotension.  He is now normotensive again following gentle volume administration, and has remained so for several days.  Increased oxygenation needs overnight requiring bipap, but he denies shortness of breath to me.  Continues to say his belly feels ok.  Denies abd pain, nausea, vomiting.    PE:  /82   Pulse 67   Temp 97.3  F (36.3  C) (Axillary)   Resp (!) 35   Wt 63.5 kg (139 lb 15.9 oz)   SpO2 97%   BMI 21.93 kg/m    bipap 16/8  Gen:  No acute distress // HEENT:  PERRL, nose/ears grossly normal // Neck:  Supple // Lymph : no cervical adenopathy // chest : CTA-B, unlabored on bipap // cor:  rrr no m/r/g // abd s/nt/nd // extr:  wwp x4, no edema // neuro:  Awake, alert, mentation more clear this morning, good str/sens x4 // skin:  No obvious rash    Data (personally reviewed):  Lytes ok; creat 1.56 downtrending; Wbc 11.9 ok; hgb 12.2 acceptable; pltlts 97 ok.    AXR (personally reviewed/interpreted):  Persistent stool or contrast.  Radiologist unable to say whether large or small bowel.    A/P:  1. Acute hypoxemic respiratory failure, requiring nippv: Worsened overnight; again requiring bipap. In setting of probable aspiration pna.  Continue bipap vs high flow; wean as tolerated.  On zosyn.  Sputum cx if produces. Appreciate ID input.  More appropriate for diuresis today; will initiate gentle lasix.    2.  SBO:  Appreciate surgery support.  Continue ng drainage.  NPO for now.  Fluids and abx as noted. Symptoms suggest improvement.  Will eventually need further abdominal imaging but would like improved respiratory status first.    4.  DULCE:  Creat elevated but improving.  Good UOP.      5.  Goals of care:   Discussions with family suggest that comfort is an important goal for him, and they have indicated that full aggressive cares may not be congruent with his wishes.  Remains DNR/DNI.  Appreciate palliative care inputs.    Critical care time:  35 min    Intensivist service will follow with you.

## 2020-03-06 NOTE — PLAN OF CARE
Pt transferred from ICU on 80% BIPAP. Vitals stable, RR in the mid 30's. Q2H turn and reposition. Fluids running. NG tube connected to gravity. Pt had one soft incontinent BM. NPO. Tele SR/SD w/ frequent PAC's. Blanchable redness around nose bridle, bridge of nose and lower lip. [place] patent. Family at bedside. Family unable to find advanced directive. Found per home care note 11/19/19- that pt never filled out advanced directive with them. Awaiting palliative family conference.

## 2020-03-06 NOTE — PROGRESS NOTES
[facility]  Palliative Care Daily Progress Note       Recommendations & Counseling       Continue all current cares    If patient declines further, family would like to transition to comfort cares and have BiPAP removed. Medications for dyspnea / pain ordered.     If Bhavesh is transitioned to comfort cares, please contact in house hospitalist who will place comfort care order set and assure that patient is premedicated and comfortable before BiPAP is removed. Please call palliative care on-call physician at [phone] for assistance with transition to comfort cares / symptom management.     Recommended starting doses to give patient prior to BiPAP removal - Since this patient is requiring 100% O2 via bipap, I believe he will need the following doses to relieve his severe air hunger and provide comfort:     Hydromorphone 1-2mg IV q15 min PRN - Low threshold to increase dose to 2-4mg IV q15 min and contact on call pallliative care physician.     Lorazepam 1-2mg IV prior to BiPAP removal and if patient remains alert, low threshold to increase dose 2-4mg.        6:15pm - Family decided to transition to comfort cares after patient had increased difficulty breathing and discomfort with bipap. Orders placed.     Thank you for the opportunity to continue to participate in the care of this patient and family.  Please feel free to contact on-call palliative provider with any emergent needs.  We can be reached via team pager [phone] (answered 8-4:30 Monday-Friday); after-hours answering service ([phone]);     Attestation:    Total time on the floor involved in the patient's care: 90 minutes; met with patient and daughter 10:15am - 10:45am; met with daughter and sons 2:45pm - 3:10 and 3:40 - 4:20pm.   Total time spent in counseling/care coordination: >50%  Cristal Santiago MD / Palliative Medicine Physician     Assessments          Bhavesh Schafer is a 88 year old male with a past  medical history significant for HTN, dyslipidemia, BPH, hx resection for incarcerated bowel in left inguinal hernia in 2004, who was admitted on 3/2/2020 with severe sepsis, small bowel obstruction, and pneumonia, likely aspiration, as well as ongoing delirium. Since admission, patient has required 100% high flow O2 and last night his O2 requirements increased and he is now on 100% bipap.      Today, the patient was seen for:  Sepsis  Bowel obstruction  Respiratory failure  Goals of care      Prognosis, Goals, or Advance Care Planning was addressed today with: Yes. I met with Richies children, Jeanne Su and Pranay. Explained current medical issues including ongoing sepsis, bowel obstruction, renal failure and respiratory failure. Talked about how despite the fact that there is improvement in Bhavesh's renal function and bowel obstruction and overall sepsis picture, he is still incredibly sick and his respiratory function worsened overnight requiring placement of BiPAP. Currently Bhavesh is comfortable on BiPAP and not feeling short of breath but I expressed my concern that this was likely to change and if / when this happens, I recommended transition to comfort cares and aggressively managing his respiratory failure with medications to keep him comfortable and prevent suffering. Richies children are all in agreement with this and feel very strongly that Bhavesh would not want to suffer and would want to be comfortable as he is dying. They worry that he is uncomfortable with the BiPAP and would not even want that and we agreed that if he seems uncomfortable moving forward, we would immediately transition to comfort cares at that time.   Mood, coping, and/or meaning in the context of serious illness were addressed today: No.            Interval History:     Chart review/discussion with unit or clinical team members:   patient has required 100% high flow O2 and last night his O2 requirements increased and he is now on  100% bipap. Pt remains comfortable on bipap without complaints.    Per patient or family/caregivers today:  No complaints. Pt denies pain, denies feeling short of breath    Key Palliative Symptoms:  # Pain severity the last 12 hours: none  # Dyspnea severity the last 12 hours: none             Review of Systems:     Besides above, ROS was reviewed and is unremarkable - pt on 100% Bipap and denies feeling SOB or pain.           Medications:     I have reviewed this patient's medication profile and medications during this hospitalization.           Physical Exam:   Vitals were reviewed  Temp: 97.3  F (36.3  C) Temp src: Axillary BP: 133/78 Pulse: 71 Heart Rate: 63 Resp: 28 SpO2: 97 % O2 Device: BiPAP/CPAP Oxygen Delivery: Other (Comments)(60 L)  Gen: wearing face BiPAP, cachetic, appears chronically ill, in NAD  Eyes: Conjunctiva clear. Sclera anicteric .  HENT: NCAT; +temporal wasting  CV: RRR  Resp: no increased work of breathing wearing bipap, course breath sounds throughout  Msk: no gross deformity, + sarcopenia  Skin:  no jaundice  Neuro / neuropsych: alert, nods head appropriately, sensorium not intact             Data Reviewed:     Reviewed recent labs and pertinent imaging

## 2020-03-06 NOTE — PROGRESS NOTES
Patient on BiPap, tolerating well, O2 saturations in the mid 90s. Labored breathing continues. Patient responding to daughter who is at bedside. Limited talking. SR, BP WNL. Good UO. NG to LIS, no BM overnight. Continuing to monitor.     Betsy Ryder RN

## 2020-03-06 NOTE — PROGRESS NOTES
Pt BiPap dependant with FiO2=80%, desats to low 80s on RA. Denies pain. Inconsistently following commands. Minimal output from NG tube this shift. Amioderone gtt discontinued at 0900, Tele: SR. Transfer to [facility] at approx 1145. Family at bedside updated on POC.

## 2020-03-06 NOTE — PROGRESS NOTES
"[facility]    Medicine Progress Note - Hospitalist Service       Date of Admission:  3/2/2020  Assessment & Plan   Bhavesh Schafer is a 88 year old male with PMH of HTN, dyslipidemia, BPH, hx resection for incarcerated bowel in left inguinal hernia in 2004, who was admitted on 3/2/2020 with severe shock, small bowel obstruction, and aspiration pneumonia.     Septic versus hypovolemic shock, resolved  Acute hypoxic respiratory failure  Aspiration pneumonia  Precipitous decline for 24 hours prior to admission with symptoms of vomiting, weakness cough with thick sputum. Influenza A/B PCR negative. Was in septic shock initially with hypotension, leukopenia, hypoxic respiratory failure, elevated lactate 7.2. CT chest showed SBO as well as \"patchy ground glass opacities throughout both lungs\" so no definite area of focal aspiration. Per ICU and ID, low clinical suspicion for COVID-19 status of living alone and presenting with a bowel obstruction. Most likely source of infection is aspiartion. Lactate improved to 2.7 on 3/3/2020 and 1.8 on 3/4. Having difficulty clearing secretions on 3/4/2020.  On March 5 the patient was doing better on high flow nasal cannula but overnight his respiratory status declined and he is on BiPAP today.    Continue Zosyn, oxygen and BiPAP    Continue inhaled bronchodilators     The family is considering comfort care later today    Small bowel obstruction  Presented with nausea and vomiting, CT imaging shows mid small bowel transition point near post operative changes in left lower quadrant anteriorly. Surgical history of segmental resection for incarcerated bowel in left inguinal hernia in 2004 in [facility] system. Not known when last colonoscopy occurred. Continue NG tube and NPO    Continue NG tube and NPO    Management per surgeon    Atrial fibrillation with rapid ventricular rate    He was loaded with amiodarone and is now in sinus rhythm     Continue intravenous amiodarone "      Acute metabolic encephalopathy  Secondary to SBO and aspiration pneumonia above    Zyprexa IM for agitation     Acute kidney injury   Benign prostatic hypertrophy   Creatinine   Date Value Ref Range Status   03/06/2020 1.56 (H) 0.66 - 1.25 mg/dL Final   Baseline creatinine 0.97, Cr 3.3 on admission. Likely prerenal versus ATN in setting of hypovolemia, sepsis. Improved to 2.38 on 3/4/2020.    Continue intravenous fluid     Continue fitch for strict I&O's, consider removing in 1-2 days     Hypertension    Hold lisinopril-hydrochlorothiazide     Hypoglycemia  No known diabetes.    Hypoglycemia protocol    Diet: NPO for Medical/Clinical Reasons Except for: Ice Chips    DVT Prophylaxis: Pneumatic Compression Devices  Fitch Catheter: in place, indication: Strict 1-2 Hour I&O  Code Status: DNR/DNI      Disposition Plan   The family is meeting with palliative care this afternoon and are considering comfort care.  Entered: Donaldo Dao MD 03/06/2020, 11:55 AM       The patient's care was discussed with the Patient, daughter and nurse.    Donaldo Dao MD  Hospitalist Service  [facility]    ______________________________________________________________________    Interval History   No specific pain or complaints.    Data reviewed today: I reviewed all medications, new labs and imaging results over the last 24 hours. I personally reviewed no images or EKG's today.    Physical Exam   Vital Signs: Temp: 97.3  F (36.3  C) Temp src: Axillary BP: (!) 141/73 Pulse: 67 Heart Rate: 68 Resp: (!) 31 SpO2: 98 % O2 Device: BiPAP/CPAP Oxygen Delivery: Other (Comments)(60 L)  Weight: 139 lbs 15.87 oz  Constitutional: awake, alert, appears comfortable on BiPAP  HEENT: + NGT  Respiratory: No increased work of breathing, good air exchange, clear to auscultation bilaterally, no crackles or wheezing  Cardiovascular:  regular rate and rhythm, normal S1 and S2, no S3 or S4, and no murmur noted  GI: No  scars, normal bowel sounds, soft, non-distended, non-tender  Genitounirinary:  + [place]   Skin: no rashes  Neuropsychiatric: General: normal, calm and normal eye contact    Data   Recent Labs   Lab 03/06/20  0543 03/05/20  0755 03/05/20  0421 03/04/20  2102 03/04/20  0506 03/03/20  0537 03/02/20  1857   WBC 11.9*  --  9.0  --  8.1 4.2 2.7*   HGB 12.2*  --  12.3*  --  11.3* 13.1* 12.5*   MCV 92  --  90  --  90 93 94   PLT 97*  --  119*  --  133* 187 194     --  138 139 136 133 135   POTASSIUM 3.4  --  3.3* 3.3* 3.9 4.4 3.8   CHLORIDE 109  --  108 108 107 103 99   CO2 27  --  24 25 23 20 22   BUN 57*  --  67* 71* 73* 64* 61*   CR 1.56*  --  1.93* 2.06* 2.38* 2.52* 2.87*   ANIONGAP 3  --  6 6 6 10 14   GRAY 8.5  --  8.4* 8.3* 7.7* 8.5 9.5   *  --  126* 127* 92 71 94   ALBUMIN  --   --   --   --   --  3.0* 3.4   PROTTOTAL  --   --   --   --   --  6.7* 7.1   BILITOTAL  --   --   --   --   --  0.7 0.4   ALKPHOS  --   --   --   --   --  36* 48   ALT  --   --   --   --   --  54 43   AST  --   --   --   --   --  141* 64*   LIPASE  --   --   --   --   --   --  157   TROPI  --  0.029 0.032 0.027  --   --  <0.015     No results found for this or any previous visit (from the past 24 hour(s)).  Medications     amiodarone Stopped (03/06/20 0903)     dextrose 5% and 0.45% NaCl 100 mL/hr at 03/06/20 0827     - MEDICATION INSTRUCTIONS -       - MEDICATION INSTRUCTIONS -         bisacodyl  10 mg Rectal BID     furosemide  20 mg Intravenous Q8H     lactated ringers  500 mL Intravenous Once     levalbuterol  0.3 mg Nebulization Q4H     piperacillin-tazobactam  3.375 g Intravenous Q6H     QUEtiapine  25 mg Oral BID

## 2020-03-06 NOTE — PROGRESS NOTES
CM    I: (Late entry) SW received a call from ICU late yesterday afternoon, asking for callback to Palliative to discuss a possible family conference. SW call pager number; spoke w/a physician on call who asked that SW return call tomorrow to palliative day team. DtrGen, confirmed with SW she has not asked for a family conference, as she and her brothers met with Palliative on 3/4. Dtr also reported she believes pt's HCD may be on file with [place]. SW checked Epic, no documents have been scanned/nothing on file.  SW paged palliative this morning. Awaiting a call back.    P: Continue to assist as needed.    HECTOR Dickson   [facility]  [phone]    UPDATE@9:48: DONATO spoke with Amarilis Santiago, who stated she would relay information above to Dr. Santiago; will let SW know if any further intervention/assistance is needed.

## 2020-03-06 NOTE — PLAN OF CARE
[id number] pt and family were ready to remove bipap. palliative was on the unit and put in official comfort care orders. Pt appeared comfortable after dilaudid and ativan were given prior to bipap removal. After bipap was removed family requested NC be placed. An additional 1mg of ativan was given after BiPAP removal for air hunger. Adult children are at bedside and in agreement with plan.

## 2020-03-06 NOTE — PROGRESS NOTES
Patient frequently desaturating into the mid 80s on HFNC, 100%, 60 L. Nasopharyngeally suctioned with moderate amount of sputum out. Patient baseline O2 saturation overnight has been low 90s. Patient much more somnolent, open eyes to voice, but does not respond. Continuing to desaturate. BiPap mask placed on patient at 100% FiO2. O2 saturation to the mid 90s. Labored breathing. MD made aware of changes in patient. Family also called. Daughter Jeanne coming to bedside. Continuing to monitor.    Betsy Ryder RN

## 2020-03-06 NOTE — PROGRESS NOTES
SPIRITUAL HEALTH SERVICES Progress Note  [facility] [place] [facility]    Initiated visit due to family request for candle.  Family was not available upon SH arrival, but SH left electric tea light candle in room.   remains available for f/u as needed.      Familia Mclean  Chaplain Resident

## 2020-03-06 NOTE — PROGRESS NOTES
SPIRITUAL HEALTH SERVICES Progress Note  [facility] -01    The  suggested a visit might be welcome for the pt's family and friends today, as things were difficult.  Pt's friends stated the pt is not western in his approach to spirituality and is more philosophical.  They are aware spiritual health is available to them for conversation or spiritual practices at their request.        Coty Henry   Intern

## 2020-03-06 NOTE — PROGRESS NOTES
Surgery    Events reviewed. Respiratory status declining and now on Bipap. NGT with minimal output overnight.     Abd- Soft. Mild distention. No significant tenderness but patient lethargic    A/P  Seems stable from SBO standpoint but having significant respritory decline.  NGT output decreasing and no tenderness. OK to give me meds through NGT if needed. Continue ICU care.    Tray Mckenzie M.D.  [facility]  [phone]

## 2020-03-07 NOTE — DEATH PRONOUNCEMENT
MD DEATH PRONOUNCEMENT    Called to pronounce Bhavesh Schafer dead.    Physical Exam: Unresponsive to noxious stimuli, Spontaneous respirations absent, Breath sounds absent, Carotid pulse absent and Heart sounds absent    Patient was pronounced dead at .    Preliminary Cause of Death: sepsis    Active Problems:    Severe sepsis (H)     Infectious disease present?: NO    Communicable disease present? (examples: HIV, chicken pox, TB, Ebola, CJD) :  NO    Multi-drug resistant organism present? (example: MRSA): NO    Please consider an autopsy if any of the following exist:  NO Unexpected or unexplained death during or following any dental, medical, or surgical diagnostic treatment procedures.   NO Death of mother at or up to seven days after delivery.     NO All  and pediatric deaths.     NO Death where the cause is sufficiently obscure to delay completion of the death certificate.   NO Deaths in which autopsy would confirm a suspected illness/condition that would affect surviving family members or recipients of transplanted organs.     The following deaths must be reported to the 's Office:  NO A death that may be due entirely or in part to any factors other than natural disease (recent surgery, recent trauma, suspected abuse/neglect).   NO A death that may be an accident, suicide, or homicide.     NO Any sudden, unexpected death in which there is no prior history of significant heart disease or any other condition associated with sudden death.   NO A death under suspicious, unusual, or unexpected circumstances.    NO Any death which is apparently due to natural causes but in which the  does not have a personal physician familiar with the patient s medical history, social, or environmental situation or the circumstances of the terminal event.   NO Any death apparently due to Sudden Infant Death Syndrome.     NO Deaths that occur during, in association with, or as consequences  of a diagnostic, therapeutic, or anesthetic procedure.   NO Any death in which a fracture of a major bone has occurred within the past (6) six months.   NO A death of persons note seen by their physician within 120 days of demise.     NO Any death in which the  was an inmate of a public institution or was in the custody of Law Enforcement personnel.   NO  All unexpected deaths of children   NO Solid organ donors   NO Unidentified bodies   NO Deaths of persons whose bodies are to be cremated or otherwise disposed of so that the bodies will later be unavailable for examination;   NO Deaths unattended by a physician outside of a licensed healthcare facility or licensed residential hospice program   NO Deaths occurring within 24 hours of arrival to a health care facility if death is unexpected.    NO Deaths associated with the decedent s employment.   NO Deaths attributed to acts of terrorism.   NO Any death in which there is uncertainty as to whether it is a medical examiner s care should be discussed with the medical investigator.        [facility]    EKATERINA Kaplan, CNP  Hospitalist - [facility]  Text Page  ([phone])

## 2020-03-07 NOTE — PROGRESS NOTES
CPAP/BiPAP Settings  IPAP: 16  EPAP:8  Rate: 12  FiO2: 80  Timed Inspiration (sec): 0.9    CPAP/BiPAP/AVAPS/AVAPS AE Alarms  High Pressure: 20  Low Pressure: 5  Low Pressure Delay: 20  High Rate (breaths/min): 50  Low Rate (breaths/min): 5  Alarm Volume Level: 10    CPAP/BiPAP/AVAPS/AVAPS AE Patient Assessment  Skin Assessment: intact  Barriers Applied: Total face mask  Lung Sounds: Diminished LS    Plan:Continue to monitor while on Bipap.  3/6/2020  Leesa Hunt, RT

## 2020-03-07 NOTE — PROGRESS NOTES
MD Notification    Notified Person: MD    Notified Person Name:Dr. Hutchinson    Notification Date/Time:2020 @     Notification Interaction:Page    Purpose of Notification:Pt  @     Orders Received:    Comments:

## 2020-03-08 LAB
BACTERIA SPEC CULT: NO GROWTH
BACTERIA SPEC CULT: NO GROWTH
Lab: NORMAL
SPECIMEN SOURCE: NORMAL
SPECIMEN SOURCE: NORMAL

## 2020-03-11 DIAGNOSIS — I10 ESSENTIAL HYPERTENSION WITH GOAL BLOOD PRESSURE LESS THAN 140/90: ICD-10-CM

## 2020-03-11 RX ORDER — LISINOPRIL AND HYDROCHLOROTHIAZIDE 20; 25 MG/1; MG/1
TABLET ORAL
Qty: 90 TABLET | Refills: 2 | OUTPATIENT
Start: 2020-03-11

## 2020-03-15 NOTE — DISCHARGE SUMMARY
"[facility]    Death Summary - Hospitalist Service     Date of Admission:  3/2/2020  Date of Death: 3/6/2020 11:22 PM  Discharging Provider: Donaldo Dao MD    Hospital Course    Bhavesh Schafer was a 88 year old male with PMH of HTN, dyslipidemia, BPH, hx resection for incarcerated bowel in left inguinal hernia in , who was admitted on 3/2/2020 with severe shock, small bowel obstruction, and aspiration pneumonia.       Septic versus hypovolemic shock, resolved  Acute hypoxic respiratory failure  Aspiration pneumonia  Precipitous decline for 24 hours prior to admission with symptoms of vomiting, weakness cough with thick sputum. Influenza A/B PCR negative. Was in septic shock initially with hypotension, leukopenia, hypoxic respiratory failure, elevated lactate 7.2. CT chest showed SBO as well as \"patchy ground glass opacities throughout both lungs\" so no definite area of focal aspiration. Per ICU and ID, low clinical suspicion for COVID-19 status of living alone and presenting with a bowel obstruction. Most likely source of infection is aspiartion. Lactate improved to 2.7 on 3/3/2020 and 1.8 on 3/4. Having difficulty clearing secretions on 3/4/2020.  On  the patient was doing better on high flow nasal cannula but overnight his respiratory status declined and he was on BiPAP.  After discussion with the palliative care service, the family elected to transition to comfort care and remove BiLevel positive airway pressure.  He  on 3/6/20.     Small bowel obstruction  Presented with nausea and vomiting, CT imaging shows mid small bowel transition point near post operative changes in left lower quadrant anteriorly. Surgical history of segmental resection for incarcerated bowel in left inguinal hernia in  in [facility] system. Not known when last colonoscopy occurred. He was treated with an NG tube and NPO     Atrial fibrillation with rapid ventricular rate    He was loaded with " amiodarone and converted to sinus rhythm      Acute metabolic encephalopathy  Secondary to SBO and aspiration pneumonia above     Acute kidney injury   Benign prostatic hypertrophy         Creatinine   Date Value Ref Range Status   03/06/2020 1.56 (H) 0.66 - 1.25 mg/dL Final   Baseline creatinine 0.97, Cr 3.3 on admission. Likely prerenal versus ATN in setting of hypovolemia, sepsis. Improved to 2.38 on 3/4/2020.     Hypertension  Lisinopril-hydrochlorothiazide held     Cause of death:   Sepsis due to pneumonia     Donaldo Dao MD  [facility]  ______________________________________________________________________      Significant Results and Procedures   Most Recent 3 CBC's:  Recent Labs   Lab Test 03/06/20  0543 03/05/20  0421 03/04/20  0506   WBC 11.9* 9.0 8.1   HGB 12.2* 12.3* 11.3*   MCV 92 90 90   PLT 97* 119* 133*     Most Recent 3 BMP's:  Recent Labs   Lab Test 03/06/20  0543 03/05/20  0421 03/04/20  2102    138 139   POTASSIUM 3.4 3.3* 3.3*   CHLORIDE 109 108 108   CO2 27 24 25   BUN 57* 67* 71*   CR 1.56* 1.93* 2.06*   ANIONGAP 3 6 6   GRAY 8.5 8.4* 8.3*   * 126* 127*     Most Recent 2 LFT's:  Recent Labs   Lab Test 03/03/20  0537 03/02/20  1857   * 64*   ALT 54 43   ALKPHOS 36* 48   BILITOTAL 0.7 0.4   ,   Results for orders placed or performed during the hospital encounter of 03/02/20   CT Chest Abdomen Pelvis w/o Contrast    Narrative    CT CHEST ABDOMEN PELVIS WITHOUT CONTRAST   3/2/2020 8:17 PM     HISTORY: Vomiting. Hypoxia.    TECHNIQUE: Volumetric helical sections were acquired from the thoracic  inlet to the ischial tuberosities without IV contrast. Coronal images  were also reconstructed. Radiation dose for this scan was reduced  using automated exposure control, adjustment of the mA and/or kV  according to patient size, or iterative reconstruction technique.    COMPARISON: CT of the abdomen and pelvis performed 6/20/2019.    FINDINGS:    Chest: Patchy  groundglass and airspace opacities throughout both  lungs, greater on the right, are nonspecific, and could be related to  edema or infection. No pleural or pericardial effusions. No enlarged  lymph nodes are identified in the chest. Atherosclerotic calcification  of the thoracic aorta and coronary arteries. Aneurysmal dilatation of  the ascending thoracic aorta measures 4.8 cm in diameter. The thoracic  aorta is tortuous.    Abdomen and Pelvis: Multiple hepatic cysts are again noted, with the  largest in the caudate lobe measuring 4.1 cm. Gallbladder is not seen,  and may be contracted or surgically absent. The spleen, adrenal  glands, and pancreas have unremarkable noncontrast appearances. Small  cysts are noted in the upper poles of both kidneys, with the largest  on the left measuring 4 cm. No hydronephrosis. Moderate  atherosclerotic aortoiliac calcification.    There are multiple prominently dilated fluid-filled loops of mid small  bowel in the lower abdomen anteriorly, with a probable transition  point near postoperative changes in the left lower quadrant (series 7  image 34). Findings are consistent with a mid small-bowel obstruction.  Several decompressed loops of distal small bowel are noted in the  right lower quadrant. The stomach is moderately distended with gas and  fluid. No convincing evidence for colitis or diverticulitis. The colon  is largely decompressed. No free fluid in the pelvis. No free  intraperitoneal air.    Moderate to marked compression of the T12 vertebral body has increased  since the previous exam, and is age-indeterminate. Multiple milder  vertebral body compressions are also noted in the thoracolumbar spine,  not significantly changed where visualized previously. Degenerative  changes are noted throughout the thoracolumbar spine.      Impression    IMPRESSION:   1. There is a mid small-bowel obstruction, with a transition point  near postoperative changes in the left lower quadrant  anteriorly.  2. Patchy groundglass and airspace opacities throughout both lungs are  nonspecific, and could be related to edema or infection.  3. Aneurysmal dilatation of the ascending thoracic aorta measures 4.8  cm in diameter.  4. Moderate to marked compression of the T12 vertebral body has  increased since the previous exam, and is age-indeterminate.          JORDIN DRAKE MD   XR Chest Port 1 View    Narrative    EXAM: XR CHEST PORT 1 VW  LOCATION: [facility]  DATE/TIME: 3/3/2020 5:30 AM    INDICATION: Abnormal CT.  COMPARISON: CT chest 03/02/2020.      Impression    IMPRESSION: Borderline heart size. Thoracic aorta is atherosclerotic and aneurysmal dilatation of the aortic knob. Small amount of bibasilar interstitial densities which may represent atelectasis or inflammatory process. No pneumothorax.   Demineralization.   XR Abdomen Port 1 View    Narrative    ABDOMEN ONE VIEW PORTABLE  3/3/2020 12:20 PM     HISTORY: rt. nare nasogastric tube placed at 70 cm    COMPARISON: 3/23'20 CT       Impression    IMPRESSION: An NG tube terminates appropriately with the sidehole in  the stomach. Basilar opacities are again seen and likely represent  scarring and airspace disease. No distended air-filled loops of small  bowel identified.    LESTER J FAHRNER, MD   XR Chest Port 1 View    Narrative    CHEST ONE VIEW  3/4/2020 7:30 AM     HISTORY: Increase oxygen demand.    COMPARISON: March 3, 2020      Impression    IMPRESSION: Increased patchy infiltrates in the mid right lung  compared to previous. Increased left lower lobe atelectasis and/or  infiltrate. Similar right lower lobe atelectasis and/or infiltrate.  Orogastric tube tip below the margin of the study.    NEETU TABARES MD   XR Abdomen Port 1 View    Narrative    ABDOMEN ONE VIEW PORTABLE  3/5/2020 10:30 AM     HISTORY: Check for progression of PO contrast    COMPARISON: CT dated 3/2/2020. Radiograph dated 3/3/2020.       Impression    IMPRESSION:  Oral contrast material is present. It is unclear if this  is an the small bowel or colon. Recommend abdominal radiography in the  radiology department.    LESTER J FAHRNER, MD       Consultations This Hospital Stay   INFECTIOUS DISEASES IP CONSULT  SURGERY GENERAL IP CONSULT  INTENSIVIST IP CONSULT  PALLIATIVE CARE ADULT IP CONSULT  PHYSICAL THERAPY ADULT IP CONSULT  OCCUPATIONAL THERAPY ADULT IP CONSULT  SWALLOW EVAL SPEECH PATH AT BEDSIDE IP CONSULT  SOCIAL WORK IP CONSULT    Primary Care Physician   Eugene Maki    Time Spent on this Encounter   I, Donaldo Dao MD, personally saw the patient today and spent less than or equal to 30 minutes discharging this patient.

## 2020-03-16 LAB
SPECIMEN SOURCE: NORMAL
VIRUS SPEC CULT: NORMAL
VIRUS SPEC CULT: NORMAL

## 2021-10-18 NOTE — PROGRESS NOTES
Pt. Given fluids as per order for Mean less than 65.  Able to wean Bipap down to 50 percent and family wanted pt to to try hi--flow as pt anxious regarding bipap.  Attempted hi flow at 100 percent and 45 liter flow and unable to maintain sat and was 87-88.  Pt. Placed back on Bipap.  Lung sounds coarse and coughing up yellow secretions.  Urine output remains at least 30 per hour. Pt oriented to self and much of speech not understandable. Son states he speaks two languages and is occ. Speaking in Sinhala.   normal...

## 2022-02-10 NOTE — PLAN OF CARE
[id number]: Pt is on comfort care. Pt appeared comfortable. Family (adult children are at bedside)   will ask for cares and reposition when they are ready. The family requested NC be placed after initial removal of BiPap.  Pt  at . All belongings were taken home by one of his sons.   +bilateral pedal pulse, capillary refill <3 seconds.

## 2022-12-24 NOTE — PLAN OF CARE
SLP: Order received for clinical swallow evaluation. Per chart review and discussion with RN patient not yet appropriate for swallow evaluation given current O2 requirements (HFNC 50L 100% fiO2) and elevated respiratory rate (40's). Would also like to clarify with surgery team if patient ready for swallow study from GI standpoint given SBO. Will reschedule for 3-6-2020.   arm

## 2023-01-10 NOTE — TELEPHONE ENCOUNTER
Addressed, gave to Bernarda PETIT  
Faxed to [phone]& copy sent to scan.    Bernarda ARMENTA  
Received form(s) from home health certification and plan of care for [place].  Placed form(s) in/on JF's desk.  Forms need to be signed and faxed to number listed on form..    Call pt to verify form was sent: No  Copy needs to be sent for scanning after completion: Yes        
done

## 2023-09-27 NOTE — PROGRESS NOTES
Surgery Consultation, [facility], DENI Garcia MD    Bhavesh Schafer MRN# [id number]   YOB: 1931 Age: 88 year old     PCP:  Eugene Maki [phone]    Chief Complaint: Left inguinal hernia    Pt was seen in consultation from Eugene Maki.    History of Present Illness:  Bhavesh Schafer is a 88 year old male who presented with a clinical history consistent with left inguinal hernia.  Patient was noted to have a palpable left groin lump for some time.  He feels it has been present for many months but was not symptomatic.  Some days ago he was able to completely reduce the bulge and has not returned, which is unusual.  Since that time, he has felt some discomfort in the central pelvis.  He has been eating okay but has had some nausea.  Normal bowel function.  He has a history of previous laparoscopic right inguinal hernia repair performed in 2013.  He is here to discuss his symptoms.    PMH:  Bhavesh Schafer  has a past medical history of Essential hypertension, benign, Inguinal hernia without mention of obstruction or gangrene, unilateral or unspecified, (not specified as recurrent) (6/2013), Macular degeneration (senile) of retina, unspecified, Pure hypercholesterolemia, and Retinal neovascularization NOS.  PSH:  Bhavesh Schafer  has a past surgical history that includes TOOTH EXTRACTION W/FORCEP; REPAIR ING HERNIA,5+Y/O,REDUCIBL; REMOVE TONSILS/ADENOIDS,<13 Y/O (age 8); REPAIR ING HERNIA,5+Y/O,BALTAZAR (8/04); laser surgery of eye (8/04); and Laparoscopic herniorrhaphy inguinal (7/12/2013).    Home medications and allergies reviewed.    Social History:  Bhavesh Schafer  reports that he quit smoking about 40 years ago. He has never used smokeless tobacco. He reports that he does not drink alcohol or use drugs.  Family History:  Bhavesh Schafer family history includes Cancer in his mother; Cerebrovascular Disease in his father; Hypertension in his  "father.    ROS:  The 10 point Review of Systems is negative other than noted in the HPI.  No fevers or chills.  Nausea as described.    Physical Exam:  Blood pressure 124/78, pulse 98, resp. rate 16, height 1.778 m (5' 10\"), weight 62.1 kg (137 lb), SpO2 95 %.  137 lbs 0 oz  Pleasant elderly gentleman in no distress.  Patient has a pleasant affect and communicates well.   Pupils equal round and reactive to light.   No cervical lymphadenopathy or thyromegaly.   Lung fields clear, breathing comfortably.   Heart normal sinus rhythm.  No murmurs rubs or gallops.  Abdomen soft, nontender, nondistended.  No evidence for right inguinal hernia.  Some mild tenderness in the left groin but unable to appreciate an obvious bulge or hernia.  Skin warm, dry.  No obvious rashes or lesions.    All new lab and imaging data was reviewed.  This includes outside clinic notes and operative reports.     Assessment/plan: Yogesh 88-year-old gentleman with a history of previous right inguinal hernia now presents with left inguinal hernia.  This has not been symptomatic for the patient but has recently caused him some anxiety.  After reducing his left inguinal hernia, patient feels increased discomfort and has had intermittent nausea.  I am not exactly sure what the etiology of his new symptoms are but have ordered a CT scan.  It is conceivable that he may have some bowel entrapment within the lower peritoneum causing his symptoms.  In general, I have not recommended elective hernia repair, given his previous lack of symptoms.  I will follow-up once the CT scan is been reviewed.  Surgical co-morbities include hypertension, hyperlipidemia, urinary retention, iron deficiency anemia.    Jan Garcia M.D.  [facility]  [phone]    Please route or send letter to:  Primary Care Provider (PCP) and Referring Provider  " Electrodesiccation And Curettage Text: The wound bed was treated with electrodesiccation and curettage after the biopsy was performed.

## (undated) DEVICE — CLIP APPLIER ENDO 5MM M/L LIGAMAX EL5ML

## (undated) DEVICE — LINEN TOWEL PACK X5 5464

## (undated) DEVICE — PACK LAP CHOLE SLC15LCFSD

## (undated) DEVICE — PREP CHLORAPREP 26ML TINTED ORANGE  260815

## (undated) DEVICE — SU PDS II 0 ENDOLOOP EZ10G

## (undated) DEVICE — ESU HOLDER LAP INST DISP PURPLE LONG 330MM H-PRO-330

## (undated) DEVICE — SOL NACL 0.9% IRRIG 1000ML BOTTLE 2F7124

## (undated) DEVICE — GLOVE PROTEXIS BLUE W/NEU-THERA 7.5  2D73EB75

## (undated) DEVICE — SU VICRYL 0 UR-6 27" J603H

## (undated) DEVICE — DISSECTOR BALLOON SPACEMAKER PRO BTT & OVAL SMBTTOVLX

## (undated) DEVICE — ENDO SCOPE WARMER LF TM500

## (undated) DEVICE — SU MONOCRYL 4-0 PS-2 18" UND Y496G

## (undated) DEVICE — BLADE CLIPPER 4406

## (undated) DEVICE — GLOVE PROTEXIS W/NEU-THERA 7.5  2D73TE75

## (undated) DEVICE — SOL WATER IRRIG 1000ML BOTTLE 2F7114

## (undated) RX ORDER — ACETAMINOPHEN 325 MG/1
TABLET ORAL
Status: DISPENSED
Start: 2019-01-01

## (undated) RX ORDER — CEFAZOLIN SODIUM 2 G/100ML
INJECTION, SOLUTION INTRAVENOUS
Status: DISPENSED
Start: 2019-01-01

## (undated) RX ORDER — GLYCOPYRROLATE 0.2 MG/ML
INJECTION, SOLUTION INTRAMUSCULAR; INTRAVENOUS
Status: DISPENSED
Start: 2019-01-01

## (undated) RX ORDER — FENTANYL CITRATE 0.05 MG/ML
INJECTION, SOLUTION INTRAMUSCULAR; INTRAVENOUS
Status: DISPENSED
Start: 2019-01-01

## (undated) RX ORDER — FENTANYL CITRATE 50 UG/ML
INJECTION, SOLUTION INTRAMUSCULAR; INTRAVENOUS
Status: DISPENSED
Start: 2019-01-01